# Patient Record
Sex: FEMALE | Race: ASIAN | Employment: FULL TIME | URBAN - METROPOLITAN AREA
[De-identification: names, ages, dates, MRNs, and addresses within clinical notes are randomized per-mention and may not be internally consistent; named-entity substitution may affect disease eponyms.]

---

## 2017-11-06 ENCOUNTER — MED REC SCAN ONLY (OUTPATIENT)
Dept: FAMILY MEDICINE CLINIC | Facility: CLINIC | Age: 47
End: 2017-11-06

## 2018-04-23 ENCOUNTER — HOSPITAL ENCOUNTER (OUTPATIENT)
Age: 48
Discharge: HOME | End: 2018-04-23
Attending: FAMILY MEDICINE
Payer: COMMERCIAL

## 2018-04-23 VITALS
BODY MASS INDEX: 23 KG/M2 | SYSTOLIC BLOOD PRESSURE: 149 MMHG | RESPIRATION RATE: 16 BRPM | OXYGEN SATURATION: 99 % | DIASTOLIC BLOOD PRESSURE: 95 MMHG | HEART RATE: 61 BPM | WEIGHT: 125 LBS | HEIGHT: 62 IN | TEMPERATURE: 99 F

## 2018-04-23 DIAGNOSIS — J02.0 STREPTOCOCCAL SORE THROAT: Primary | ICD-10-CM

## 2018-04-23 PROCEDURE — 99213 OFFICE O/P EST LOW 20 MIN: CPT

## 2018-04-23 PROCEDURE — 99204 OFFICE O/P NEW MOD 45 MIN: CPT

## 2018-04-23 PROCEDURE — 87430 STREP A AG IA: CPT | Performed by: FAMILY MEDICINE

## 2018-04-23 RX ORDER — ENALAPRIL MALEATE 5 MG/1
10 TABLET ORAL DAILY
COMMUNITY
End: 2020-02-02 | Stop reason: ALTCHOICE

## 2018-04-23 RX ORDER — CEPHALEXIN 500 MG/1
500 CAPSULE ORAL 3 TIMES DAILY
Qty: 30 CAPSULE | Refills: 0 | Status: SHIPPED | OUTPATIENT
Start: 2018-04-23 | End: 2018-05-03

## 2018-04-24 NOTE — ED PROVIDER NOTES
Patient Seen in: [facility]    History   Patient presents with:  Sore Throat    Stated Complaint: SORE THROAT X 2-3 DAYS    HPI  50-year-old female presents to the immediate care today with chief complaints of a sore age and cooperative  Head: Normocephalic, without obvious abnormality, atraumatic  Eyes: conjunctivae/corneas clear. PERRL, EOM's intact. Fundi benign. Ears: normal TM's and external ear canals both ears  Nose: Nares normal. Septum midline.  Mucosa normal.

## 2018-05-07 ENCOUNTER — TELEPHONE (OUTPATIENT)
Dept: INTERNAL MEDICINE CLINIC | Facility: CLINIC | Age: 48
End: 2018-05-07

## 2018-05-08 NOTE — TELEPHONE ENCOUNTER
Removed you off care team, this should drop pt off your list.  Please let me know if this doesn't after 24 hrs.

## 2018-12-05 ENCOUNTER — MED REC SCAN ONLY (OUTPATIENT)
Dept: FAMILY MEDICINE CLINIC | Facility: CLINIC | Age: 48
End: 2018-12-05

## 2019-05-14 ENCOUNTER — HOSPITAL ENCOUNTER (OUTPATIENT)
Age: 49
Discharge: HOME | End: 2019-05-14
Attending: FAMILY MEDICINE
Payer: COMMERCIAL

## 2019-05-14 VITALS
WEIGHT: 125 LBS | BODY MASS INDEX: 23 KG/M2 | OXYGEN SATURATION: 100 % | HEART RATE: 68 BPM | TEMPERATURE: 98 F | SYSTOLIC BLOOD PRESSURE: 143 MMHG | RESPIRATION RATE: 16 BRPM | DIASTOLIC BLOOD PRESSURE: 85 MMHG

## 2019-05-14 DIAGNOSIS — J02.9 ACUTE VIRAL PHARYNGITIS: Primary | ICD-10-CM

## 2019-05-14 PROCEDURE — 99214 OFFICE O/P EST MOD 30 MIN: CPT

## 2019-05-14 PROCEDURE — 87081 CULTURE SCREEN ONLY: CPT | Performed by: FAMILY MEDICINE

## 2019-05-14 PROCEDURE — 87430 STREP A AG IA: CPT | Performed by: FAMILY MEDICINE

## 2019-05-14 RX ORDER — PREDNISONE 20 MG/1
40 TABLET ORAL DAILY
Qty: 10 TABLET | Refills: 0 | Status: SHIPPED | OUTPATIENT
Start: 2019-05-14 | End: 2019-05-19

## 2019-05-14 NOTE — ED PROVIDER NOTES
Patient Seen in: [facility]    History   Patient presents with:  Sore Throat    Stated Complaint: Sore throat x 2 days     HPI    67-year-old female history of ADHD, allergic rhinitis, and osteoporosis presents to the IC se Mouth: MMM. Posterior pharynx is clear. No erythema. No exudate. Uvula is midline. Neck: Supple, NT, FROM. No meningeal signs. LAD: No lymphadenopathy. Heart: S1,S2 RRR, No murmur  Lungs: CTA bilateral. No wheezes rales or rhonchi.   Skin: Warm and dry

## 2020-02-02 ENCOUNTER — HOSPITAL ENCOUNTER (OUTPATIENT)
Age: 50
Discharge: HOME | End: 2020-02-02
Attending: EMERGENCY MEDICINE
Payer: COMMERCIAL

## 2020-02-02 VITALS
OXYGEN SATURATION: 99 % | DIASTOLIC BLOOD PRESSURE: 89 MMHG | HEART RATE: 69 BPM | TEMPERATURE: 98 F | SYSTOLIC BLOOD PRESSURE: 146 MMHG | RESPIRATION RATE: 18 BRPM

## 2020-02-02 DIAGNOSIS — T78.40XA ALLERGIC REACTION, INITIAL ENCOUNTER: Primary | ICD-10-CM

## 2020-02-02 PROCEDURE — 99214 OFFICE O/P EST MOD 30 MIN: CPT

## 2020-02-02 PROCEDURE — 96374 THER/PROPH/DIAG INJ IV PUSH: CPT

## 2020-02-02 RX ORDER — PREDNISONE 20 MG/1
20 TABLET ORAL DAILY
Qty: 4 TABLET | Refills: 0 | Status: SHIPPED | OUTPATIENT
Start: 2020-02-03 | End: 2020-02-07

## 2020-02-02 RX ORDER — PREDNISONE 20 MG/1
40 TABLET ORAL ONCE
Status: COMPLETED | OUTPATIENT
Start: 2020-02-02 | End: 2020-02-02

## 2020-02-02 RX ORDER — DIPHENHYDRAMINE HYDROCHLORIDE 50 MG/ML
25 INJECTION INTRAMUSCULAR; INTRAVENOUS ONCE
Status: COMPLETED | OUTPATIENT
Start: 2020-02-02 | End: 2020-02-02

## 2020-02-02 RX ORDER — EPINEPHRINE 0.3 MG/.3ML
0.3 INJECTION SUBCUTANEOUS
Qty: 1 EACH | Refills: 0 | Status: SHIPPED | OUTPATIENT
Start: 2020-02-02 | End: 2020-03-03

## 2020-02-02 NOTE — ED NOTES
Pt also states that the sweating she was feeling when she first came in is now getting better. Pt states that her throat feels the same. Pt's  is at the bedside at this time.

## 2020-02-02 NOTE — ED PROVIDER NOTES
Patient Seen in: [facility]      History   Patient presents with:   Allergic Rxn Allergies    Stated Complaint: allergic reaction, swelling left eye and sore throat today    HPI    Patient states that she took ibuprofen for O2 Device None (Room air)       Current:/89   Pulse 69   Temp 98.3 °F (36.8 °C) (Temporal)   Resp 18   SpO2 99%         Physical Exam  Vitals signs and nursing note reviewed. Constitutional:       General: She is not in acute distress.      Appearan Medications Prescribed:  Current Discharge Medication List    START taking these medications    EPINEPHrine 0.3 MG/0.3ML Injection Solution Auto-injector  Inject 0.3 mL (1 each total) into the muscle daily as needed.   Qty: 1 each Refills: 0    predniSONE 2

## 2020-02-02 NOTE — ED INITIAL ASSESSMENT (HPI)
Pt states that she took Ibuprofen for a headache today at 1230pm and is now having swelling to her left eye and throat swelling/discomfort.  Pt states that she started feeling some weird symptoms around 2pm. Pt just had a reaction from Aleve last week and s

## 2020-11-02 ENCOUNTER — OFFICE VISIT (OUTPATIENT)
Dept: ORTHOPEDICS CLINIC | Facility: CLINIC | Age: 50
End: 2020-11-02
Payer: COMMERCIAL

## 2020-11-02 DIAGNOSIS — M25.511 RIGHT SHOULDER PAIN, UNSPECIFIED CHRONICITY: Primary | ICD-10-CM

## 2020-11-02 DIAGNOSIS — M75.01 ADHESIVE CAPSULITIS OF RIGHT SHOULDER: ICD-10-CM

## 2020-11-02 PROCEDURE — 99213 OFFICE O/P EST LOW 20 MIN: CPT | Performed by: ORTHOPAEDIC SURGERY

## 2020-11-02 PROCEDURE — 99072 ADDL SUPL MATRL&STAF TM PHE: CPT | Performed by: ORTHOPAEDIC SURGERY

## 2020-11-02 RX ORDER — CETIRIZINE HYDROCHLORIDE 10 MG/1
10 TABLET ORAL NIGHTLY
COMMUNITY
End: 2021-03-17 | Stop reason: ALTCHOICE

## 2020-11-02 RX ORDER — TRAZODONE HYDROCHLORIDE 50 MG/1
TABLET ORAL
COMMUNITY
Start: 2019-12-06 | End: 2021-03-17 | Stop reason: ALTCHOICE

## 2020-11-02 RX ORDER — ENALAPRIL MALEATE 10 MG/1
10 TABLET ORAL DAILY
COMMUNITY

## 2020-11-02 NOTE — PROGRESS NOTES
[facility] Follow-up Progress Note      Chief Complaint: Right shoulder pain and stiffness      History: The patient is a 41-year-old [facility] female who returns due to similar but less severe symptoms about her right shoulder.   She was diagnosed needed. Austin Hilario MD  [address] [place] [address] Surgery    The dictation was partially prepared using [employer] voice recognition software.   Although every attempt is made to correct errors where identified, discrepancies may still exist.

## 2021-01-11 ENCOUNTER — OFFICE VISIT (OUTPATIENT)
Dept: ORTHOPEDICS CLINIC | Facility: CLINIC | Age: 51
End: 2021-01-11
Payer: COMMERCIAL

## 2021-01-11 DIAGNOSIS — M75.01 ADHESIVE CAPSULITIS OF RIGHT SHOULDER: Primary | ICD-10-CM

## 2021-01-11 PROCEDURE — 99213 OFFICE O/P EST LOW 20 MIN: CPT | Performed by: ORTHOPAEDIC SURGERY

## 2021-01-11 PROCEDURE — 20610 DRAIN/INJ JOINT/BURSA W/O US: CPT | Performed by: ORTHOPAEDIC SURGERY

## 2021-01-11 RX ORDER — TRIAMCINOLONE ACETONIDE 40 MG/ML
40 INJECTION, SUSPENSION INTRA-ARTICULAR; INTRAMUSCULAR ONCE
Status: COMPLETED | OUTPATIENT
Start: 2021-01-11 | End: 2021-01-11

## 2021-01-11 RX ADMIN — TRIAMCINOLONE ACETONIDE 40 MG: 40 INJECTION, SUSPENSION INTRA-ARTICULAR; INTRAMUSCULAR at 16:29:00

## 2021-01-11 NOTE — PROGRESS NOTES
[facility] Follow-up Progress Note      Chief Complaint: Right shoulder pain and stiffness      History: Patient is a 21-year-old female who presents for reassessment of her right shoulder.   Although she has been working on mobilization and str Although every attempt is made to correct errors where identified, discrepancies may still exist.

## 2021-08-02 ENCOUNTER — OFFICE VISIT (OUTPATIENT)
Dept: ORTHOPEDICS CLINIC | Facility: CLINIC | Age: 51
End: 2021-08-02
Payer: COMMERCIAL

## 2021-08-02 VITALS — WEIGHT: 130 LBS | BODY MASS INDEX: 23.92 KG/M2 | HEIGHT: 62 IN

## 2021-08-02 DIAGNOSIS — M75.01 ADHESIVE CAPSULITIS OF RIGHT SHOULDER: Primary | ICD-10-CM

## 2021-08-02 PROCEDURE — 99213 OFFICE O/P EST LOW 20 MIN: CPT | Performed by: ORTHOPAEDIC SURGERY

## 2021-08-02 PROCEDURE — 3008F BODY MASS INDEX DOCD: CPT | Performed by: ORTHOPAEDIC SURGERY

## 2021-08-02 NOTE — PROGRESS NOTES
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   She has been seen and evalu continues to struggle over time. All questions were answered and she verbalized understanding and appreciation. Cherie Pepper MD  [address] [place] [address] Surgery    The dictation was partially prepared using 4500 [employer] voice recognition software.   Alth

## 2021-11-01 ENCOUNTER — MED REC SCAN ONLY (OUTPATIENT)
Dept: ORTHOPEDICS CLINIC | Facility: CLINIC | Age: 51
End: 2021-11-01

## 2022-02-09 PROBLEM — I10 PRIMARY HYPERTENSION: Status: ACTIVE | Noted: 2022-02-09

## 2022-02-09 PROBLEM — G47.11 IDIOPATHIC HYPERSOMNIA: Status: ACTIVE | Noted: 2022-02-09

## 2022-03-01 ENCOUNTER — MED REC SCAN ONLY (OUTPATIENT)
Dept: ORTHOPEDICS CLINIC | Facility: CLINIC | Age: 52
End: 2022-03-01

## 2022-05-16 ENCOUNTER — TELEPHONE (OUTPATIENT)
Dept: ORTHOPEDICS CLINIC | Facility: CLINIC | Age: 52
End: 2022-05-16

## 2022-05-16 NOTE — TELEPHONE ENCOUNTER
Patient is scheduled with Dr. Odalys Bernardo for right elbow pain. Please advise if imaging is needed.

## 2022-06-01 ENCOUNTER — MED REC SCAN ONLY (OUTPATIENT)
Dept: ORTHOPEDICS CLINIC | Facility: CLINIC | Age: 52
End: 2022-06-01

## 2022-06-21 ENCOUNTER — HOSPITAL ENCOUNTER (OUTPATIENT)
Dept: GENERAL RADIOLOGY | Age: 52
Discharge: HOME | End: 2022-06-21
Attending: ORTHOPAEDIC SURGERY
Payer: COMMERCIAL

## 2022-06-21 ENCOUNTER — OFFICE VISIT (OUTPATIENT)
Dept: ORTHOPEDICS CLINIC | Facility: CLINIC | Age: 52
End: 2022-06-21
Payer: COMMERCIAL

## 2022-06-21 VITALS — BODY MASS INDEX: 23.92 KG/M2 | HEIGHT: 62 IN | WEIGHT: 130 LBS

## 2022-06-21 DIAGNOSIS — M25.521 RIGHT ELBOW PAIN: ICD-10-CM

## 2022-06-21 DIAGNOSIS — M25.521 RIGHT ELBOW PAIN: Primary | ICD-10-CM

## 2022-06-21 PROCEDURE — 3008F BODY MASS INDEX DOCD: CPT | Performed by: ORTHOPAEDIC SURGERY

## 2022-06-21 PROCEDURE — 99213 OFFICE O/P EST LOW 20 MIN: CPT | Performed by: ORTHOPAEDIC SURGERY

## 2022-06-21 PROCEDURE — 73080 X-RAY EXAM OF ELBOW: CPT | Performed by: ORTHOPAEDIC SURGERY

## 2022-10-14 ENCOUNTER — MED REC SCAN ONLY (OUTPATIENT)
Dept: ORTHOPEDICS CLINIC | Facility: CLINIC | Age: 52
End: 2022-10-14

## 2024-08-27 ENCOUNTER — HOSPITAL ENCOUNTER (INPATIENT)
Facility: HOSPITAL | Age: 54
LOS: 1 days | Discharge: HOME | End: 2024-08-28
Attending: EMERGENCY MEDICINE | Admitting: HOSPITALIST

## 2024-08-27 ENCOUNTER — APPOINTMENT (OUTPATIENT)
Dept: MRI IMAGING | Facility: HOSPITAL | Age: 54
End: 2024-08-27
Attending: EMERGENCY MEDICINE

## 2024-08-27 ENCOUNTER — APPOINTMENT (OUTPATIENT)
Dept: CT IMAGING | Facility: HOSPITAL | Age: 54
End: 2024-08-27
Attending: EMERGENCY MEDICINE

## 2024-08-27 NOTE — ED INITIAL ASSESSMENT (HPI)
Pt arrives to ED to have \"speech impairment\" evaluated, which started three days ago. Pt states she has been titrating the Xywav down for the past 2-3 weeks and is now at 3.5. Pt states the higher dose Xywav did make her feeling numbness and tingling, but is not currently experiencing that. Pt just stated she stopped the Xywav medication two nights ago. Pt denies HA, visual changes, numbness, tingling. Pt takes Ketamine once a month, last dose three weeks ago.

## 2024-08-28 ENCOUNTER — TELEPHONE (OUTPATIENT)
Dept: SURGERY | Facility: CLINIC | Age: 54
End: 2024-08-28

## 2024-08-28 ENCOUNTER — APPOINTMENT (OUTPATIENT)
Dept: CT IMAGING | Facility: HOSPITAL | Age: 54
End: 2024-08-28
Attending: INTERNAL MEDICINE

## 2024-08-28 ENCOUNTER — APPOINTMENT (OUTPATIENT)
Dept: CV DIAGNOSTICS | Facility: HOSPITAL | Age: 54
End: 2024-08-28
Attending: HOSPITALIST

## 2024-08-28 DIAGNOSIS — I67.5 MOYAMOYA: Primary | ICD-10-CM

## 2024-08-28 PROBLEM — R47.9 SPEECH DISTURBANCE, UNSPECIFIED TYPE: Status: ACTIVE | Noted: 2024-08-28

## 2024-08-28 PROBLEM — I63.9 ACUTE CVA (CEREBROVASCULAR ACCIDENT) (HCC): Status: ACTIVE | Noted: 2024-08-28

## 2024-08-28 PROBLEM — R73.9 HYPERGLYCEMIA: Status: ACTIVE | Noted: 2024-08-28

## 2024-08-28 NOTE — DISCHARGE PLANNING
Patient follow-up appointment information:     Visit Type: Ischemic  Date of TIA/Stroke: 8/27  Type of Stroke: Ischemic  Patient to follow-up: 4 weeks  OP Neurologist: N/A  New patient to neurology service: yes  Anticipated discharge (if known): today  Discharge disposition (if known): home      RN Stroke Navigator team  [phone]

## 2024-08-28 NOTE — SLP NOTE
ADULT SLP EVALUATION    ASSESSMENT    ASSESSMENT/OVERALL IMPRESSION:  Patient is a 53 y/o female admitted with dysarthria and PMHx significant for hypersomnia. SLP order received to evaluate and treat per stroke protocol. Patient received alert and oriented in bed. She denied history of dysphagia symptoms and reported consuming a regular diet and thin liquids at baseline. Patient reported onset of dysarthria ~1 week ago with consistent symptoms since that time. She denied any numbness or tingling to the face, lips, or tongue. No significant facial droop noted at rest, but reduced/delayed right labial retraction and minimal right lingual deviation noted on oral-mechanism exam.    Patient presented with an intact oropharyngeal swallow. Bolus acceptance was adequate without evidence of anterior bolus loss. Mastication and AP bolus transit were thorough and efficient without evidence of oral residue. Pharyngeal swallow initiation appeared timely and hyolaryngeal excursion was adequate per palpation.  No overt s/s of aspiration observed and patient denied odynophagia and globus sensation across consistencies. Recommend patient initiate a regular diet and thin liquids.    Patient also presented with largely intact expressive and receptive language. However, mild-moderate dysarthria observed. Naming and repetition were intact. Patient able to follow multi-step commands and answer yes/no questions accurately. Patient independently compensating for motor speech deficits by reducing rate of speech and exaggerating articulatory contacts which significant improved intelligibility. SLP will continue to follow for motor speech therapy during hospitalization. She would likely benefit from outpatient SLP services at time of discharge. Education provided re: results and recommendations.          RECOMMENDATIONS   Diet Recommendations - Solids: Regular  Diet Recommendations - Liquids: Thin Liquids                               Medication Administration Recommendations: No restrictions  Treatment Plan/Recommendations: Dysarthria therapy    HISTORY   MEDICAL HISTORY  Reason for Referral: Stroke protocol    Problem List  Principal Problem:    Speech disturbance, unspecified type  Active Problems:    Primary hypertension    Hyperglycemia    Acute CVA (cerebrovascular accident) (HCC)      Past Medical History  Past Medical History:    ADHD (attention deficit hyperactivity disorder)    Allergic rhinitis    Idiopathic hypersomnia    Osteoporosis    Seasonal affective disorder (HCC)    Vitamin D deficiency          Diet Prior to Admission: Regular;Thin liquids       Patient/Family Goals: none stated    SWALLOWING HISTORY  Current Diet Consistency: NPO  Dysphagia History: as above  Imaging Results:   MRI Brain 8/27/24    CONCLUSION:  Abnormal brain MRI.  There is a 15 mm focus of restricted diffusion within the posterior left frontal lobe.  There are smaller scattered punctate foci of restriction abnormality within the left central semiovale.  These imaging findings are   suspicious for a 15 mm acute infarct within the posterior left frontal lobe with scattered punctate subacute infarcts within the parasagittal left frontal lobe.  These findings are within the watershed territory of the left middle cerebral artery.  A CTA    of the head and neck is recommended for further assessment.  A demyelinating process cannot be entirely excluded and continued clinical and imaging follow-up is recommended for further assessment.         Critical results were discussed with Dr. Rosas at midnight on 8/28/2024. Critical results were read back.         LOCATION:  [facility]            Dictated by (CST): Stromberg, LeRoy, MD on 8/27/2024 at 11:55 PM       Finalized by (CST): Stromberg, LeRoy, MD on 8/28/2024 at 0:06 AM     SUBJECTIVE       OBJECTIVE   ORAL MOTOR EXAMINATION  Dentition: Functional  Symmetry: Within Functional Limits  Strength: Reduced right  facial;Reduced right lingual     Range of Motion: Reduced right facial       Voice Quality: Clear  Respiratory Status: Unlabored  Consistencies Trialed: Thin liquids;Puree;Hard solid  Method of Presentation: Self presentation  Patient Positioning: Upright;Midline    Oral Phase of Swallow: Within Functional Limits                      Pharyngeal Phase of Swallow: Within Functional Limits           (Please note: Silent aspiration cannot be evaluated clinically. Videofluoroscopic Swallow Study is required to rule-out silent aspiration.)    Esophageal Phase of Swallow: No complaints consistent with possible esophageal involvement  Comments: d/w RN              GOALS  Goal #1 Patient will implement intelligibility strategies with 90% accuracy when provided minimal cues within 2 visits.  In Progress   Goal #2 The patient will tolerate regular consistency and thin liquids without overt signs or symptoms of aspiration with 90 % accuracy over 1-2 session(s).    In Progress   Goal #3     Goal #4     Goal #5     Goal #6     Goal #7     Goal #8       FOLLOW UP  Treatment Plan/Recommendations: Dysarthria therapy     Follow Up Needed (Documentation Required): Yes  SLP Follow-up Date: 08/29/24    Thank you for your referral.   If you have any questions, please contact MERT Santos

## 2024-08-28 NOTE — PLAN OF CARE
Assumed care at 0730  A/Ox4. KHURRAM ANDUJAR on tele   Neuro checks q4  Denies pain at this time   R AC PIV infusing 0.9 at 75ml/hr  Up ad shanti   SLP cleared for regular diet- thin liquids   Accuchecks QID per stroke protocol  Echo results pending   Plan for CTA brain/carotids today  Safety precautions in place. All needs met at this time

## 2024-08-28 NOTE — PLAN OF CARE
NURSING DISCHARGE NOTE    Discharged Home via Wheelchair.  Accompanied by Family member  Belongings Taken by patient/family.    AVS paperwork reviewed with pt   PIV removed

## 2024-08-28 NOTE — TELEPHONE ENCOUNTER
You are scheduled for Diagnostic Cerebral Angiogram on 9.3.24 with Dr. Guzman    The procedure is performed at the [facility] located at the [facility].    The hospital will call you 1-2 days prior to the scheduled procedure to review your health history and medications, and will tell you what time to arrive.     This procedure is outpatient.  It is done under conscious sedation and use of local anesthetic at the puncture site. You must have someone available to drive you home since you will be receiving sedation for procedure.     Avoid scheduling any appointments on date of procedure, you can expect to be at the hospital about 6-7 hours.     You will need to have labs completed within 2 weeks of the procedure. Please call central scheduling to make appointments to have these done at [phone]      Do not take Vitamin E,C, K, fish oil, krill oil, tumeric or any OTC herbal supplements for 10-14 days prior to procedure.      You should start taking Aspirin, Plavix today and continue taking up to and after the day of procedure, if advised to.     If you have a contrast dye allergy, allergy prep medications will be sent to your pharmacy (benadryl and prednisone)    Do not eat or drink after midnight night before procedure. Ok to take medications with small sips of water.    Our office will get prior authorization from your insurance carrier.    Your follow up appointment is on 9.5.24 at 8:15 am with Dr Guzman    What to expect:    The right groin or radial artery will be used as the puncture site.  You may experience tenderness, slight swelling and bruising at the puncture site    You may have a mild headache after the procedure. Keep well hydrated and rest.     Post angiogram    Avoid strenuous exercise and excessive bending at hip for the first 24 hours. Keep well hydrated. If groin insertion site begins to bleed, apply continous pressure to area for 10 minutes. If it  continues to bleed, call 911 or get to ER immediately.    You may resume normal activities the following day.    You will need to follow up in the office after procedure to discuss the results of the angiogram    Diabetics    Hold Metformin 24 hrs prior to procedure, resume 48 hrs post procedure    Long lasting insulin should be held morning of procedure, if taken at night then 1/2 the normal dose should be taken    Check BS morning of procedure, cover with 1/2 dose of sliding scale instructions    If you have any questions or concerns please contact our office at [phone] #[phone] or via [employer] message.      For Office Use Only:    Medical Clearances Needed: NA  PA: SHELDON KELLY PPO  CPT Codes:  38419, 37462, 62145, 59179, 25930

## 2024-08-28 NOTE — PHYSICAL THERAPY NOTE
PHYSICAL THERAPY EVALUATION - INPATIENT     Room Number: 3620/3620-A  Evaluation Date: 8/28/2024  Type of Evaluation: Initial  Physician Order: PT Eval and Treat    Presenting Problem: Speech disturbance  Co-Morbidities : ADHD  Reason for Therapy: Mobility Dysfunction and Discharge Planning    PHYSICAL THERAPY ASSESSMENT   Patient is a 54 year old female admitted 8/27/2024 for speech disturbance.   Patient is currently functioning at baseline with bed mobility, transfers, gait, stair negotiation, and standing prolonged periods. Prior to admission, patient's baseline is ind in all aspect of mobilities and works as a PT in the school system.     Patient will does not need further skilled PT at this time and d/c from skilled PT intervention.    MRI  Impression  CONCLUSION:  Abnormal brain MRI.  There is a 15 mm focus of restricted diffusion within the posterior left frontal lobe.  There are smaller scattered punctate foci of restriction abnormality within the left central semiovale.  These imaging findings are   suspicious for a 15 mm acute infarct within the posterior left frontal lobe with scattered punctate subacute infarcts within the parasagittal left frontal lobe.  These findings are within the watershed territory of the left middle cerebral artery.  A CTA    of the head and neck is recommended for further assessment.  A demyelinating process cannot be entirely excluded and continued clinical and imaging follow-up is recommended for further assessment.      PLAN  Patient has been evaluated and presents with no skilled Physical Therapy needs at this time.  Patient discharged from Physical Therapy services.  Please re-order if a new functional limitation presents during this admission.    GOALS  Patient was able to achieve the following goals ...    Patient was able to transfer At previous, functional level  Safely and independently   Patient able to ambulate on level surfaces At previous, functional  level  Safely and independently         HOME SITUATION  Type of Home: House   Home Layout: Two level  Stairs to Enter : 1  Railing: Yes  Stairs to Bedroom: 14       Lives With: Family  Drives: Yes  Patient Owned Equipment: None       Prior Level of [place]:  Prior to admission, patient's baseline is ind in all aspect of mobilities and works as a PT in the school system.     SUBJECTIVE  No problem with mobilities except my speech      OBJECTIVE     Fall Risk: Standard fall risk    WEIGHT BEARING RESTRICTION  Weight Bearing Restriction: None                PAIN ASSESSMENT  Ratin          COGNITION  Overall Cognitive Status:  WFL - within functional limits    RANGE OF MOTION AND STRENGTH ASSESSMENT  Upper extremity ROM and strength are within functional limits     Lower extremity ROM is within functional limits     Lower extremity strength is within functional limits       BALANCE  Static Sitting: Good  Dynamic Sitting: Good  Static Standing: Good  Dynamic Standing: Good    ADDITIONAL TESTS   Modified [place]:        ACTIVITY TOLERANCE: Good        NEUROLOGICAL FINDINGS; WNL         AM-PAC '6-Clicks' INPATIENT SHORT FORM - BASIC MOBILITY  How much difficulty does the patient currently have...  Patient Difficulty: Turning over in bed (including adjusting bedclothes, sheets and blankets)?: None   Patient Difficulty: Sitting down on and standing up from a chair with arms (e.g., wheelchair, bedside commode, etc.): None   Patient Difficulty: Moving from lying on back to sitting on the side of the bed?: None   How much help from another person does the patient currently need...   Help from Another: Moving to and from a bed to a chair (including a wheelchair)?: None   Help from Another: Need to walk in hospital room?: None   Help from Another: Climbing 3-5 steps with a railing?: None       AM-PAC Score:  Raw Score: 24   Approx Degree of Impairment: 0%   Standardized Score (AM-PAC Scale): 61.14   CMS Modifier  (G-Code): CH    FUNCTIONAL ABILITY STATUS  Gait Assessment   Functional Mobility/Gait Assessment  Gait Assistance: Independent  Distance (ft): 400  Assistive Device: None    Skilled Therapy Provided     Bed Mobility:  Rolling: ind   Supine to sit: ind   Sit to supine: ind     Transfer Mobility:  Sit to stand: ind   Stand to sit: ind  Gait = is steady s any use of an A.D.    Therapist's comments:  No functional deficits on B LE/UE/face  Noted with + slurring of speech  Instructed to walk several times with staff while in hosp with safety awareness  Left on the recliner and made comfortable  Addressed all issues and concerns. Nursing is aware of this visit.      Exercise/Education Provided:  Bed mobility  Functional activity tolerated  Gait training    Patient End of Session: Up in chair;Needs met;Call light within reach;RN aware of session/findings;All patient questions and concerns addressed    Patient Evaluation Complexity Level:  History Low - no personal factors and/or co-morbidities   Examination of body systems Low - addressing 1-2 elements   Clinical Presentation Low - Stable   Clinical Decision Making Low Complexity       PT Session Time: 20 minutes  Gait Training: 10 minutes  Therapeutic Activity: 5 minutes

## 2024-08-28 NOTE — CM/SW NOTE
08/28/24 1700   CM/SW Referral Data   Referral Source Nurse   Reason for Referral Protocol order set   Specify order set Stroke   Informant EMR     HOME SITUATION  Type of Home: House   Home Layout: Two level  Stairs to Enter : 1  Railing: Yes  Stairs to Bedroom: 14     Lives With: Family  Drives: Yes  Patient Owned Equipment: None     Prior Level of [place] per PT eval:  Prior to admission, patient's baseline is ind in all aspect of mobilities and works as a PT in the school system.     SW received protocol order for  eval. Pt is a 53 y/o female admitted with speech disturbance. Chart reviewed and noted PT does not anticipate skilled therapy needs at discharge.     Anticipated therapy need: Home    No discharge needs identified at this time. SW will continue to remain available for any additional discharge needs.     ALINA Yin  Discharge Planner

## 2024-08-28 NOTE — PROGRESS NOTES
[facility]   part of [facility]     Hospitalist Progress Note     Zeenat Kevin Patient Status:  Inpatient    1970 MRN [id number]   Location [facility] 3NE-A Attending Daniel Brady DO   Hosp Day # 0 PCP REGLA GALLO DO     Chief Complaint: Speech difficulty    Subjective:     Patient reports word finding difficulty. She also has slurred speech when she tries to talk fast. No focal weakness, numbness or tingling. Her symptoms started last Wednesday, improved the next day but worsened again Saturday and never improved.     Objective:    Review of Systems:   A comprehensive review of systems was completed; pertinent positive and negatives stated in subjective.    Vital signs:  Temp:  [97.1 °F (36.2 °C)-98 °F (36.7 °C)] 98 °F (36.7 °C)  Pulse:  [59-78] 73  Resp:  [14-18] 14  BP: (104-147)/(55-86) 104/55  SpO2:  [95 %-100 %] 98 %    Physical Exam:    General: No acute distress, awake and alert  Respiratory: No wheezes, no rhonchi  Cardiovascular: S1, S2, regular rate and rhythm  Abdomen: Soft, Non-tender, non-distended, positive bowel sounds  Neuro: No focal weakness. SILT. Word finding difficulty noted  Extremities: No edema    Diagnostic Data:    Labs:  Recent Labs   Lab 24  1852   WBC 6.9   HGB 12.1   MCV 87.7   .0     Recent Labs   Lab 24  2018   *   BUN 13   CREATSERUM 0.77   CA 10.0   ALB 4.7      K 3.8      CO2 28.0   ALKPHO 107   AST 19   ALT 15   BILT 0.5   TP 7.0     Estimated Creatinine Clearance: 66.8 mL/min (based on SCr of 0.77 mg/dL).    No results for input(s): \"TROP\", \"TROPHS\", \"CK\" in the last 168 hours.    No results for input(s): \"PTP\", \"INR\" in the last 168 hours.     Microbiology  No results found for this visit on 24.    Imaging: Reviewed in Epic.    Medications:    enoxaparin  40 mg Subcutaneous Daily    atorvastatin  40 mg Oral Nightly    clopidogrel  75 mg Oral Daily       Assessment & Plan:      #Acute 15 mm left frontal  lobe CVA with other scattered punctate infarcts   -Neuro consulted  -MRI reviewed  -Check CTA head and neck  -PT/OT/SLP  -Echo with bubble study  -DAPT, statin  -A1c pending, lipid panel reviewed     #Hypertension  -Out of window for permissive HTN  -Resume home BB with holding parameters  -Hold amlodipine-benazepril    #Hypersomnia  #ADHD  -On Xywav outpatient, hold for now  -Hold Adderall     #Depression  -Trintellix continued  -Also on monthly ketamine      Daniel Brady, DO    Supplementary Documentation:     Quality:  DVT Mechanical Prophylaxis:   SCDs,    DVT Pharmacologic Prophylaxis   Medication    enoxaparin (Lovenox) 40 MG/0.4ML SUBQ injection 40 mg   Code Status: Full  Brown: No urinary catheter in place  DEON: 1-2 days    Discharge is dependent on: Clinical state, neuro recs  At this point Ms. Kevin is expected to be discharge to: Home    The 21st Century Cures Act makes medical notes like these available to patients in the interest of transparency. Please be advised this is a medical document. Medical documents are intended to carry relevant information, facts as evident, and the clinical opinion of the practitioner. The medical note is intended as peer to peer communication and may appear blunt or direct. It is written in medical language and may contain abbreviations or verbiage that are unfamiliar.

## 2024-08-28 NOTE — ED QUICK NOTES
Rounding Completed    Plan of Care reviewed. Waiting for labs and imaging.  Elimination needs assessed.  Provided comfort measures.    Bed is locked and in lowest position. Call light within reach.

## 2024-08-28 NOTE — H&P
[facility]     Zeenat Kevin Patient Status:  Emergency    1970 MRN [id number]   Location [facility] EMERGENCY DEPARTMENT Attending David Rosas MD   Hosp Day # 0 PCP REGLA GALLO DO     Chief Complaint: Speech difficulty    Subjective:    History of Present Illness:     Zeenat Kevin is a 54 year old female with history of hypersomnia, ADHD seen a sleep doctor that has had patient on xYWAV( ghb).  Patient reports being on this medication for the past 2 years and was starting to have some numbness and tingling and started tapering off the medication about 3 weeks ago.  Patient had decreased the dose from 4 to 3-1/2 mg and on Saturday started having trouble with her speech.  Precisely she is having trouble with rolling her hours and finds that she has to talk slower in order to enunciate her words.  No visual changes, headache, focalized weakness in the arms or legs.  No fevers, chills, nausea, vomiting, diarrhea or constipation.  No chest pain, shortness of breath, palpitations.  Patient denies drinking any alcohol, using marijuana, or using any other illegal drugs, or tobacco products.     History/Other:    Past Medical History:  Past Medical History:    ADHD (attention deficit hyperactivity disorder)    Allergic rhinitis    Idiopathic hypersomnia    Osteoporosis    Seasonal affective disorder (HCC)    Vitamin D deficiency     Past Surgical History:   Past Surgical History:   Procedure Laterality Date    Ablation      uterus      Family History:   Family History   Problem Relation Age of Onset    Hypertension Mother     Hypertension Brother     Other (stomach cancer) Paternal Aunt      Social History:    reports that she has never smoked. She has never used smokeless tobacco. She reports that she does not currently use alcohol after a past usage of about 0.8 standard drinks of alcohol per week. She reports that she does not use drugs.     Allergies:   Allergies    Allergen Reactions    Ciprofloxacin-Hydrocortisone ITCHING and HIVES     Tingling of hands and feet    Ibuprofen SWELLING    Birch Tar Oil [Betula Alba Oil]      [place]    Modafinil OTHER (SEE COMMENTS)     Pt felt like throat was closing       Medications:    No current facility-administered medications on file prior to encounter.     Current Outpatient Medications on File Prior to Encounter   Medication Sig Dispense Refill    amLODIPine Besy-Benazepril HCl 5-20 MG Oral Cap Take 1 capsule by mouth daily.      metoprolol succinate ER 25 MG Oral Tablet 24 Hr Take 1 tablet (25 mg total) by mouth daily.      enalapril 10 MG Oral Tab Take 1 tablet (10 mg total) by mouth daily. (Patient not taking: Reported on 8/27/2024) 90 tablet 1    amphetamine-dextroamphetamine 10 MG Oral Tab Take 10 mg by mouth 2 (two) times daily.         Review of Systems:   A comprehensive review of systems was completed.    Pertinent positives and negatives noted in the HPI.    Objective:   Physical Exam:    /71   Pulse 64   Temp 97.1 °F (36.2 °C)   Resp 18   Ht 5' 2\" (1.575 m)   Wt 132 lb (59.9 kg)   SpO2 99%   BMI 24.14 kg/m²   General: No acute distress, Alert  Respiratory: No rhonchi, no wheezes  Cardiovascular: S1, S2. Regular rate and rhythm  Abdomen: Soft, Non-tender, non-distended, positive bowel sounds  Neuro: No new focal deficits  Extremities: No edema      Results:    Labs:      Labs Last 24 Hours:    Recent Labs   Lab 08/27/24  1852   RBC 3.90   HGB 12.1   HCT 34.2*   MCV 87.7   MCH 31.0   MCHC 35.4   RDW 12.0   NEPRELIM 3.67   WBC 6.9   .0       Recent Labs   Lab 08/27/24  2018   *   BUN 13   CREATSERUM 0.77   EGFRCR 92   CA 10.0   ALB 4.7      K 3.8      CO2 28.0   ALKPHO 107   AST 19   ALT 15   BILT 0.5   TP 7.0       No results found for: \"PT\", \"INR\"    No results for input(s): \"TROP\", \"TROPHS\", \"CK\" in the last 168 hours.    No results for input(s): \"TROP\", \"PBNP\" in the last 168  hours.    No results for input(s): \"PCT\" in the last 168 hours.    Imaging: Imaging data reviewed in Epic.    Assessment & Plan:      # Acute CVA  - Started  4 days ago so not a TNK candidate  - MRI completes  - Will continue stroke protocol with plavix, statin.  - Neurology on consult.  - Speech eval    # Hypertension  - will hold meds to allow permissive hypertension.        Plan of care discussed with patient at bedside.    Ho Salmeron, DO    Supplementary Documentation:     The 21st Century Cures Act makes medical notes like these available to patients in the interest of transparency. Please be advised this is a medical document. Medical documents are intended to carry relevant information, facts as evident, and the clinical opinion of the practitioner. The medical note is intended as peer to peer communication and may appear blunt or direct. It is written in medical language and may contain abbreviations or verbiage that are unfamiliar.

## 2024-08-28 NOTE — ED PROVIDER NOTES
Patient Seen in: [facility] Emergency Department      History     Chief Complaint   Patient presents with    Stroke     Stated Complaint: speach issues starting last wednesday    Subjective:   HPI    Patient 54-year-old female who with history of hypersomnia.  Patient sees a sleep doctor and has been on Xywav for the past 2 years.  Patient states she was having numbness tingling symptoms and started taper off the medication about 3 weeks ago.  Patient went from 4-3.5.  Patient states on Saturday, 3 days ago she started having trouble with her speech.  Patient states she is having trouble rolling her Rs and has to talk slower in order to enunciate properly.  Patient states it has persisted since Saturday.  Patient denies headache, no blurry vision double vision, no weakness in arms arms or legs.  No chest pain, shortness of breath.  No drugs or alcohol.  Patient denies any other new medications or changes in medications.  Remainder of review of systems negative.    Objective:   Past Medical History:    ADHD (attention deficit hyperactivity disorder)    Allergic rhinitis    Idiopathic hypersomnia    Osteoporosis    Seasonal affective disorder (HCC)    Vitamin D deficiency              Past Surgical History:   Procedure Laterality Date    Ablation  2011    uterus                Social History     Socioeconomic History    Marital status:    Tobacco Use    Smoking status: Never    Smokeless tobacco: Never   Vaping Use    Vaping status: Never Used   Substance and Sexual Activity    Alcohol use: Not Currently     Alcohol/week: 0.8 standard drinks of alcohol     Types: 1 drink(s) per week    Drug use: No    Sexual activity: Yes     Partners: Male     Birth control/protection: Surgical     Comment: ablation    Other Topics Concern    Caffeine Concern No    Stress Concern No    Weight Concern No    Special Diet No    Exercise No    Seat Belt Yes              Review of Systems    Positive for stated Chief  Complaint: Stroke    Other systems are as noted in HPI.  Constitutional and vital signs reviewed.      All other systems reviewed and negative except as noted above.    Physical Exam     ED Triage Vitals   BP 08/27/24 1840 138/77   Pulse 08/27/24 1840 78   Resp 08/27/24 1840 16   Temp 08/27/24 1840 97.1 °F (36.2 °C)   Temp src --    SpO2 08/27/24 1840 96 %   O2 Device 08/27/24 2040 None (Room air)       Current Vitals:   Vital Signs  BP: 123/74  Pulse: 61  Resp: 16  Temp: 97.1 °F (36.2 °C)  MAP (mmHg): 89    Oxygen Therapy  SpO2: 98 %  O2 Device: None (Room air)            Physical Exam  GENERAL: Patient resting on the cart in no acute distress.  HEENT: Extraocular muscles intact, pupils equal round reactive to light and accommodation.  Mouth normal, neck supple, no meningismus.  LUNGS: Lungs clear to auscultation bilaterally.  CARDIOVASCULAR: + S1-S2, regular rate and rhythm, no murmurs.  BACK: No CVA tenderness, no midline bony tenderness.  ABDOMEN: + Bowel sounds, soft, nontender, nondistended.  No rebound, no guarding, no hepatosplenomegaly.  EXTREMITIES: Full range of motion, no tenderness, good capillary refill.  SKIN: No rash, good turgor.  NEURO: Patient answers questions appropriately.  No facial droop.  Sensation intact bilateral face.  Tongue midline.  Hearing intact.  Shoulder shrug normal.  Finger-to-nose intact bilaterally.  No pronator drift.  5 out of 5 strength upper and lower extremities.  Sensation tact light touch.  Patient speaks slowly but able to enunciate properly.       ED Course     Labs Reviewed   COMP METABOLIC PANEL (14) - Abnormal; Notable for the following components:       Result Value    Glucose 118 (*)     All other components within normal limits   CBC WITH DIFFERENTIAL WITH PLATELET - Abnormal; Notable for the following components:    HCT 34.2 (*)     All other components within normal limits   URINALYSIS, ROUTINE - Abnormal; Notable for the following components:    Blood Urine 2+  (*)     RBC Urine 3-5 (*)     Bacteria Urine Rare (*)     Squamous Epi. Cells Few (*)     All other components within normal limits   DRUG SCREEN 8 W/OUT CONFIRMATION, URINE - Abnormal; Notable for the following components:    Amphetamine Urine Presumed Positive (*)     All other components within normal limits    Narrative:     Results of the Urine Drug Screen should be used only for medical purposes.   ETHYL ALCOHOL - Normal   RAINBOW DRAW LAVENDER   RAINBOW DRAW LIGHT GREEN     EKG    Rate, intervals and axes as noted on EKG Report.  Rate: 70  Rhythm: Sinus Rhythm  Reading: Normal sinus rhythm, no acute changes           TNK/ NI Documentation:    Date/Time last known well:   N/A    NIHSS on presentation: N/A     Chief Complaint   Patient presents with    Stroke     IV Tenecteplase (TNK) administered: No; Patient is not a Candidate for IV TNK due to: Out of time window period or time of onset unknown    Candidate for Endovascular thrombectomy (EVT): No; Patient is not a candidate for Endovascular Thrombectomy due to: Symptom onset > 24 hours      Disposition: There is no disposition on file for this visit.         CT brain1. There are multiple foci of decreased attenuation within the left frontal lobe.  Differential considerations include areas of ischemia/infarction, inflammation, neoplastic processes and infection.  MRI without and with gadolinium recommended for   further evaluation.  Critical test results were called to Dr. Rosas at 2156 hours on 8/27/2024 with appropriate read back.   2. No acute intracranial hemorrhage.   Independent reviewed by myself, no hemorrhage noted        MRI brain did speak with radiology who felt likely acute stroke.         MDM      Patient was not a candidate for TNK or neurointervention given onset of symptoms 3 days ago.  I did speak with neurology who did recommend plain MRI brain.  If this is negative patient can follow-up as an outpatient.  Patient is comfortable with this  plan.  Patient does have ibuprofen allergy causes swelling and severe allergic reaction.  Patient states she cannot take aspirin.  Patient was given Plavix.  I did speak with the [facility].  Patient admitted for further evaluation.  I did consider mass, CVA, medication side effect.  Admission disposition: 8/28/2024 12:02 AM                                        Medical Decision Making      Disposition and Plan     Clinical Impression:  1. Speech disturbance, unspecified type    2. Acute CVA (cerebrovascular accident) ([facility])         Disposition:  Admit  8/28/2024 12:02 am    Follow-up:  No follow-up provider specified.        Medications Prescribed:  Current Discharge Medication List                            Hospital Problems       Present on Admission  Date Reviewed: 6/21/2022            ICD-10-CM Noted POA    * (Principal) Speech disturbance, unspecified type R47.9 8/28/2024 Unknown    Hyperglycemia R73.9 8/28/2024 Yes

## 2024-08-28 NOTE — PLAN OF CARE
Pt a/ox4  RA, VSS  Sinus on tele  Afebrile  Denies pain  Ambulatory  For neuro bedside consult in the morning  For echo with bubbles  Speech to eval, NPO  Bed at lowest position, bed alarm on  Safety and fall prec  maintained  POC on-going      Problem: SAFETY ADULT - FALL  Goal: Free from fall injury  Description: INTERVENTIONS:  - Assess pt frequently for physical needs  - Identify cognitive and physical deficits and behaviors that affect risk of falls.  - [place] fall precautions as indicated by assessment.  - Educate pt/family on patient safety including physical limitations  - Instruct pt to call for assistance with activity based on assessment  - Modify environment to reduce risk of injury  - Provide assistive devices as appropriate  - Consider OT/PT consult to assist with strengthening/mobility  - Encourage toileting schedule  Outcome: Progressing     Problem: NEUROLOGICAL - ADULT  Goal: Achieves stable or improved neurological status  Description: INTERVENTIONS  - Assess for and report changes in neurological status  - Initiate measures to prevent increased intracranial pressure  - Maintain blood pressure and fluid volume within ordered parameters to optimize cerebral perfusion and minimize risk of hemorrhage  - Monitor temperature, glucose, and sodium. Initiate appropriate interventions as ordered  Outcome: Progressing  Goal: Absence of seizures  Description: INTERVENTIONS  - Monitor for seizure activity  - Administer anti-seizure medications as ordered  - Monitor neurological status  Outcome: Progressing  Goal: Remains free of injury related to seizure activity  Description: INTERVENTIONS:  - Maintain airway, patient safety  and administer oxygen as ordered  - Monitor patient for seizure activity, document and report duration and description of seizure to MD/LIP  - If seizure occurs, turn patient to side and suction secretions as needed  - Reorient patient post seizure  - Seizure pads on all 4 side  rails  - Instruct patient/family to notify RN of any seizure activity  - Instruct patient/family to call for assistance with activity based on assessment  Outcome: Progressing  Goal: Achieves maximal functionality and self care  Description: INTERVENTIONS  - Monitor swallowing and airway patency with patient fatigue and changes in neurological status  - Encourage and assist patient to increase activity and self care with guidance from PT/OT  - Encourage visually impaired, hearing impaired and aphasic patients to use assistive/communication devices  Outcome: Progressing     Problem: CARDIOVASCULAR - ADULT  Goal: Maintains optimal cardiac output and hemodynamic stability  Description: INTERVENTIONS:  - Monitor vital signs, rhythm, and trends  - Monitor for bleeding, hypotension and signs of decreased cardiac output  - Evaluate effectiveness of vasoactive medications to optimize hemodynamic stability  - Monitor arterial and/or venous puncture sites for bleeding and/or hematoma  - Assess quality of pulses, skin color and temperature  - Assess for signs of decreased coronary artery perfusion - ex. Angina  - Evaluate fluid balance, assess for edema, trend weights  Outcome: Progressing  Goal: Absence of cardiac arrhythmias or at baseline  Description: INTERVENTIONS:  - Continuous cardiac monitoring, monitor vital signs, obtain 12 lead EKG if indicated  - Evaluate effectiveness of antiarrhythmic and heart rate control medications as ordered  - Initiate emergency measures for life threatening arrhythmias  - Monitor electrolytes and administer replacement therapy as ordered  Outcome: Progressing

## 2024-08-28 NOTE — CONSULTS
[facility]  Neurological Surgery Consultation Note    Zeenat Kevin Patient Status:  Inpatient    1970 MRN [id number]   Location [facility] 3NE-A Attending Daniel Brady DO   Hosp Day # 0 PCP REGLA GALLO DO     REASON FOR CONSULTATION:  Symptomatic left-sided moyamoya disease    HISTORY OF PRESENT ILLNESS:  Zeenat Kevin is a 54 year old female who presented yesterday with acute slurred speech and language difficulty.  She denied to me any right sided numbness or weakness.  MRI brain demonstrated a left hemisphere focus of infarct.  CTA head and neck demonstrated left ICA terminus steno-occlusive disease consistent with moyamoya.  She has been started on aspirin and Plavix by neurology.  She has a history of labile blood pressures for which she is on medication.    PAST MEDICAL HISTORY:  Past Medical History:    ADHD (attention deficit hyperactivity disorder)    Allergic rhinitis    Idiopathic hypersomnia    Osteoporosis    Seasonal affective disorder (HCC)    Vitamin D deficiency       PAST SURGICAL HISTORY:  Past Surgical History:   Procedure Laterality Date    Ablation      uterus       FAMILY HISTORY:  family history includes Hypertension in her brother and mother; stomach cancer in her paternal aunt.    SOCIAL HISTORY:   reports that she has never smoked. She has never used smokeless tobacco. She reports that she does not currently use alcohol after a past usage of about 0.8 standard drinks of alcohol per week. She reports that she does not use drugs.    ALLERGIES:  Allergies   Allergen Reactions    Ciprofloxacin-Hydrocortisone ITCHING and HIVES     Tingling of hands and feet    Ibuprofen SWELLING    Birch Tar Oil [Betula Alba Oil]      [place]    Modafinil OTHER (SEE COMMENTS)     Pt felt like throat was closing       MEDICATIONS:  Medications Prior to Admission   Medication Sig Dispense Refill Last Dose    progesterone 100 MG Oral Cap  Take 1 capsule (100 mg total) by mouth nightly.   Past Week    vortioxetine 10 MG Oral Tab Take 1 tablet (10 mg total) by mouth daily.   Past Week    amLODIPine Besy-Benazepril HCl 5-20 MG Oral Cap Take 1 capsule by mouth daily.   Past Week    metoprolol succinate ER 25 MG Oral Tablet 24 Hr Take 1 tablet (25 mg total) by mouth daily.   Past Week    amphetamine-dextroamphetamine 10 MG Oral Tab Take 1 tablet (10 mg total) by mouth 3 (three) times daily as needed.   Past Week    enalapril 10 MG Oral Tab Take 1 tablet (10 mg total) by mouth daily. (Patient not taking: Reported on 8/27/2024) 90 tablet 1 Not Taking     Current Facility-Administered Medications   Medication Dose Route Frequency    sodium chloride 0.9% infusion   Intravenous Continuous    labetalol (Trandate) 5 mg/mL injection 10 mg  10 mg Intravenous Q10 Min PRN    hydrALAzine (Apresoline) 20 mg/mL injection 10 mg  10 mg Intravenous Q2H PRN    prochlorperazine (Compazine) 10 MG/2ML injection 5 mg  5 mg Intravenous Q8H PRN    enoxaparin (Lovenox) 40 MG/0.4ML SUBQ injection 40 mg  40 mg Subcutaneous Daily    clopidogrel (Plavix) tab 75 mg  75 mg Oral Daily    aspirin chewable tab 81 mg  81 mg Oral Daily    melatonin tab 3 mg  3 mg Oral Nightly PRN    vortioxetine (Trintellix) tab 10 mg  10 mg Oral Daily    metoprolol succinate ER (Toprol XL) 24 hr tab 25 mg  25 mg Oral Daily    sodium chloride 0.9% infusion   Intravenous Continuous    acetaminophen (Tylenol) tab 650 mg  650 mg Oral Q4H PRN    Or    acetaminophen (Tylenol) rectal suppository 650 mg  650 mg Rectal Q4H PRN    ondansetron (Zofran) 4 MG/2ML injection 4 mg  4 mg Intravenous Q6H PRN    atorvastatin (Lipitor) tab 40 mg  40 mg Oral Nightly       REVIEW OF SYSTEMS:  A 10-point system was reviewed.  Pertinent positives and negatives are noted in HPI.      PHYSICAL EXAMINATION:  VITAL SIGNS: /82 (BP Location: Left arm)   Pulse 66   Temp 98 °F (36.7 °C) (Oral)   Resp 16   Ht 62\"   Wt 133 lb  9.6 oz (60.6 kg)   SpO2 96%   BMI 24.44 kg/m²   GENERAL:  Patient is a 54 year old female in no acute distress.  HEENT:  Normocephalic, atraumatic  LUNGS: Nonlabored breathing  HEART:  Well perfused  NEUROLOGICAL:    A&Ox 3, Speech fluent. Comprehension intact  PERRL, EOMI  Trace right nasolabial fold flattening  Full strength x 4, no drift  Sensation intact    DIAGNOSTIC DATA:   Lab Results   Component Value Date    WBC 6.9 08/27/2024    HGB 12.1 08/27/2024    HCT 34.2 08/27/2024    .0 08/27/2024    CREATSERUM 0.77 08/27/2024    BUN 13 08/27/2024     08/27/2024    K 3.8 08/27/2024     08/27/2024    CO2 28.0 08/27/2024     08/27/2024    CA 10.0 08/27/2024    ALB 4.7 08/27/2024    ALKPHO 107 08/27/2024    BILT 0.5 08/27/2024    TP 7.0 08/27/2024    AST 19 08/27/2024    ALT 15 08/27/2024    ETOH <3 08/27/2024    PGLU 159 08/28/2024       ASSESSMENT:  54-year-old female with symptomatic left moyamoya disease    I had an extensive discussion with the patient and her  at the current clinical situation and plan of care.  We reviewed the imaging together.  We discussed the natural history of moyamoya disease and the necessary workup and treatment.  This included discussion of the risks, benefits, alternatives, goals, and expectations of a diagnostic cerebral angiogram for further cerebrovascular characterization and treatment planning.  We discussed the importance of continuing the medical management as prescribed by neurology and we discussed the importance of hydration.  We discussed the signs and symptoms for which to seek emergent medical attention.  After this discussion and answering their questions they expressed understanding and agreement with the plan and proceeding with a diagnostic cerebral angiogram as an outpatient.    Plan:  - Medical management per neurology  - Will plan for diagnostic cerebral angiogram this coming Tuesday with subsequent outpatient follow-up  - Okay for  discharge from neurosurgical perspective    Cornelio Guzman MD  Neurological Surgery  [facility]

## 2024-08-28 NOTE — PROGRESS NOTES
NURSING ADMISSION NOTE      Patient admitted via Cart  Oriented to room.  Safety precautions initiated.  Bed in low position.  Call light in reach.    Admission navigator completed by this RN. Neuro assessments Q2 initiated at 0200, to be Q4 after 1000.

## 2024-08-28 NOTE — ED QUICK NOTES
Orders for admission, patient is aware of plan and ready to go upstairs. Any questions, please call ED RN [phone] at extension [email or web address].     Patient Covid vaccination status: Fully vaccinated     COVID Test Ordered in ED: None    COVID Suspicion at Admission: N/A    Running Infusions:    sodium chloride 125 mL/hr (08/27/24 2013)        Mental Status/LOC at time of transport: ALERT X 4    Other pertinent information: SPEECH ABNORMALITY ( HARD TIME ENUNCIATING WORDS) STARTED SATURDAY. NO SLURRING OF SPEECH. ALL CONSULTS AWARE  CIWA score: N/A   NIH score:  1

## 2024-08-28 NOTE — CONSULTS
[facility]   NEUROLOGY   CONSULT NOTE    Admission date: 8/27/2024  Reason for Consult: Acute stroke.  Chief Complaint:   Chief Complaint   Patient presents with    Stroke   ________________________________________________________________    History     History of Presenting Illness  54 year old female with hypersomnia, ADHD, on xYWAB.  Patient was noting to have some numbness and tingling sensation and was being weaned off from her medication.  She started having expressive speech difficulties Sunday she also felt that she was having slight weakness on the right side.  There was no headache, nausea, vomiting, photophobia, phonophobia, seizure-like activity.  Patient has no prior history of strokes, thinks that she had 1 miscarriage early on, no history of smoking and no history of oral contraceptive use.  She does take antihypertensive regularly.    History obtained from patient and chart review.    Past Medical History:    ADHD (attention deficit hyperactivity disorder)    Allergic rhinitis    Idiopathic hypersomnia    Osteoporosis    Seasonal affective disorder (HCC)    Vitamin D deficiency     Past Surgical History:   Procedure Laterality Date    Ablation  2011    uterus     Social History     Socioeconomic History    Marital status:    Tobacco Use    Smoking status: Never    Smokeless tobacco: Never   Vaping Use    Vaping status: Never Used   Substance and Sexual Activity    Alcohol use: Not Currently     Alcohol/week: 0.8 standard drinks of alcohol     Types: 1 drink(s) per week    Drug use: No    Sexual activity: Yes     Partners: Male     Birth control/protection: Surgical     Comment: ablation    Other Topics Concern    Caffeine Concern No    Stress Concern No    Weight Concern No    Special Diet No    Exercise No    Seat Belt Yes     Social Determinants of Health     Food Insecurity: No Food Insecurity (8/28/2024)    Food Insecurity     Food Insecurity: Never true    Transportation Needs: No Transportation Needs (8/28/2024)    Transportation Needs     Lack of Transportation: No   Housing Stability: Low Risk  (8/28/2024)    Housing Stability     Housing Instability: No     Family History   Problem Relation Age of Onset    Hypertension Mother     Hypertension Brother     Other (stomach cancer) Paternal Aunt      Allergies   Allergies   Allergen Reactions    Ciprofloxacin-Hydrocortisone ITCHING and HIVES     Tingling of hands and feet    Ibuprofen SWELLING    Birch Tar Oil [Bet[place] Oil]      [place]    Modafinil OTHER (SEE COMMENTS)     Pt felt like throat was closing       Home Meds  Current Outpatient Medications   Medication Instructions    amLODIPine Besy-Benazepril HCl 5-20 MG Oral Cap 1 capsule, Oral, Daily    amphetamine-dextroamphetamine 10 MG Oral Tab 10 mg, Oral, 3 times daily PRN    enalapril (VASOTEC) 10 mg, Oral, Daily    metoprolol succinate ER (TOPROL XL) 25 mg, Oral, Daily    progesterone (PROMETRIUM) 100 mg, Oral, Nightly    vortioxetine (TRINTELLIX) 10 mg, Oral, Daily     Scheduled Meds:   enoxaparin  40 mg Subcutaneous Daily    clopidogrel  75 mg Oral Daily    aspirin  81 mg Oral Daily    vortioxetine  10 mg Oral Daily    metoprolol succinate ER  25 mg Oral Daily    atorvastatin  40 mg Oral Nightly     Continuous Infusions:   sodium chloride 75 mL/hr at 08/28/24 0227    sodium chloride 75 mL/hr at 08/28/24 1030     PRN Meds:  labetalol    hydrALAzine    prochlorperazine    melatonin    acetaminophen **OR** acetaminophen    ondansetron    OBJECTIVE   VITAL SIGNS:   Temp:  [97.1 °F (36.2 °C)-98 °F (36.7 °C)] 97.8 °F (36.6 °C)  Pulse:  [58-78] 58  Resp:  [14-18] 18  BP: (104-147)/(55-86) 111/67  SpO2:  [95 %-100 %] 96 %    PHYSICAL EXAM:    NEUROLOGIC:    Mental Status:  A&O x 4, Follows simple commands, mild dysarthria as well as expressive aphasia noted.  Attention, calculation, short-term memory normal.  Cranial nerves: PERRL.  Visual fields full.  EOMI.  mild right facial weakness noted.  Hearing grossly intact. Tongue midline with normal movements.   Motor: Right plantar drift noted.  Motor exam is 5 out of 5 in all extremities bilaterally  Sensation: Intact to light touch bilaterally  Cerebellar: Normal Finger-To-Nose test      LABORATORY DATA:  Last 24 hour labs were reviewed in detail.  Recent Labs   Lab 08/27/24 2018      K 3.8      CO2 28.0   *   BUN 13   CREATSERUM 0.77     Recent Labs   Lab 08/27/24  1852   WBC 6.9   HGB 12.1   .0     Recent Labs   Lab 08/27/24 2018   ALT 15   AST 19     No results for input(s): \"MG\", \"PHOS\" in the last 168 hours.  Last A1c value was  % done  .     Lab Results   Component Value Date     08/28/2024    HDL 47 08/28/2024    TRIG 104 08/28/2024    VLDL 19 08/28/2024        Radiology:    CTA BRAIN + CTA CAROTIDS (CPT=70496/87457)    Result Date: 8/28/2024  CONCLUSION:   1. There are stable scattered areas of hypoattenuation in the left frontal lobe there are better characterized on the recent MRI of the brain and suspicious for acute to subacute infarcts.  The largest of these measures up to 16 mm.  Clinical correlation  and continued follow-up is suggested.  No evidence of hemorrhagic transformation.  2. 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  There is asymmetric diminutive caliber of the left internal carotid artery branches.  The left middle cerebral artery branches are asymmetrically diminutive when compared to the  right which is also likely on developmental basis.  There is marked hyperplasia of bilateral anterior cerebral artery A1 segments with markedly diminutive and dysplastic anterior cerebral artery branches the remainder of the territory period there are wished of small vessels along the bilateral carotid termini.  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  The overall findings likely fall within the spectrum of moyamoya disease, with other  etiologies not entirely excluded.  Clinical correlation recommended.  3. No evidence of occlusion, dissection, or flow-limiting stenosis in the cervical vertebral or carotid arteries. No evidence of hemodynamically significant carotid stenosis by NASCET criteria.  Please see above for further details.  LOCATION:  [id number]   Dictated by (CST): Dickson Collier MD on 8/28/2024 at 12:34 PM     Finalized by (CST): Dickson Collier MD on 8/28/2024 at 12:43 PM       MRI BRAIN (W+WO) (CPT=70553)    Result Date: 8/28/2024  CONCLUSION:  Abnormal brain MRI.  There is a 15 mm focus of restricted diffusion within the posterior left frontal lobe.  There are smaller scattered punctate foci of restriction abnormality within the left central semiovale.  These imaging findings are suspicious for a 15 mm acute infarct within the posterior left frontal lobe with scattered punctate subacute infarcts within the parasagittal left frontal lobe.  These findings are within the watershed territory of the left middle cerebral artery.  A CTA  of the head and neck is recommended for further assessment.  A demyelinating process cannot be entirely excluded and continued clinical and imaging follow-up is recommended for further assessment.   Critical results were discussed with Dr. Rosas at midnight on 8/28/2024. Critical results were read back.   LOCATION:  [facility]    Dictated by ([facility]): Stromberg, LeRoy, MD on 8/27/2024 at 11:55 PM     Finalized by (CST): Stromberg, LeRoy, MD on 8/28/2024 at 0:06 AM       CT BRAIN OR HEAD (CPT=70450)    Result Date: 8/27/2024  CONCLUSION:  1. There are multiple foci of decreased attenuation within the left frontal lobe.  Differential considerations include areas of ischemia/infarction, inflammation, neoplastic processes and infection.  MRI without and with gadolinium recommended for further evaluation.  Critical test results were called to Dr. Rosas at 2156 hours on 8/27/2024 with appropriate read back. 2. No acute  intracranial hemorrhage.    LOCATION:  [employer]   Dictated by (CST): Silvio Larsen MD on 8/27/2024 at 9:49 PM     Finalized by (CST): Silvio Larsen MD on 8/27/2024 at 9:57 PM      ASSESSMENT/PLAN   54 year old female with:    Acute left posterior frontal infarct, as noted on MRI.  Patient still has expressive speech difficulty.  CTA of the head and neck did not show acute LVO/critical stenosis.  Echocardiogram did not report any thrombi or shunting.  , A1c pending.  Patient currently started on dual antiplatelet as well as atorvastatin.  Advised OT/PT/speech/rehab eval.  Will also request hypercoagulable workup and 30-day cardiac event monitor.  Abnormal CTA of the head and neck as reported above, multiple areas of high-grade stenosis and dysplasia reported including left carotid terminus.  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  Findings may be related to spectrum of moyamoya disease.  Will consult neurointervention team for further evaluation/recommendations.    Principal Problem:    Speech disturbance, unspecified type  Active Problems:    Primary hypertension    Hyperglycemia    Acute CVA (cerebrovascular accident) (HCC)       Jarvis Jones MD  Neurohospitalist  [facility]    Disclaimer: This record was dictated using Dragon software. There may be errors due to voice recognition problems that were not realized and corrected during the completion of the note.

## 2024-08-28 NOTE — PAYOR COMM NOTE
--------------  ADMISSION REVIEW     Payor: [facility]  Subscriber #:  [id number]  Authorization Number: [id number]    Admit date: 8/28/24  Admit time:  1:35 AM       REVIEW DOCUMENTATION:     ED Provider Notes        ED Provider Notes signed by David Rosas MD at 8/28/2024 12:08 AM       Author: David Rosas MD Service: -- Author Type: Physician    Filed: 8/28/2024 12:08 AM Date of Service: 8/27/2024  7:33 PM Status: Addendum    : David Rosas MD (Physician)    Related Notes: Original Note by David Rosas MD (Physician) filed at 8/28/2024 12:06 AM           Patient Seen in: [facility] Emergency Department      History     Chief Complaint   Patient presents with    Stroke     Stated Complaint: speach issues starting last wednesday    Subjective:   HPI    Patient 54-year-old female who with history of hypersomnia.  Patient sees a sleep doctor and has been on Xywav for the past 2 years.  Patient states she was having numbness tingling symptoms and started taper off the medication about 3 weeks ago.  Patient went from 4-3.5.  Patient states on Saturday, 3 days ago she started having trouble with her speech.  Patient states she is having trouble rolling her Rs and has to talk slower in order to enunciate properly.  Patient states it has persisted since Saturday.  Patient denies headache, no blurry vision double vision, no weakness in arms arms or legs.  No chest pain, shortness of breath.  No drugs or alcohol.  Patient denies any other new medications or changes in medications.  Remainder of review of systems negative.    Objective:   Past Medical History:    ADHD (attention deficit hyperactivity disorder)    Allergic rhinitis    Idiopathic hypersomnia    Osteoporosis    Seasonal affective disorder (HCC)    Vitamin D deficiency              Past Surgical History:   Procedure Laterality Date    Ablation  2011    uterus       Review of Systems    Positive for stated Chief Complaint: Stroke    Other systems are  as noted in HPI.  Constitutional and vital signs reviewed.      All other systems reviewed and negative except as noted above.    Physical Exam     ED Triage Vitals   BP 08/27/24 1840 138/77   Pulse 08/27/24 1840 78   Resp 08/27/24 1840 16   Temp 08/27/24 1840 97.1 °F (36.2 °C)   Temp src --    SpO2 08/27/24 1840 96 %   O2 Device 08/27/24 2040 None (Room air)       Current Vitals:   Vital Signs  BP: 123/74  Pulse: 61  Resp: 16  Temp: 97.1 °F (36.2 °C)  MAP (mmHg): 89    Oxygen Therapy  SpO2: 98 %  O2 Device: None (Room air)        Physical Exam  GENERAL: Patient resting on the cart in no acute distress.  HEENT: Extraocular muscles intact, pupils equal round reactive to light and accommodation.  Mouth normal, neck supple, no meningismus.  LUNGS: Lungs clear to auscultation bilaterally.  CARDIOVASCULAR: + S1-S2, regular rate and rhythm, no murmurs.  BACK: No CVA tenderness, no midline bony tenderness.  ABDOMEN: + Bowel sounds, soft, nontender, nondistended.  No rebound, no guarding, no hepatosplenomegaly.  EXTREMITIES: Full range of motion, no tenderness, good capillary refill.  SKIN: No rash, good turgor.  NEURO: Patient answers questions appropriately.  No facial droop.  Sensation intact bilateral face.  Tongue midline.  Hearing intact.  Shoulder shrug normal.  Finger-to-nose intact bilaterally.  No pronator drift.  5 out of 5 strength upper and lower extremities.  Sensation tact light touch.  Patient speaks slowly but able to enunciate properly.       ED Course     Labs Reviewed   COMP METABOLIC PANEL (14) - Abnormal; Notable for the following components:       Result Value    Glucose 118 (*)     All other components within normal limits   CBC WITH DIFFERENTIAL WITH PLATELET - Abnormal; Notable for the following components:    HCT 34.2 (*)     All other components within normal limits   URINALYSIS, ROUTINE - Abnormal; Notable for the following components:    Blood Urine 2+ (*)     RBC Urine 3-5 (*)     Bacteria  Urine Rare (*)     Squamous Epi. Cells Few (*)     All other components within normal limits   DRUG SCREEN 8 W/OUT CONFIRMATION, URINE - Abnormal; Notable for the following components:    Amphetamine Urine Presumed Positive (*)     All other components within normal limits    Narrative:     Results of the Urine Drug Screen should be used only for medical purposes.   ETHYL ALCOHOL - Normal   RAINBOW DRAW LAVENDER   RAINBOW DRAW LIGHT GREEN     EKG    Rate, intervals and axes as noted on EKG Report.  Rate: 70  Rhythm: Sinus Rhythm  Reading: Normal sinus rhythm, no acute changes           Chief Complaint   Patient presents with    Stroke     IV Tenecteplase (TNK) administered: No; Patient is not a Candidate for IV TNK due to: Out of time window period or time of onset unknown    Candidate for Endovascular thrombectomy (EVT): No; Patient is not a candidate for Endovascular Thrombectomy due to: Symptom onset > 24 hours      Disposition: There is no disposition on file for this visit.   CT brain1. There are multiple foci of decreased attenuation within the left frontal lobe.  Differential considerations include areas of ischemia/infarction, inflammation, neoplastic processes and infection.  MRI without and with gadolinium recommended for   further evaluation.  Critical test results were called to Dr. Rosas at 2156 hours on 8/27/2024 with appropriate read back.   2. No acute intracranial hemorrhage.   Independent reviewed by myself, no hemorrhage noted        MRI brain did speak with radiology who felt likely acute stroke.        MDM      Patient was not a candidate for TNK or neurointervention given onset of symptoms 3 days ago.  I did speak with neurology who did recommend plain MRI brain.  If this is negative patient can follow-up as an outpatient.  Patient is comfortable with this plan.  Patient does have ibuprofen allergy causes swelling and severe allergic reaction.  Patient states she cannot take aspirin.  Patient  was given Plavix.  [facility]  Patient admitted for further evaluation.  I did consider mass, CVA, medication side effect.  Admission disposition: 8/28/2024 12:02 AM          Medical Decision Making      Disposition and Plan     Clinical Impression:  1. Speech disturbance, unspecified type    2. Acute CVA (cerebrovascular accident) (HCC)         Disposition:  Admit  8/28/2024 12:02 am       Hospital Problems       Present on Admission  Date Reviewed: 6/21/2022            ICD-10-CM Noted POA    * (Principal) Speech disturbance, unspecified type R47.9 8/28/2024 Unknown    Hyperglycemia R73.9 8/28/2024 Yes           8/27 H&P     Chief Complaint: Speech difficulty        Subjective:  History of Present Illness:      Zeenat Kevin is a 54 year old female with history of hypersomnia, ADHD seen a sleep doctor that has had patient on xYWAV( ghb).  Patient reports being on this medication for the past 2 years and was starting to have some numbness and tingling and started tapering off the medication about 3 weeks ago.  Patient had decreased the dose from 4 to 3-1/2 mg and on Saturday started having trouble with her speech.  Precisely she is having trouble with rolling her hours and finds that she has to talk slower in order to enunciate her words.  No visual changes, headache, focalized weakness in the arms or legs.  No fevers, chills, nausea, vomiting, diarrhea or constipation.  No chest pain, shortness of breath, palpitations.  Patient denies drinking any alcohol, using marijuana, or using any other illegal drugs, or tobacco products.            History/Other:  Past Medical History:  Past Medical History       Past Medical History:    ADHD (attention deficit hyperactivity disorder)    Allergic rhinitis    Idiopathic hypersomnia    Osteoporosis    Seasonal affective disorder (HCC)    Vitamin D deficiency        Past Surgical History:   Past Surgical History         Past Surgical History:    Procedure Laterality Date    Ablation   2011     uterus         Family History:   Family History         Family History   Problem Relation Age of Onset    Hypertension Mother      Hypertension Brother      Other (stomach cancer) Paternal Aunt          Social History:    reports that she has never smoked. She has never used smokeless tobacco. She reports that she does not currently use alcohol after a past usage of about 0.8 standard drinks of alcohol per week. She reports that she does not use drugs.      Allergies:   Allergies         Allergies   Allergen Reactions    Ciprofloxacin-Hydrocortisone ITCHING and HIVES       Tingling of hands and feet    Ibuprofen SWELLING    Birch Tar Oil [Betula Alba Oil]         Palmer    Modafinil OTHER (SEE COMMENTS)       Pt felt like throat was closing            Medications:    Medications Ordered Prior to Encounter   No current facility-administered medications on file prior to encounter.             Current Outpatient Medications on File Prior to Encounter   Medication Sig Dispense Refill    amLODIPine Besy-Benazepril HCl 5-20 MG Oral Cap Take 1 capsule by mouth daily.        metoprolol succinate ER 25 MG Oral Tablet 24 Hr Take 1 tablet (25 mg total) by mouth daily.        enalapril 10 MG Oral Tab Take 1 tablet (10 mg total) by mouth daily. (Patient not taking: Reported on 8/27/2024) 90 tablet 1    amphetamine-dextroamphetamine 10 MG Oral Tab Take 10 mg by mouth 2 (two) times daily.                Review of Systems:   A comprehensive review of systems was completed.    Pertinent positives and negatives noted in the HPI.           Objective:  Physical Exam:    /71   Pulse 64   Temp 97.1 °F (36.2 °C)   Resp 18   Ht 5' 2\" (1.575 m)   Wt 132 lb (59.9 kg)   SpO2 99%   BMI 24.14 kg/m²   General: No acute distress, Alert  Respiratory: No rhonchi, no wheezes  Cardiovascular: S1, S2. Regular rate and rhythm  Abdomen: Soft, Non-tender, non-distended, positive bowel  sounds  Neuro: No new focal deficits  Extremities: No edema              Results:  Labs:        Labs Last 24 Hours:         Recent Labs   Lab 08/27/24  1852   RBC 3.90   HGB 12.1   HCT 34.2*   MCV 87.7   MCH 31.0   MCHC 35.4   RDW 12.0   NEPRELIM 3.67   WBC 6.9   .0             Recent Labs   Lab 08/27/24  2018   *   BUN 13   CREATSERUM 0.77   EGFRCR 92   CA 10.0   ALB 4.7      K 3.8      CO2 28.0   ALKPHO 107   AST 19   ALT 15   BILT 0.5   TP 7.0         No results found for: \"PT\", \"INR\"     No results for input(s): \"TROP\", \"TROPHS\", \"CK\" in the last 168 hours.     No results for input(s): \"TROP\", \"PBNP\" in the last 168 hours.     No results for input(s): \"PCT\" in the last 168 hours.     Imaging: Imaging data reviewed in Epic.           Assessment & Plan:  # Acute CVA  - Started  4 days ago so not a TNK candidate  - MRI completes  - Will continue stroke protocol with plavix, statin.  - Neurology on consult.  - Speech eval     # Hypertension  - will hold meds to allow permissive hypertension.           8/28 IM      Chief Complaint: Speech difficulty        Subjective:  Patient reports word finding difficulty. She also has slurred speech when she tries to talk fast. No focal weakness, numbness or tingling. Her symptoms started last Wednesday, improved the next day but worsened again Saturday and never improved.            Objective:  Review of Systems:   A comprehensive review of systems was completed; pertinent positive and negatives stated in subjective.     Vital signs:  Temp:  [97.1 °F (36.2 °C)-98 °F (36.7 °C)] 98 °F (36.7 °C)  Pulse:  [59-78] 73  Resp:  [14-18] 14  BP: (104-147)/(55-86) 104/55  SpO2:  [95 %-100 %] 98 %     Physical Exam:    General: No acute distress, awake and alert  Respiratory: No wheezes, no rhonchi  Cardiovascular: S1, S2, regular rate and rhythm  Abdomen: Soft, Non-tender, non-distended, positive bowel sounds  Neuro: No focal weakness. SILT. Word finding  difficulty noted  Extremities: No edema     Diagnostic Data:    Labs:      Recent Labs   Lab 08/27/24  1852   WBC 6.9   HGB 12.1   MCV 87.7   .0          Recent Labs   Lab 08/27/24  2018   *   BUN 13   CREATSERUM 0.77   CA 10.0   ALB 4.7      K 3.8      CO2 28.0   ALKPHO 107   AST 19   ALT 15   BILT 0.5   TP 7.0      Estimated Creatinine Clearance: 66.8 mL/min (based on SCr of 0.77 mg/dL).     No results for input(s): \"TROP\", \"TROPHS\", \"CK\" in the last 168 hours.     No results for input(s): \"PTP\", \"INR\" in the last 168 hours.      Microbiology  No results found for this visit on 08/27/24.     Imaging: Reviewed in Epic.     Medications:   Scheduled Medications    enoxaparin  40 mg Subcutaneous Daily    atorvastatin  40 mg Oral Nightly    clopidogrel  75 mg Oral Daily                  Assessment & Plan:  #Acute 15 mm left frontal lobe CVA with other scattered punctate infarcts   -Neuro consulted  -MRI reviewed  -Check CTA head and neck  -PT/OT/SLP  -Echo with bubble study  -DAPT, statin  -A1c pending, lipid panel reviewed     #Hypertension  -Out of window for permissive HTN  -Resume home BB with holding parameters  -Hold amlodipine-benazepril     #Hypersomnia  #ADHD  -On Xywa outpatient, hold for now  -Hold Adderall      #Depression  -Trintellix continued  -Also on monthly ketamine            MEDICATIONS ADMINISTERED IN LAST 1 DAY:  aspirin chewable tab 81 mg       Date Action Dose Route User    8/28/2024 0930 Given 81 mg Oral Jada Yeh, MARIANN          clopidogrel (Plavix) tab 75 mg       Date Action Dose Route User    8/28/2024 0003 Given 75 mg Oral Mariah Ware RN          clopidogrel (Plavix) tab 75 mg       Date Action Dose Route User    8/28/2024 0800 Given 75 mg Oral Jada Yeh, MARIANN          gadoterate meglumine (Dotarem) 7.5 MMOL/15ML injection 15 mL       Date Action Dose Route User    8/27/2024 2313 Given 12 mL Intravenous [employer]          metoprolol succinate ER  (Toprol XL) 24 hr tab 25 mg       Date Action Dose Route User    8/28/2024 0949 Given 25 mg Oral Jada Yeh RN          sodium chloride 0.9% infusion       Date Action Dose Route User    8/27/2024 2013 New Bag 125 mL/hr Intravenous Isra Ying RN          sodium chloride 0.9% infusion       Date Action Dose Route User    8/28/2024 0227 New Bag (none) Intravenous Neli Swanson RN          sodium chloride 0.9% infusion       Date Action Dose Route User    8/28/2024 1030 Restarted (none) Intravenous Jada Yeh RN            Vitals (last day)       Date/Time Temp Pulse Resp BP SpO2 Weight O2 Device O2 Flow Rate (L/min) [facility]    08/28/24 1130 97.8 °F (36.6 °C) -- -- -- -- -- -- -- OR    08/28/24 1000 -- 72 -- 137/74 98 % -- -- -- OR    08/28/24 1000 -- -- 18 -- -- -- None (Room air) -- KA    08/28/24 0800 98 °F (36.7 °C) 66 16 115/71 96 % -- None (Room air) -- OR    08/28/24 0737 98 °F (36.7 °C) 73 -- -- 98 % -- -- -- OR    08/28/24 0600 -- 64 -- 104/55 95 % -- -- -- EN    08/28/24 0400 -- 62 -- 116/73 97 % -- -- -- EN    08/28/24 0400 97.5 °F (36.4 °C) -- 14 -- -- -- -- -- EM    08/28/24 0213 -- -- -- -- -- 133 lb 9.6 oz (60.6 kg) -- -- EN    08/28/24 0200 97.5 °F (36.4 °C) 60 16 137/76 97 % -- -- -- EM    08/28/24 0150 -- -- -- -- -- 133 lb 8 oz (60.6 kg) -- -- JH    08/28/24 0115 -- 61 14 -- 100 % -- None (Room air) -- JF    08/28/24 0045 -- 59 18 134/86 100 % -- None (Room air) --     08/28/24 0015 -- 64 16 147/73 98 % -- None (Room air) --     08/27/24 2330 -- 61 16 123/74 98 % -- None (Room air) --     08/27/24 2200 -- 64 -- 142/71 99 % -- -- --     08/27/24 2040 -- 70 18 130/70 98 % -- None (Room air) --     08/27/24 1840 97.1 °F (36.2 °C) 78 16 138/77 96 % 132 lb (59.9 kg) -- -- KM

## 2024-08-28 NOTE — PLAN OF CARE
Stroke booklet given to patient; the following education was provided:     What is stroke  BEFAST - Stroke warning sings and symptoms  How to initiate EMS  Secondary stroke prevention and personalized risk factors  Lifestyle modifications (nutrition and exercise)  Post-stroke recovery and follow-up  Community resources (stroke support group and non-emergent stroke line)    Patient and spouse present during education, patient and spouse receptive to teachings. All pertinent questions and concerns were addressed.      RN Stroke Navigator team  [phone]

## 2024-08-28 NOTE — OCCUPATIONAL THERAPY NOTE
OT orders received and pt chart reviewed. Discussed concerns with pt this PM. Pt reports no acute OT related concerns and reports she is most interested in working with SLP services on acute changes with verbalizing words. Pt presents with no skilled IP OT needs at this time. OT will sign off.

## 2024-08-29 NOTE — PAYOR COMM NOTE
--------------  DISCHARGE REVIEW    Payor: [facility]  Subscriber #:  [id number]  Authorization Number: [id number]    Admit date: 24  Admit time:   1:35 AM  Discharge Date: 2024  5:39 PM     Admitting Physician: Ho Salmeron DO  Attending Physician:  Benito Ramirez DO  Primary Care Physician: Regla Myrick          Discharge Summary Notes        Discharge Summary signed by Daniel Brady DO at 2024  8:34 PM       Author: Daniel Brady DO Specialty: HOSPITALIST, Internal Medicine Author Type: Physician    Filed: 2024  8:34 PM Date of Service: 2024  8:31 PM Status: Signed    : Daniel Brady DO (Physician)           [facility]     Zeenat Kevin Patient Status:  Inpatient    1970 MRN [id number]   [facility] 3NE-A Attending Benito Ramirez DO   Hosp Day # 1 PCP REGLA GALLO DO     Date of Admission: 2024  Date of Discharge: 2024  Discharge Disposition: Home or Self Care    Discharge Diagnosis:   Acute left frontal lobe CVA  Left moyamoya disease  Hypertension  Dyslipidemia  Hypersomnia  ADHD  Depression    History of Present Illness: Zeenat Kevin is a 54 year old female with history of hypersomnia, ADHD seen a sleep doctor that has had patient on xYWAV( ghb).  Patient reports being on this medication for the past 2 years and was starting to have some numbness and tingling and started tapering off the medication about 3 weeks ago.  Patient had decreased the dose from 4 to 3-1/2 mg and on Saturday started having trouble with her speech.  Precisely she is having trouble with rolling her hours and finds that she has to talk slower in order to enunciate her words.  No visual changes, headache, focalized weakness in the arms or legs.  No fevers, chills, nausea, vomiting, diarrhea or constipation.  No chest pain, shortness of breath, palpitations.  Patient denies drinking any alcohol, using marijuana, or  using any other illegal drugs, or tobacco products.     Brief Synopsis: Patient found to have acute 50 mm left frontal lobe CVA with other scattered punctate infarcts in the MCA region.  CTA head and neck was concerning for moyamoya disease.  Neurointerventional consulted and they recommended outpatient cerebral angiogram.  Neurology consulted and patient was started on DAPT and high intensity statin.  Echo with bubble study negative for shunt.  She was discharged home in stable condition    Lace+ Score: 25  59-90 High Risk  29-58 Medium Risk  0-28   Low Risk       TCM Follow-Up Recommendation:  LACE < 29: Low Risk of readmission after discharge from the hospital. No TCM follow-up needed.    Procedures during hospitalization:   None    Incidental or significant findings and recommendations (brief descriptions):  Found to have moyamoya disease, plan for outpatient cerebral angiogram    Lab/Test results pending at Discharge:   None    Consultants:  Neurology  Neurointerventional service    Discharge Medication List:     Discharge Medications        START taking these medications        Instructions Prescription details   aspirin 81 MG Chew  Start taking on: August 29, 2024      Chew 1 tablet (81 mg total) by mouth daily.   Quantity: 30 tablet  Refills: 2     atorvastatin 40 MG Tabs  Commonly known as: Lipitor      Take 1 tablet (40 mg total) by mouth nightly.   Quantity: 30 tablet  Refills: 2     clopidogrel 75 MG Tabs  Commonly known as: Plavix  Start taking on: August 29, 2024      Take 1 tablet (75 mg total) by mouth daily.   Quantity: 30 tablet  Refills: 2            CONTINUE taking these medications        Instructions Prescription details   amLODIPine Besy-Benazepril HCl 5-20 MG Caps  Commonly known as: LOTREL      Take 1 capsule by mouth daily.   Refills: 0     amphetamine-dextroamphetamine 10 MG Tabs  Commonly known as: Adderall      Take 1 tablet (10 mg total) by mouth 3 (three) times daily as needed.    Refills: 0     metoprolol succinate ER 25 MG Tb24  Commonly known as: Toprol XL      Take 1 tablet (25 mg total) by mouth daily.   Refills: 0     progesterone 100 MG Caps  Commonly known as: Prometrium      Take 1 capsule (100 mg total) by mouth nightly.   Refills: 0     vortioxetine 10 MG Tabs  Commonly known as: Trintellix      Take 1 tablet (10 mg total) by mouth daily.   Refills: 0            STOP taking these medications      enalapril 10 MG Tabs  Commonly known as: Vasotec                  Where to Get Your Medications        These medications were sent to [id number] - [facility] [address] AT [facility] [address], [phone], [phone] [address], [facility] [address]      Phone: [phone]   aspirin 81 MG Chew  atorvastatin 40 MG Tabs  clopidogrel 75 MG Tabs         [place] No [place] substances prescribed at discharge.    Follow-up appointment:   Davis Issa MD  [address]  [facility] [address]  [place] [address] [phone]    Schedule an appointment as soon as possible for a visit in 3 week(s)  3-4 weeks follow up    Jess Myrick [facility] [address] [phone]    Schedule an appointment as soon as possible for a visit in 1 week(s)      Cornelio Guzman MD  [address], [address] [address] [phone]    Follow up  office will f/u with you    Appointments for Next 30 Days 8/28/2024 - 9/27/2024      None            Vital signs:  Temp:  [97.5 °F (36.4 °C)-98 °F (36.7 °C)] 98 °F (36.7 °C)  Pulse:  [58-73] 66  Resp:  [14-18] 16  BP: (104-147)/(55-86) 140/82  SpO2:  [95 %-100 %] 96 %    Physical Exam:    See progress note dated same day.  -----------------------------------------------------------------------------------------------  PATIENT DISCHARGE INSTRUCTIONS: See electronic chart    Daniel Brady DO    Time spent:  32 minutes    Electronically signed by Daniel Brady DO on 8/28/2024  8:34  PM         REVIEWER COMMENTS

## 2024-08-29 NOTE — DISCHARGE SUMMARY
[place] [facility]OSPITAL[facility]  DISCHARGE SUMMARY     Zeenat Kevin Patient Status:  Inpatient    1970 MRN [id number]   Location [facility] 3NE-A Attending Benito Ramirez DO   Hosp Day # 1 PCP SABINO PERALES REGLA     Date of Admission: 2024  Date of Discharge: 2024  Discharge Disposition: Home or Self Care    Discharge Diagnosis:   Acute left frontal lobe CVA  Left moyamoya disease  Hypertension  Dyslipidemia  Hypersomnia  ADHD  Depression    History of Present Illness: Zeenat Kevin is a 54 year old female with history of hypersomnia, ADHD seen a sleep doctor that has had patient on xYWAV( ghb).  Patient reports being on this medication for the past 2 years and was starting to have some numbness and tingling and started tapering off the medication about 3 weeks ago.  Patient had decreased the dose from 4 to 3-1/2 mg and on Saturday started having trouble with her speech.  Precisely she is having trouble with rolling her hours and finds that she has to talk slower in order to enunciate her words.  No visual changes, headache, focalized weakness in the arms or legs.  No fevers, chills, nausea, vomiting, diarrhea or constipation.  No chest pain, shortness of breath, palpitations.  Patient denies drinking any alcohol, using marijuana, or using any other illegal drugs, or tobacco products.     Brief Synopsis: Patient found to have acute 50 mm left frontal lobe CVA with other scattered punctate infarcts in the MCA region.  CTA head and neck was concerning for moyamoya disease.  Neurointerventional consulted and they recommended outpatient cerebral angiogram.  Neurology consulted and patient was started on DAPT and high intensity statin.  Echo with bubble study negative for shunt.  She was discharged home in stable condition    Lace+ Score: 25  59-90 High Risk  29-58 Medium Risk  0-28   Low Risk       TCM Follow-Up Recommendation:  LACE < 29: Low Risk of readmission after discharge from the hospital. No TCM  follow-up needed.    Procedures during hospitalization:   None    Incidental or significant findings and recommendations (brief descriptions):  Found to have moyamoya disease, plan for outpatient cerebral angiogram    Lab/Test results pending at Discharge:   None    Consultants:  Neurology  Neurointerventional service    Discharge Medication List:     Discharge Medications        START taking these medications        Instructions Prescription details   aspirin 81 MG Chew  Start taking on: August 29, 2024      Chew 1 tablet (81 mg total) by mouth daily.   Quantity: 30 tablet  Refills: 2     atorvastatin 40 MG Tabs  Commonly known as: Lipitor      Take 1 tablet (40 mg total) by mouth nightly.   Quantity: 30 tablet  Refills: 2     clopidogrel 75 MG Tabs  Commonly known as: Plavix  Start taking on: August 29, 2024      Take 1 tablet (75 mg total) by mouth daily.   Quantity: 30 tablet  Refills: 2            CONTINUE taking these medications        Instructions Prescription details   amLODIPine Besy-Benazepril HCl 5-20 MG Caps  Commonly known as: LOTREL      Take 1 capsule by mouth daily.   Refills: 0     amphetamine-dextroamphetamine 10 MG Tabs  Commonly known as: Adderall      Take 1 tablet (10 mg total) by mouth 3 (three) times daily as needed.   Refills: 0     metoprolol succinate ER 25 MG Tb24  Commonly known as: Toprol XL      Take 1 tablet (25 mg total) by mouth daily.   Refills: 0     progesterone 100 MG Caps  Commonly known as: Prometrium      Take 1 capsule (100 mg total) by mouth nightly.   Refills: 0     vortioxetine 10 MG Tabs  Commonly known as: Trintellix      Take 1 tablet (10 mg total) by mouth daily.   Refills: 0            STOP taking these medications      enalapril 10 MG Tabs  Commonly known as: Vasotec                  Where to Get Your Medications        These medications were sent to [id number] - [place], [place] - [address] AT [facility] [address], [phone],  [phone]  [address] [facility] [address]      Phone: [phone]   aspirin 81 MG Chew  atorvastatin 40 MG Tabs  clopidogrel 75 MG Tabs         [place] No controlled substances prescribed at discharge.    Follow-up appointment:   Davis Issa MD  [address]  [facility] [address]  [place] [id number] [phone]    Schedule an appointment as soon as possible for a visit in 3 week(s)  3-4 weeks follow up    Jess Myrick [facility] [address] [phone]    Schedule an appointment as soon as possible for a visit in 1 week(s)      Cornelio Guzman MD  [address] [facility] [address]  [place] [address]  [phone]    Follow up  office will f/u with you    Appointments for Next 30 Days 8/28/2024 - 9/27/2024      None            Vital signs:  Temp:  [97.5 °F (36.4 °C)-98 °F (36.7 °C)] 98 °F (36.7 °C)  Pulse:  [58-73] 66  Resp:  [14-18] 16  BP: (104-147)/(55-86) 140/82  SpO2:  [95 %-100 %] 96 %    Physical Exam:    See progress note dated same day.  -----------------------------------------------------------------------------------------------  PATIENT DISCHARGE INSTRUCTIONS: See electronic chart    Daniel Brady DO    Time spent:  32 minutes

## 2024-08-29 NOTE — TELEPHONE ENCOUNTER
Pt is scheduled for a Diagnostic Cerebral Angiogram on 9.3.24 with Dr. Guzman.     Y Neuro IR (IVS) order placed  Y Pre-op lab order placed  Y Emailed LILIA group in outlook with procedure information including name/, MD, date, time, dx, sx, and location.    Y PA needed routed to PA team for initiation.   Y Entire checklist completed      Dx: Afsaneh Shelby  CPT Codes: 36950, 65294, 84707, 42358, 00449.  Clearances requested: PCP  Prior Authorization: SHELDON GIANG

## 2024-08-30 NOTE — PAT NURSING NOTE
PAT call with patient. The following instructions were given and sent through her My Chart. Questions were answered and she verbalized understanding.    PreOp Instructions    You are scheduled for: a Cardiac Procedure    Date of Procedure: 09/03/24    Diet Instructions: Do not eat or drink anything after midnight including gum, mints, candy, etc.    Medications to Stop: Hold herbal supplements and vitamins    Skin Prep: Shower with antibacterial soap using a clean washcloth, prior to procedure. Once dried off, no lotions/powders/creams/ointments, etc.    Arrival Time: The day prior to your procedure you will receive a phone call before 6:00 pm with your arrival time. If you haven't received a phone call, please check your voicemail messages., If you did not receive a voice mail and it is after 6:00 pm, please call the nursing supervisor at [phone].    Driving After Procedure: If sedation is given, you WILL NOT be able to drive home. You will need a responsible adult  to drive you home.    Discharge Teaching: Your nurse will give you specific instructions before discharge, Any questions, please call the physician's office       parking is available starting at 6 am or park in the [place] parking garage at [facility]. Check in at the [facility] reception desk. Our  will be there to check you in for your procedure. Please bring your insurance cards and ID with you.                                                                                                                                      Please DO NOT respond to this message, the inbasket is not monitored for messages. For any questions, please call the physician's office.

## 2024-08-30 NOTE — TELEPHONE ENCOUNTER
Prior authorization request completed for: Diagnostic Cerebral Angiogram    Authorization #NO PRIOR AUTHORIZATION REQUIRED   Authorization dates: 09/03/24  CPT codes approved: 64746, 83498, 19335, 44890, 81046  Number of visits/dates of service approved: Outpatient  Physician: Thomas   Location: [facility]    Call Ref#: [id number]  Representative Name: Elena MENCHACA   Insurance Carrier: BC

## 2024-09-03 ENCOUNTER — ANESTHESIA (OUTPATIENT)
Dept: INTERVENTIONAL RADIOLOGY/VASCULAR | Facility: HOSPITAL | Age: 54
End: 2024-09-03
Payer: COMMERCIAL

## 2024-09-03 ENCOUNTER — APPOINTMENT (OUTPATIENT)
Dept: CT IMAGING | Facility: HOSPITAL | Age: 54
End: 2024-09-03
Attending: NURSE PRACTITIONER

## 2024-09-03 ENCOUNTER — HOSPITAL ENCOUNTER (INPATIENT)
Dept: INTERVENTIONAL RADIOLOGY/VASCULAR | Facility: HOSPITAL | Age: 54
LOS: 2 days | Discharge: HOME | End: 2024-09-05
Attending: NEUROLOGICAL SURGERY | Admitting: NEUROLOGICAL SURGERY

## 2024-09-03 PROBLEM — I67.5 MOYAMOYA: Status: ACTIVE | Noted: 2024-09-03

## 2024-09-03 RX ORDER — MIDAZOLAM HYDROCHLORIDE 1 MG/ML
INJECTION INTRAMUSCULAR; INTRAVENOUS AS NEEDED
Status: DISCONTINUED | OUTPATIENT
Start: 2024-09-03 | End: 2024-09-03 | Stop reason: SURG

## 2024-09-03 RX ADMIN — SODIUM CHLORIDE: 9 INJECTION, SOLUTION INTRAVENOUS at 09:38:00

## 2024-09-03 RX ADMIN — ATORVASTATIN CALCIUM 40 MG: 40 TABLET, FILM COATED ORAL at 20:19:00

## 2024-09-03 RX ADMIN — SODIUM CHLORIDE: 9 INJECTION, SOLUTION INTRAVENOUS at 08:35:00

## 2024-09-03 RX ADMIN — IODIXANOL 100 ML: 320 INJECTION, SOLUTION INTRAVASCULAR at 11:20:00

## 2024-09-03 RX ADMIN — MIDAZOLAM HYDROCHLORIDE 2 MG: 1 INJECTION INTRAMUSCULAR; INTRAVENOUS at 09:42:00

## 2024-09-03 RX ADMIN — SODIUM CHLORIDE: 9 INJECTION, SOLUTION INTRAVENOUS at 12:05:00

## 2024-09-03 NOTE — CONSULTS
[facility]  Neurocritical Care Consult Note    Zeenat Kevin Patient Status:  Outpatient    1970 MRN [id number]   Location [facility] INTERVENTIONAL SUITES Attending Cornelio Guzman MD   Hosp Day # 0 PCP REGLA GALLO DO       Reason for Consultation:   [place]cl[place] mcclain    HPI:   Patient is a 54 year old female with a h/o adhd and recent L hemispheric ischemic stroke due to moyamoya disease p/w AMS, R facial droop and speech difficulties 9/3.   Pt c/o speech difficulties  and w/u revealed mri brain with L frontal AIS and cta with L ica terminus steno-occlusive disease consistent with moyamoya thus started on dapt and decision made to proceed with cerebral angio for further eval.   Prior to angio on 9/3, pt noted to have AMS, R facial droop and slurred speech with SBP 60s and HR 30s thus given IVF and vasopressors with improvement in symptoms and underwent DCA, and then pt was transferred to [facility] for further monitoring.     Past Medical History:    ADHD (attention deficit hyperactivity disorder)    Allergic rhinitis    Idiopathic hypersomnia    Osteoporosis    Seasonal affective disorder (HCC)    Vitamin D deficiency       Past Surgical History:   Procedure Laterality Date    Ablation      uterus       Medications Prior to Admission   Medication Sig Dispense Refill Last Dose    cetirizine 10 MG Oral Tab Take 1 tablet (10 mg total) by mouth daily.   2024    estradiol 0.025 MG/24HR Transdermal Patch Biweekly Place 1 patch onto the skin twice a week.       progesterone 100 MG Oral Cap Take 1 capsule (100 mg total) by mouth nightly.   2024    vortioxetine 10 MG Oral Tab Take 1 tablet (10 mg total) by mouth daily.   2024    aspirin 81 MG Oral Chew Tab Chew 1 tablet (81 mg total) by mouth daily. 30 tablet 2 9/3/2024 at 0630    atorvastatin 40 MG Oral Tab Take 1 tablet (40 mg total) by mouth nightly. 30 tablet 2 2024    clopidogrel 75 MG Oral Tab Take 1 tablet (75 mg  total) by mouth daily. 30 tablet 2 9/3/2024 at 0630    amLODIPine Besy-Benazepril HCl 5-20 MG Oral Cap Take 1 capsule by mouth daily.   9/2/2024    metoprolol succinate ER 25 MG Oral Tablet 24 Hr Take 1 tablet (25 mg total) by mouth daily.   9/2/2024    amphetamine-dextroamphetamine 10 MG Oral Tab Take 1 tablet (10 mg total) by mouth 3 (three) times daily as needed.   Past Week     Allergies   Allergen Reactions    Ciprofloxacin-Hydrocortisone ITCHING and HIVES     Tingling of hands and feet    Ibuprofen SWELLING    Birch Tar Oil [Betula Alba Oil]      [place]    Modafinil OTHER (SEE COMMENTS)     Pt felt like throat was closing     Social History     Socioeconomic History    Marital status:    Tobacco Use    Smoking status: Never    Smokeless tobacco: Never   Vaping Use    Vaping status: Never Used   Substance and Sexual Activity    Alcohol use: Not Currently     Alcohol/week: 0.8 standard drinks of alcohol     Types: 1 drink(s) per week    Drug use: No    Sexual activity: Yes     Partners: Male     Birth control/protection: Surgical     Comment: ablation    Other Topics Concern    Caffeine Concern No    Stress Concern No    Weight Concern No    Special Diet No    Exercise No    Seat Belt Yes     Family History   Problem Relation Age of Onset    Hypertension Mother     Hypertension Brother     Other (stomach cancer) Paternal Aunt        Current Meds:  Current Facility-Administered Medications   Medication Dose Route Frequency    norepinephrine (Levophed) 4 mg/250mL infusion premix  0.5-30 mcg/min Intravenous Continuous    sodium chloride 0.9% infusion   Intravenous Continuous     Facility-Administered Medications Ordered in Other Encounters   Medication Dose Route Frequency    midazolam (Versed) 2 MG/2ML injection   Intravenous PRN    fentaNYL (Sublimaze) 50 mcg/mL injection   Intravenous PRN       Review of Systems:     Constitutional:    Denies unusual weight loss or weight gain, fever/chills or night  sweats.  HEENT:                Denies changes in vision or difficulty swallowing.  Pulm:                    Denies dyspnea, cough, or sputum production  Cardiac:               Denies chest pain, palpitations or lower extremity edema.  GI:                         Denies constipation, heartburn or melena.  :                       Denies dysuria or hematuria.  Skin:                     Denies rashes or open areas.  Neuro:                  As per HPI    All other systems were reviewed and are negative.    Vitals:   Temp:  [98 °F (36.7 °C)] 98 °F (36.7 °C)  Pulse:  [42-82] 71  Resp:  [12-26] 14  BP: ()/(46-95) 116/74  SpO2:  [97 %-100 %] 100 %  Body mass index is 24.14 kg/m².    Intake/Output:  No intake or output data in the 24 hours ending 09/03/24 1026    Physical Examination:   General- No acute distress  CV- RRR  Resp- CTA Bilat  Neuro-  Mental status- awake and alert, regards and follows commands, oriented x3, speech slow and hesitant  Cranial nerves- pupils equally round and reactive to light, extraocular muscles intact, mild R facial droop, visual fields full  Motor- 5/5 throughout  Sens-  Intact to light touch    Diagnostics:   CT STROKE BRAIN (NO IV)(CPT=70450)    Result Date: 9/3/2024  PROCEDURE:  CT STROKE BRAIN (NO IV)(CPT=70450)  COMPARISON:  [facility] , MR, MRI BRAIN (W+WO) (CPT=70553), 8/27/2024, 10:43 PM.  [facility] , CT, CTA BRAIN + CTA CAROTIDS (CPT=70496/50811), 8/28/2024, 11:50 AM.  INDICATIONS:  Lightheaded, prior slurred speech  TECHNIQUE:  Noncontrast CT scanning is performed through the brain.  Dose reduction techniques were used. Dose information is transmitted to the ACR (American College of Radiology) NRDR (National Radiology Data Registry) which includes the Dose Index Registry.  PATIENT STATED HISTORY: (As transcribed by Technologist)  Lightheaded.    FINDINGS: No evidence of hemorrhage or extra-axial fluid collection.  Expected evolution of acute infarct in left frontal lobe is noted.   Previously noted low-density has partially resolved.  Additional low-density regions are stable.  Ventricles and sulci are appropriate for the patient's age.  No mass effect.  Visualized portions of paranasal sinuses are unremarkable.  Visualized portions of mastoid air cells are unremarkable.  Visualized portions of orbits are unremarkable.  IMPRESSION: Expected evolution of acute infarct in the left frontal lobe.  No hemorrhage or extra-axial fluid collection.  Critical results were called to Nurse Patricia at 900 hours on 9/3/2024.  There was appropriate read back.    LOCATION:  [facility]   Dictated by (CST): Jerod Jean MD on 9/03/2024 at 8:53 AM     Finalized by (CST): Jerod Jean MD on 9/03/2024 at 9:01 AM        Lab Review     Recent Labs   Lab 08/27/24 2018 09/03/24  0751    141   K 3.8 3.7    110   CO2 28.0 26.0   * 135*   BUN 13 11   CREATSERUM 0.77 0.83     Recent Labs   Lab 08/27/24  1852 09/03/24  0751   WBC 6.9 5.4   HGB 12.1 11.3*   .0 237.0       Assesment/Plan:     Neuro:  Moyamoya disease- c/b L hemispheric ischemia.   Now s/p DCA, plan for possible surgical revascularization per Neurosurg.   Cont neurochecks/pt/ot/st.  Cardiac:  sbp goal 120-160, wean vasopressors as betzaida.  Pulmonary:  Stable on RA  Renal:  IVFs, monitor BUN/Cr  GI:  PO as tolerated  Heme/ID:  Afebrile, no leukocytosis  Endocrine/Rheum:  Monitor glucose, sliding scale insulin prn  DVT Prophylaxis:  SCD’s    Goals of the Day: neurochecks, sbp goals   A total of 45 minutes of critical care time (exclusive of billable procedures) was administered to manage and/or prevent neurologic instability. This involved direct patient intervention, complex decision making, and/or extensive discussions with the patient, family, and clinical staff.    Thank you for allowing me to participate in the care of this patient.     Jackson Stinson MD, [facility]  Medical Director of System Neurosciences  Chief, Section of  Neurocritical Care  [facility]

## 2024-09-03 NOTE — ANESTHESIA POSTPROCEDURE EVALUATION
[facility]    Zeenat Kevin Patient Status:  Inpatient   Age/Gender 54 year old female MRN [id number]   Location [facility] 6NE-A Attending Cornelio Guzman MD   Hosp Day # 0 PCP REGLA GALLO DO       Anesthesia Post-op Note        Procedure Summary       Date: 09/03/24 Room / Location: [facility] Interventional Suites    Anesthesia Start: 0938 Anesthesia Stop: 1140    Procedure: IVS NEURO Diagnosis:       Moyamoya      Moyamoya    Scheduled Providers:  Anesthesiologist:     Anesthesia Type: MAC ASA Status: Not recorded            Anesthesia Type: MAC    Vitals Value Taken Time   /70 09/03/24 1140   Temp 97.5 °F (36.4 °C) 09/03/24 1140   Pulse 69 09/03/24 1143   Resp 14 09/03/24 1143   SpO2 100 % 09/03/24 1143   Vitals shown include unfiled device data.    Patient Location: ICU    Anesthesia Type: MAC    Airway Patency: patent    Postop Pain Control: adequate    Mental Status: preanesthetic baseline    Nausea/Vomiting: none    Cardiopulmonary/Hydration status: stable euvolemic    Complications: no apparent anesthesia related complications    Postop vital signs: stable    Dental Exam: Unchanged from Preop    Patient to be discharged from PACU when criteria met.

## 2024-09-03 NOTE — SLP NOTE
ADULT SWALLOWING EVALUATION    ASSESSMENT    ASSESSMENT/OVERALL IMPRESSION:  Patient seen for swallowing evaluation per protocol as she exhibited slurred speech/facial droop. Patient presented to ED 8/27/24 due to slurred speech and language difficulty. She returns today for diagnostic cerebral angiogram due to concern for moyamoya on imaging. Patient is alert and up in bed, able to sit up after procedure. She notes slurred speech was improving prior to returning to hospital today but she reported difficulty with /r/ and /s/ sounds. She denied any difficulty with verbal expression or receptive language. This morning, before her procedure she was noted to develop right facial droop, mild dysarthria and mild expressive aphasia. Her  is present and supportive throughout.    Oral mechanism exam remarkable for subtle reduction in right facial tone, strength and ROM. Patient  reported no definite change from her baseline. Patient with intact oral retrieval and containment with thin liquids by straw and solid trials. Oral prep and transit appeared functional. There was no residue visualized. Mastication of solids WFL. Laryngeal excursion judged to be of adequate strength and timeliness to palpation. There were no overt signs of aspiration noted.    Recommend initiate regular consistency solids and thin liquids observing aspiration precautions. Plan to continue to follow for completion of communication assessment pending outcome of workup and clinical status.     Little Assessment of Swallow Function Score: No abnormality detected (197)    RECOMMENDATIONS   Diet Recommendations - Solids: Regular  Diet Recommendations - Liquids: Thin Liquids                           Aspiration Precautions: Upright position;Slow rate;Small bites and sips  Medication Administration Recommendations: No restrictions  Treatment Plan/Recommendations:  (comm eval pending clinical status and workup outcome)    HISTORY   MEDICAL  HISTORY  Reason for Referral: RN dysphagia screen    Problem List  Active Problems:    * No active hospital problems. *      Past Medical History  Past Medical History:    ADHD (attention deficit hyperactivity disorder)    Allergic rhinitis    Idiopathic hypersomnia    Osteoporosis    Seasonal affective disorder (HCC)    Vitamin D deficiency       Prior Living Situation: Home with spouse  Diet Prior to Admission: Regular;Thin liquids  Precautions: Aspiration    Patient/Family Goals: to get better    SWALLOWING HISTORY  Current Diet Consistency: NPO  Dysphagia History: denied  Imaging Results:   CT stroke brain from 9/3/24 revealed:  IMPRESSION:   Expected evolution of acute infarct in the left frontal lobe.  No hemorrhage or extra-axial fluid collection.      Critical results were called to Nurse Patricia at 900 hours on 9/3/2024.  There was appropriate read back.          LOCATION:  [facility]         Dictated by (CST): Jerod Jean MD on 9/03/2024 at 8:53 AM       Finalized by (CST): Jerod Jean MD on 9/03/2024 at 9:01 AM       SUBJECTIVE       OBJECTIVE   ORAL MOTOR EXAMINATION  Dentition: Natural;Functional  Symmetry: Reduced right facial  Strength: Reduced right facial  Tone: Reduced right facial  Range of Motion: Reduced right facial  Rate of Motion: Reduced    Voice Quality: Clear  Respiratory Status: Unlabored  Consistencies Trialed: Thin liquids;Hard solid  Method of Presentation: Staff/Clinician assistance;Self presentation;Straw;Single sips;Consecutive swallows  Patient Positioning: Midline;Reclined (reverse trendelenberg)    Oral Phase of Swallow: Within Functional Limits                      Pharyngeal Phase of Swallow: Within Functional Limits           (Please note: Silent aspiration cannot be evaluated clinically. Videofluoroscopic Swallow Study is required to rule-out silent aspiration.)    Esophageal Phase of Swallow: No complaints consistent with possible esophageal involvement               GOALS  Goal #1 Patient will participate in communication assessment  In Progress     FOLLOW UP  Treatment Plan/Recommendations:  (comm eval pending clinical status and workup outcome)  Number of Visits to Meet Established Goals: 1  Follow Up Needed (Documentation Required): Yes  SLP Follow-up Date: 09/04/24    Thank you for your referral.   If you have any questions, please contact Antonio Miles MS CCC-SLP  Pager [phone]

## 2024-09-03 NOTE — H&P
History & Physical Examination    Patient Name: Zeenat Kevin  MRN: [id number]  [facility]: [id number]  YOB: 1970    Diagnosis: Symptomatic left sided Moyamoya disease    Present Illness: Zeenat Kevin is a 54 year old female who presented 8/27/24 with acute slurred speech and language difficulty.  She denied to me any right sided numbness or weakness.  MRI brain demonstrated a left hemisphere focus of infarct.  CTA head and neck demonstrated left ICA terminus steno-occlusive disease consistent with moyamoya.  She has been started on aspirin and Plavix by neurology.  She has a history of labile blood pressures for which she is on medication.     Has been NPO MN  Does  not take other anticoagulants.  Denies recent illness such as fevers or GI symptoms.  Labs from 8/27/24 reviewed: BMP, CBC, PT/INR all WNL.    In the lab holding area. Patient became diaphoretic with low HR in the 30s and SBP in the 60s. 0.5mg Atropine given with a fluid bolus. HOB was placed flat. SBP increased to 105. Levophed gtt started. Initially, she was not speaking or following commands.    BP and HR improved. Current NIHSS 3 (Right NLF, slurred speech and mild aphasia)  Stroke alert called. LKW 0730.          No current facility-administered medications for this encounter.       Allergies:   Allergies   Allergen Reactions    Ciprofloxacin-Hydrocortisone ITCHING and HIVES     Tingling of hands and feet    Ibuprofen SWELLING    Birch Tar Oil [Betula Alba Oil]      [place]    Modafinil OTHER (SEE COMMENTS)     Pt felt like throat was closing       Past Medical History:    ADHD (attention deficit hyperactivity disorder)    Allergic rhinitis    Idiopathic hypersomnia    Osteoporosis    Seasonal affective disorder (HCC)    Vitamin D deficiency     Past Surgical History:   Procedure Laterality Date    Ablation  2011    uterus     Family History   Problem Relation Age of Onset    Hypertension Mother     Hypertension Brother     Other  (stomach cancer) Paternal Aunt      Social History     Tobacco Use    Smoking status: Never    Smokeless tobacco: Never   Substance Use Topics    Alcohol use: Not Currently     Alcohol/week: 0.8 standard drinks of alcohol     Types: 1 drink(s) per week       SYSTEM Check if Review is Normal Check if Physical Exam is Normal If not normal, please explain:   HEENT [X] [X]    NECK & BACK [X] [X]    HEART [X] [X]    LUNGS [X] [X]    ABDOMEN [X] [X]    UROGENITAL [ ] [ ] deferred   EXTREMITIES [X] [X]    Pedal Pulses X X      Neurological Exam:  Mental status: Oriented to person, place, and time   Speech: Fluent, + dysarthria, mixes up left and right, naming intact.  CN: II-XII grossly intact  Memory and comprehension: Intact   Motor: No drift, no focal arm or leg weakness.   Sensory: Intact to light touch    [ x ] I have discussed the risks and benefits and alternatives with the patient/family.  They understand and agree to proceed with plan of care.  [ x ] I have reviewed the History and Physical done within the last 30 days.  Any changes noted above.    Patient to CT before angiogram.  Requested anesthesia to help with support with hemodynamics.    Sheridan Anand, APRN  9/3/2024, 7:42 AM  Spectre [id number]

## 2024-09-03 NOTE — ANESTHESIA PREPROCEDURE EVALUATION
PRE-OP EVALUATION    Patient Name: Zeenat Kevin    Admit Diagnosis: Moyamoya [I67.5]    Pre-op Diagnosis: * No pre-op diagnosis entered *        Anesthesia Procedure: IVS NEURO    * No surgeons found in log *    Pre-op vitals reviewed.  Temp: 97.5 °F (36.4 °C)  Pulse: 71  Resp: 17  BP: 128/70  AO: 159/69  SpO2: 100 %  Body mass index is 24.14 kg/m².    Current medications reviewed.  Hospital Medications:   [COMPLETED] heparin (Porcine) 5000 UNIT/ML injection        [COMPLETED] norepinephrine (Levophed) 4 mg/250mL infusion premix        [COMPLETED] lidocaine (Xylocaine) 1 % injection        [COMPLETED] atropine 0.1 MG/ML injection 0.5 mg  0.5 mg Intravenous Once    norepinephrine (Levophed) 4 mg/250mL infusion premix  0.5-30 mcg/min Intravenous Continuous    [COMPLETED] sodium chloride 0.9 % IV bolus 1,000 mL  1,000 mL Intravenous Once    Followed by    sodium chloride 0.9% infusion   Intravenous Continuous    [COMPLETED] lidocaine (Xylocaine) 1 % injection        lactated ringers infusion   Intravenous Continuous    lactated ringers IV bolus 500 mL  500 mL Intravenous Once PRN    acetaminophen (Tylenol Extra Strength) tab 1,000 mg  1,000 mg Oral Once PRN    Or    HYDROcodone-acetaminophen (Norco) 5-325 MG per tab 1 tablet  1 tablet Oral Once PRN    Or    HYDROcodone-acetaminophen (Norco) 5-325 MG per tab 2 tablet  2 tablet Oral Once PRN    atropine 0.1 MG/ML injection 0.5 mg  0.5 mg Intravenous PRN    labetalol (Trandate) 5 mg/mL injection 5 mg  5 mg Intravenous Q5 Min PRN    ondansetron (Zofran) 4 MG/2ML injection 4 mg  4 mg Intravenous Q6H PRN    prochlorperazine (Compazine) 10 MG/2ML injection 5 mg  5 mg Intravenous Q8H PRN    naloxone (Narcan) 0.4 MG/ML injection 0.08 mg  0.08 mg Intravenous PRN    fentaNYL (Sublimaze) 50 mcg/mL injection 25 mcg  25 mcg Intravenous Q5 Min PRN    Or    fentaNYL (Sublimaze) 50 mcg/mL injection 50 mcg  50 mcg Intravenous Q5 Min PRN    HYDROmorphone (Dilaudid) 1 MG/ML injection  0.2 mg  0.2 mg Intravenous Q5 Min PRN    Or    HYDROmorphone (Dilaudid) 1 MG/ML injection 0.4 mg  0.4 mg Intravenous Q5 Min PRN    Or    HYDROmorphone (Dilaudid) 1 MG/ML injection 0.6 mg  0.6 mg Intravenous Q5 Min PRN    [COMPLETED] iodixanol (VISIPAQUE) 320 MG/ML injection 200 mL  200 mL Intravenous ONCE PRN       Outpatient Medications:     Medications Prior to Admission   Medication Sig Dispense Refill Last Dose    cetirizine 10 MG Oral Tab Take 1 tablet (10 mg total) by mouth daily.   9/2/2024    estradiol 0.025 MG/24HR Transdermal Patch Biweekly Place 1 patch onto the skin twice a week.       progesterone 100 MG Oral Cap Take 1 capsule (100 mg total) by mouth nightly.   9/2/2024    vortioxetine 10 MG Oral Tab Take 1 tablet (10 mg total) by mouth daily.   9/2/2024    aspirin 81 MG Oral Chew Tab Chew 1 tablet (81 mg total) by mouth daily. 30 tablet 2 9/3/2024 at 0630    atorvastatin 40 MG Oral Tab Take 1 tablet (40 mg total) by mouth nightly. 30 tablet 2 9/2/2024    clopidogrel 75 MG Oral Tab Take 1 tablet (75 mg total) by mouth daily. 30 tablet 2 9/3/2024 at 0630    amLODIPine Besy-Benazepril HCl 5-20 MG Oral Cap Take 1 capsule by mouth daily.   9/2/2024    metoprolol succinate ER 25 MG Oral Tablet 24 Hr Take 1 tablet (25 mg total) by mouth daily.   9/2/2024    amphetamine-dextroamphetamine 10 MG Oral Tab Take 1 tablet (10 mg total) by mouth 3 (three) times daily as needed.   Past Week       Allergies: Ciprofloxacin-hydrocortisone, Ibuprofen, Birch tar oil [betula alba oil], and Modafinil      Anesthesia Evaluation        Anesthetic Complications           GI/Hepatic/Renal                                 Cardiovascular            MET: >4      (+) hypertension                                     Endo/Other      (-) diabetes                            Pulmonary      (-) asthma         (-) shortness of breath            Neuro/Psych          (+) CVA                            Past Surgical History:   Procedure  Laterality Date    Ablation  2011    uterus     Social History     Socioeconomic History    Marital status:    Tobacco Use    Smoking status: Never    Smokeless tobacco: Never   Vaping Use    Vaping status: Never Used   Substance and Sexual Activity    Alcohol use: Not Currently     Alcohol/week: 0.8 standard drinks of alcohol     Types: 1 drink(s) per week    Drug use: No    Sexual activity: Yes     Partners: Male     Birth control/protection: Surgical     Comment: ablation    Other Topics Concern    Caffeine Concern No    Stress Concern No    Weight Concern No    Special Diet No    Exercise No    Seat Belt Yes     History   Drug Use No     Available pre-op labs reviewed.  Lab Results   Component Value Date    WBC 5.4 09/03/2024    RBC 3.71 (L) 09/03/2024    HGB 11.3 (L) 09/03/2024    HCT 32.8 (L) 09/03/2024    MCV 88.4 09/03/2024    MCH 30.5 09/03/2024    MCHC 34.5 09/03/2024    RDW 11.9 09/03/2024    .0 09/03/2024     Lab Results   Component Value Date     09/03/2024    K 3.7 09/03/2024     09/03/2024    CO2 26.0 09/03/2024    BUN 11 09/03/2024    CREATSERUM 0.83 09/03/2024     (H) 09/03/2024    CA 8.7 09/03/2024     Lab Results   Component Value Date    INR 1.00 09/03/2024         Airway      Mallampati: II  Mouth opening: 3 FB  TM distance: 4 - 6 cm  Neck ROM: full Cardiovascular    Cardiovascular exam normal.         Dental    Dentition appears grossly intact         Pulmonary    Pulmonary exam normal.                 Other findings              ASA: 2   Plan: MAC  NPO status verified and     Post-procedure pain management plan discussed with surgeon and patient.      Plan/risks discussed with: patient                Present on Admission:  **None**

## 2024-09-03 NOTE — PLAN OF CARE
Received patient post angiogram.   Alert, awake. Minimal right facial droop. Slurring words. Moving all extremities without problems. Denies pain. Tele SB with rates in mid 50s. Around 30 point discrepency between arterial line and b/p cuff reading, clarified with Sheridan, use arterial line as long as good waveform (art line has good waveform). ST to see as failed bedside swallow eval due to droop/slurring. Spouse at bedside.

## 2024-09-03 NOTE — CONSULTS
[facility]OSPITAL[facility]  CONSULT     Zeenat Kevin Patient Status:  Inpatient    1970 MRN [id number]   Location [facility] 6NE-A Attending Cornelio Guzman MD   Hosp Day # 0 PCP REGLA GALLO DO     Reason for consult: Medical management    Requested by:     Subjective:   History of Present Illness:     Zeenat Kevin is a 54 year old female with history of ADHD was initially seen for slurred speech and dysarthria on .  MRI brain showed left hemispheric infarct and CTA head and neck left internal carotid artery occlusive disease consistent with moyamoya.  Patient was started on aspirin and Plavix.    History/Other:    Past Medical History:  Past Medical History:    ADHD (attention deficit hyperactivity disorder)    Allergic rhinitis    Idiopathic hypersomnia    Osteoporosis    Seasonal affective disorder (HCC)    Vitamin D deficiency     Past Surgical History:   Past Surgical History:   Procedure Laterality Date    Ablation      uterus      Family History:   Family History   Problem Relation Age of Onset    Hypertension Mother     Hypertension Brother     Other (stomach cancer) Paternal Aunt      Social History:    reports that she has never smoked. She has never used smokeless tobacco. She reports that she does not currently use alcohol after a past usage of about 0.8 standard drinks of alcohol per week. She reports that she does not use drugs.     Allergies:   Allergies   Allergen Reactions    Ciprofloxacin-Hydrocortisone ITCHING and HIVES     Tingling of hands and feet    Ibuprofen SWELLING    Birch Tar Oil [Betula Alba Oil]      [place]    Modafinil OTHER (SEE COMMENTS)     Pt felt like throat was closing       Medications:    Current Facility-Administered Medications on File Prior to Encounter   Medication Dose Route Frequency Provider Last Rate Last Admin    [] sodium chloride 0.9% infusion   Intravenous Continuous David Rosas MD        [] ondansetron  (Zofran) 4 MG/2ML injection 4 mg  4 mg Intravenous Q4H PRN David Rosas MD        [COMPLETED] iopamidol 76% (ISOVUE-370) injection for power injector  75 mL Intravenous ONCE PRN Daniel Brady, DO   75 mL at 08/28/24 1321    [COMPLETED] clopidogrel (Plavix) tab 75 mg  75 mg Oral Once David Rosas MD   75 mg at 08/28/24 0003    [COMPLETED] gadoterate meglumine (Dotarem) 7.5 MMOL/15ML injection 15 mL  15 mL Intravenous ONCE PRN David Rosas MD   12 mL at 08/27/24 2313     Current Outpatient Medications on File Prior to Encounter   Medication Sig Dispense Refill    cetirizine 10 MG Oral Tab Take 1 tablet (10 mg total) by mouth daily.      estradiol 0.025 MG/24HR Transdermal Patch Biweekly Place 1 patch onto the skin twice a week.      progesterone 100 MG Oral Cap Take 1 capsule (100 mg total) by mouth nightly.      vortioxetine 10 MG Oral Tab Take 1 tablet (10 mg total) by mouth daily.      aspirin 81 MG Oral Chew Tab Chew 1 tablet (81 mg total) by mouth daily. 30 tablet 2    atorvastatin 40 MG Oral Tab Take 1 tablet (40 mg total) by mouth nightly. 30 tablet 2    clopidogrel 75 MG Oral Tab Take 1 tablet (75 mg total) by mouth daily. 30 tablet 2    amLODIPine Besy-Benazepril HCl 5-20 MG Oral Cap Take 1 capsule by mouth daily.      metoprolol succinate ER 25 MG Oral Tablet 24 Hr Take 1 tablet (25 mg total) by mouth daily.      amphetamine-dextroamphetamine 10 MG Oral Tab Take 1 tablet (10 mg total) by mouth 3 (three) times daily as needed.         Review of Systems:   A comprehensive review of systems was completed.    Pertinent positives and negatives noted in the HPI.    Objective:   Physical Exam:    /60 (BP Location: Left arm)   Pulse 58   Temp 97.5 °F (36.4 °C) (Temporal)   Resp 20   Ht 5' 2\" (1.575 m)   Wt 132 lb (59.9 kg)   SpO2 99%   BMI 24.14 kg/m²   General: No acute distress, Alert  Respiratory: No rhonchi, no wheezes  Cardiovascular: S1, S2. Regular rate and rhythm  Abdomen: Soft, NT/ND,  +BS  Neuro: No new focal deficits  Extremities: No edema      Results:    Labs:      Labs Last 24 Hours:  Recent Labs   Lab 08/27/24  1852 09/03/24  0751   WBC 6.9 5.4   HGB 12.1 11.3*   MCV 87.7 88.4   .0 237.0   INR  --  1.00       Recent Labs   Lab 08/27/24  2018 09/03/24  0751   * 135*   BUN 13 11   CREATSERUM 0.77 0.83   CA 10.0 8.7   ALB 4.7  --     141   K 3.8 3.7    110   CO2 28.0 26.0   ALKPHO 107  --    AST 19  --    ALT 15  --    BILT 0.5  --    TP 7.0  --        Recent Labs   Lab 09/03/24  0751   PTP 13.2   INR 1.00       No results for input(s): \"TROP\", \"CK\" in the last 168 hours.      Imaging: Imaging data reviewed in Epic.    Assessment & Plan:      #Acute left frontal lobe CVA  -On aspirin 81 mg daily as well as Lipitor 40 mg at night  -Admitted for cerebral angiogram  Left moyamoya disease  Hypertension  Dyslipidemia  Hypersomnia          Plan of care discussed with patient and RN    Becky Brice MD  9/3/2024    The 21st Century Cures Act makes medical notes like these available to patients in the interest of transparency. Please be advised this is a medical document. Medical documents are intended to carry relevant information, facts as evident, and the clinical opinion of the practitioner. The medical note is intended as peer to peer communication and may appear blunt or direct. It is written in medical language and may contain abbreviations or verbiage that are unfamiliar.

## 2024-09-03 NOTE — PLAN OF CARE
Called to room 0800 for stroke evaluation   Last Known Normal at 0730  Pre-morbid mRS 0    Initial NIHSS 3 including Rt facial droop, mild aphasia, mild dysarthria.  Patient accompanied to CT dept  Repeat NIHSS obtained after imaging completion     NIH Stroke Scale  1a.  Level of consciousness: 0   1b. LOC questions:  0   1c. LOC commands: 0   2.  Best Gaze: 0   3. Visual: 0   4. Facial Palsy: 1   5a. Motor left arm: 0   5b.  Motor right arm: 0   6a. Motor left le   6b.  Motor right le   7. Limb Ataxia: 0   8.  Sensory: 0   9. Best Language:  1   10. Dysarthria: 1   11. Extinction and Inattention: 0     Total:   3          Per Dr Guzman, patient is not a candidate for TNK, Dr. Stinson notified and aware.   Case discussed with Dr Guzman, Sheridan JACOBS, and Dr. Stinson.       Dysphagia screening still needed post dx cerebral angio.     Of note: Per Cath lab nurse, lkw 0730. Per ARLENE Swartz noted pt symptomatic, with right facial droop and mild expressive aphasia, mild dysarthria. SBP was noted to be in the 60's, with pt having a HR in the 30's, Pt was given Atropine and started on Levo at 2, along with a liter bolus.      Stroke Alert timing affected by: Arterial-line insertion    Please refer to the Stroke Data Flowsheets for additional information and Stroke Alert response times.

## 2024-09-04 RX ADMIN — SODIUM CHLORIDE: 9 INJECTION, SOLUTION INTRAVENOUS at 06:41:00

## 2024-09-04 RX ADMIN — ENOXAPARIN SODIUM 40 MG: 100 INJECTION SUBCUTANEOUS at 11:22:00

## 2024-09-04 RX ADMIN — ATORVASTATIN CALCIUM 40 MG: 40 TABLET, FILM COATED ORAL at 20:59:00

## 2024-09-04 RX ADMIN — ASPIRIN 81 MG: 81 TABLET, CHEWABLE ORAL at 09:18:00

## 2024-09-04 RX ADMIN — SODIUM CHLORIDE: 9 INJECTION, SOLUTION INTRAVENOUS at 18:33:00

## 2024-09-04 NOTE — PROGRESS NOTES
[facility]  Neurocritical Care Progress Note     Zeenat Kevin Patient Status:  Inpatient    1970 MRN [id number]   Location [facility] 6NE-A Attending Cornelio Guzman MD   Hosp Day # 1 PCP REGLA GALLO DO     Subjective:   Patient is a 54 year old female h/o adhd and recent L hemispheric ischemic stroke due to moyamoya disease p/w AMS, R facial droop and speech difficulties 9/3.   Hospital course significant for c/o speech difficulties  and w/u revealed mri brain with L frontal AIS and cta with L ica terminus steno-occlusive disease consistent with moyamoya thus started on dapt and decision made to proceed with cerebral angio for further eval.   Prior to angio on 9/3, pt noted to have AMS, R facial droop and slurred speech with SBP 60s and HR 30s thus given IVF and vasopressors with improvement in symptoms and underwent DCA     No acute events overnight.    Vitals:   Temp:  [97 °F (36.1 °C)-97.9 °F (36.6 °C)] 97.4 °F (36.3 °C)  Pulse:  [52-74] 61  Resp:  [11-26] 12  BP: (106-136)/(59-77) 116/77  SpO2:  [95 %-100 %] 96 %  AO: (116-164)/(55-74) 136/63  Body mass index is 24.55 kg/m².    Intake/Output:    Intake/Output Summary (Last 24 hours) at 2024 0834  Last data filed at 2024 0641  Gross per 24 hour   Intake 3029 ml   Output 3750 ml   Net -721 ml     Current Meds:  Current Facility-Administered Medications   Medication Dose Route Frequency    norepinephrine (Levophed) 4 mg/250mL infusion premix  0.5-30 mcg/min Intravenous Continuous    sodium chloride 0.9% infusion   Intravenous Continuous    lactated ringers infusion   Intravenous Continuous    ondansetron (Zofran) 4 MG/2ML injection 4 mg  4 mg Intravenous Q6H PRN    acetaminophen (Tylenol) tab 650 mg  650 mg Oral Q4H PRN    Or    acetaminophen (Tylenol) rectal suppository 650 mg  650 mg Rectal Q4H PRN    morphINE PF 2 MG/ML injection 1 mg  1 mg Intravenous Q2H PRN    Or    morphINE PF 2 MG/ML injection 2 mg  2  mg Intravenous Q2H PRN    ondansetron (Zofran) 4 MG/2ML injection 4 mg  4 mg Intravenous Q6H PRN    prochlorperazine (Compazine) 10 MG/2ML injection 5 mg  5 mg Intravenous Q8H PRN    labetalol (Trandate) 5 mg/mL injection 10 mg  10 mg Intravenous Q10 Min PRN    niCARdipine in sodium chloride 0.86% (carDENE) 20 mg/200mL infusion premix  5-15 mg/hr Intravenous Continuous PRN    aspirin chewable tab 81 mg  81 mg Oral Daily    atorvastatin (Lipitor) tab 40 mg  40 mg Oral Nightly     Physical Examination:   General- No acute distress  CV- RRR  Resp- CTA Bilat  Neuro-  Mental status- awake and alert, regards and follows commands, oriented x3, speech slow but improved compared to yest  Cranial nerves- pupils equally round and reactive to light, extraocular muscles intact, mild R facial droop, visual fields full  Motor- 5/5 throughout  Sens-  Intact to light touch    Diagnostics:   CT STROKE BRAIN (NO IV)(CPT=70450)    Result Date: 9/3/2024  PROCEDURE:  CT STROKE BRAIN (NO IV)(CPT=70450)  COMPARISON:  [facility] , MR, MRI BRAIN (W+WO) (CPT=70553), 8/27/2024, 10:43 PM.  [facility] , CT, CTA BRAIN + CTA CAROTIDS (CPT=70496/77160), 8/28/2024, 11:50 AM.  INDICATIONS:  Lightheaded, prior slurred speech  TECHNIQUE:  Noncontrast CT scanning is performed through the brain.  Dose reduction techniques were used. Dose information is transmitted to the ACR (American College of Radiology) NRDR (National Radiology Data Registry) which includes the Dose Index Registry.  PATIENT STATED HISTORY: (As transcribed by Technologist)  Lightheaded.    FINDINGS: No evidence of hemorrhage or extra-axial fluid collection.  Expected evolution of acute infarct in left frontal lobe is noted.  Previously noted low-density has partially resolved.  Additional low-density regions are stable.  Ventricles and sulci are appropriate for the patient's age.  No mass effect.  Visualized portions of paranasal sinuses are unremarkable.  Visualized portions of mastoid air  cells are unremarkable.  Visualized portions of orbits are unremarkable.  IMPRESSION: Expected evolution of acute infarct in the left frontal lobe.  No hemorrhage or extra-axial fluid collection.  Critical results were called to Nurse Patricia at 900 hours on 9/3/2024.  There was appropriate read back.    LOCATION:  [place]   Dictated by (CST): Jerod Jean MD on 9/03/2024 at 8:53 AM     Finalized by (CST): Jerod Jean MD on 9/03/2024 at 9:01 AM      Lab Review     Recent Labs   Lab 09/03/24  0751 09/04/24  0404    141   K 3.7 3.6    110   CO2 26.0 25.0   * 101*   BUN 11 6*   CREATSERUM 0.83 0.61     Recent Labs   Lab 09/03/24  0751 09/04/24  0404   WBC 5.4 5.2   HGB 11.3* 11.4*   .0 207.0     Assesment/Plan:     Neuro:  Moyamoya disease- c/b L hemispheric ischemia.   Now s/p DCA, plan for possible surgical revascularization per Neurosurg.   Cont neurochecks/pt/ot/st.  Cardiac:  sbp goal 120-160, wean off vasopressors as betzaida.  Pulmonary:  Stable on RA  Renal:  IVFs  GI:  PO as tolerated  Heme/ID:  Afebrile, no leukocytosis  Endocrine/Rheum:  Monitor glucose, sliding scale insulin prn  DVT Prophylaxis:  SCD’s    Goals of the Day: neurochecks   A total of 40 minutes of critical care time (exclusive of billable procedures) was administered to manage and/or prevent neurologic instability. This involved direct patient intervention, complex decision making, and/or extensive discussions with the patient, family, and clinical staff.    Thank you for allowing me to participate in the care of this patient.     Jackson Stinson MD, [facility]  Medical Director of System Neurosciences  Chief, Section of Neurocritical Care  [facility]

## 2024-09-04 NOTE — PROGRESS NOTES
[facility]   part of [facility]     Hospitalist Progress Note     Zeenat Kevin Patient Status:  Inpatient    1970 MRN [id number]   Location [facility] 6NE-A Attending Cornelio Guzman MD   Hosp Day # 1 PCP REGLA GALLO DO     Chief Complaint: elective dx angiogram    Subjective:     Patient with slurred speech    Objective:    Review of Systems:   A comprehensive review of systems was completed; pertinent positive and negatives stated in subjective.    Vital signs:  Temp:  [97 °F (36.1 °C)-97.9 °F (36.6 °C)] 97.4 °F (36.3 °C)  Pulse:  [52-74] 63  Resp:  [11-23] 13  BP: (106-136)/(59-77) 117/72  SpO2:  [95 %-100 %] 97 %  AO: (116-164)/(55-74) 131/59    Physical Exam:    General: No acute distress  Respiratory: No wheezes, no rhonchi  Cardiovascular: S1, S2, regular rate and rhythm  Abdomen: Soft, Non-tender, non-distended, positive bowel sounds  Neuro: No new focal deficits.   Extremities: No edema      Diagnostic Data:    Labs:  Recent Labs   Lab 24  0751 24  0404   WBC 5.4 5.2   HGB 11.3* 11.4*   MCV 88.4 88.9   .0 207.0   INR 1.00  --        Recent Labs   Lab 24  0751 24  0404   * 101*   BUN 11 6*   CREATSERUM 0.83 0.61   CA 8.7 9.1    141   K 3.7 3.6    110   CO2 26.0 25.0       Estimated Creatinine Clearance: 84.4 mL/min (based on SCr of 0.61 mg/dL).    No results for input(s): \"TROP\", \"TROPHS\", \"CK\" in the last 168 hours.    Recent Labs   Lab 24  0751   PTP 13.2   INR 1.00                  Microbiology    No results found for this visit on 24.      Imaging: Reviewed in Epic.    Medications:    enoxaparin  40 mg Subcutaneous Daily    aspirin  81 mg Oral Daily    atorvastatin  40 mg Oral Nightly       Assessment & Plan:      #hx of Acute left frontal lobe CVA  -On aspirin 81 mg daily as well as Lipitor 40 mg at night  -Admitted for  dx cerebral angiogram  Left moyamoya disease  Hypertension  Dyslipidemia  Hypersomnia          Becky Brice MD    Supplementary Documentation:     Quality:  DVT Mechanical Prophylaxis:        DVT Pharmacologic Prophylaxis   Medication    enoxaparin (Lovenox) 40 MG/0.4ML SUBQ injection 40 mg      DVT Pharmacologic prophylaxis: Aspirin 162 mg         Code Status: Not on file  Brown: No urinary catheter in place  Brown Duration (in days):   Central line:    DEON:     Discharge is dependent on: progress  At this point Ms. Kevin is expected to be discharge to: home    The 21st Century Cures Act makes medical notes like these available to patients in the interest of transparency. Please be advised this is a medical document. Medical documents are intended to carry relevant information, facts as evident, and the clinical opinion of the practitioner. The medical note is intended as peer to peer communication and may appear blunt or direct. It is written in medical language and may contain abbreviations or verbiage that are unfamiliar.

## 2024-09-04 NOTE — PLAN OF CARE
Assumed care at 1930.  AOx4. Neuro check Q1.   Right groin site C/D/I.  Keep -160.   Up to the bathroom with minimal assistance.  Plan of care discussed with pt.  Call light within reach.

## 2024-09-04 NOTE — PROGRESS NOTES
[facility]  Neurosurgery Progress Note    Zeenat Kevin Patient Status:  Inpatient    1970 MRN [id number]   Location [facility] 6NE-A Attending Cornelio Guzman MD   Hosp Day # 1 PCP REGLA GALLO DO     Chief Complaint:  Moyamoya    Subjective:  No events overnight. Levo stopped at MN. SBP stable.     Objective/Physical Exam:    Vital Signs:  Blood pressure 117/64, pulse 67, temperature 96.8 °F (36 °C), temperature source Temporal, resp. rate 15, height 62\", weight 134 lb 3.2 oz (60.9 kg), SpO2 98%, not currently breastfeeding.  Respiratory:  nonlabored  CV:  well perfused  General: NAD, Speech Fluent, Mood Appropriate  Neurologic:   A&Ox3  PERRL, EOMi, right facial droop  Full strength x 4, no drift  Skin: No hematoma    Labs:  Recent Labs   Lab 24  0751 24  0404   RBC 3.71* 3.70*   HGB 11.3* 11.4*   HCT 32.8* 32.9*   MCV 88.4 88.9   MCH 30.5 30.8   MCHC 34.5 34.7   RDW 11.9 12.2   NEPRELIM 3.00 3.23   WBC 5.4 5.2   .0 207.0       Recent Labs   Lab 24  0751 24  0404   * 101*   BUN 11 6*   CREATSERUM 0.83 0.61   EGFRCR 84 107   CA 8.7 9.1    141   K 3.7 3.6    110   CO2 26.0 25.0       Imaging:  CT STROKE BRAIN (NO IV)(CPT=70450)    Result Date: 9/3/2024  PROCEDURE:  CT STROKE BRAIN (NO IV)(CPT=70450)  COMPARISON:  [facility] , MR, MRI BRAIN (W+WO) (CPT=70553), 2024, 10:43 PM.  [facility] , CT, CTA BRAIN + CTA CAROTIDS (CPT=70496/12587), 2024, 11:50 AM.  INDICATIONS:  Lightheaded, prior slurred speech  TECHNIQUE:  Noncontrast CT scanning is performed through the brain.  Dose reduction techniques were used. Dose information is transmitted to the ACR (American College of Radiology) NRDR (National Radiology Data Registry) which includes the Dose Index Registry.  PATIENT STATED HISTORY: (As transcribed by Technologist)  Lightheaded.    FINDINGS: No evidence of hemorrhage or extra-axial fluid collection.  Expected evolution of acute  infarct in left frontal lobe is noted.  Previously noted low-density has partially resolved.  Additional low-density regions are stable.  Ventricles and sulci are appropriate for the patient's age.  No mass effect.  Visualized portions of paranasal sinuses are unremarkable.  Visualized portions of mastoid air cells are unremarkable.  Visualized portions of orbits are unremarkable.  IMPRESSION: Expected evolution of acute infarct in the left frontal lobe.  No hemorrhage or extra-axial fluid collection.  Critical results were called to Nurse Patricia at 900 hours on 9/3/2024.  There was appropriate read back.    LOCATION:  [facility]   Dictated by (CST): Jerod Jean MD on 9/03/2024 at 8:53 AM     Finalized by (CST): Jerod Jean MD on 9/03/2024 at 9:01 AM       CTA BRAIN + CTA CAROTIDS (CPT=70496/69681)    Result Date: 8/28/2024  CONCLUSION:   1. There are stable scattered areas of hypoattenuation in the left frontal lobe there are better characterized on the recent MRI of the brain and suspicious for acute to subacute infarcts.  The largest of these measures up to 16 mm.  Clinical correlation  and continued follow-up is suggested.  No evidence of hemorrhagic transformation.  2. 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  There is asymmetric diminutive caliber of the left internal carotid artery branches.  The left middle cerebral artery branches are asymmetrically diminutive when compared to the  right which is also likely on developmental basis.  There is marked hyperplasia of bilateral anterior cerebral artery A1 segments with markedly diminutive and dysplastic anterior cerebral artery branches the remainder of the territory period there are wished of small vessels along the bilateral carotid termini.  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  The overall findings likely fall within the spectrum of moyamoya disease, with other etiologies not entirely excluded.  Clinical  correlation recommended.  3. No evidence of occlusion, dissection, or flow-limiting stenosis in the cervical vertebral or carotid arteries. No evidence of hemodynamically significant carotid stenosis by NASCET criteria.  Please see above for further details.  LOCATION:  [id number]   Dictated by ([facility]): Dickson Collier MD on 8/28/2024 at 12:34 PM     Finalized by (CST): Dickson Collier MD on 8/28/2024 at 12:43 PM       MRI BRAIN (W+WO) (CPT=70553)    Result Date: 8/28/2024  CONCLUSION:  Abnormal brain MRI.  There is a 15 mm focus of restricted diffusion within the posterior left frontal lobe.  There are smaller scattered punctate foci of restriction abnormality within the left central semiovale.  These imaging findings are suspicious for a 15 mm acute infarct within the posterior left frontal lobe with scattered punctate subacute infarcts within the parasagittal left frontal lobe.  These findings are within the watershed territory of the left middle cerebral artery.  A CTA  of the head and neck is recommended for further assessment.  A demyelinating process cannot be entirely excluded and continued clinical and imaging follow-up is recommended for further assessment.   Critical results were discussed with Dr. Rosas at midnight on 8/28/2024. Critical results were read back.   LOCATION:  [facility]    Dictated by ([facility]): Stromberg, LeRoy, MD on 8/27/2024 at 11:55 PM     Finalized by ([facility]): Stromberg, LeRoy, MD on 8/28/2024 at 0:06 AM       CT BRAIN OR HEAD (CPT=70450)    Result Date: 8/27/2024  CONCLUSION:  1. There are multiple foci of decreased attenuation within the left frontal lobe.  Differential considerations include areas of ischemia/infarction, inflammation, neoplastic processes and infection.  MRI without and with gadolinium recommended for further evaluation.  Critical test results were called to Dr. Rosas at 2156 hours on 8/27/2024 with appropriate read back. 2. No acute intracranial hemorrhage.    LOCATION:  [facility]    Dictated by (CST): Silvio Larsen MD on 8/27/2024 at 9:49 PM     Finalized by (CST): Silvio Larsen MD on 8/27/2024 at 9:57 PM           Assessment:  53 yo female s/p cerebral angiogram with moyamoya disease  Episode of AMS    Plan:  -160  Continue aspirin, Plavix on hold  Neuro checks q2  DVT prophylaxis SCDs    Dr. Guzman to follow to discuss plan for revascularization surgery.    Is this a shared or split note between Advanced Practice Provider and Physician? Yes       ARLENE Matias  [facility] [place]  9/4/2024, 12:30 PM  [facility] [id number]

## 2024-09-04 NOTE — PLAN OF CARE
Received from night nurse. Pt neuro checks as ordered. SBP goals 120-160, Pt denies pain throughout the day. +Bowel movement. RT groin, CDI, soft no hematoma,  at bedside and discussed POC.

## 2024-09-04 NOTE — PAYOR COMM NOTE
--------------  ADMISSION REVIEW     Payor: SHELDON GIANG  Subscriber #:  [id number]  Authorization Number: [id number]    Admit date: 9/3/24  Admit time: 11:37 AM       REVIEW DOCUMENTATION:  ED Provider Notes    No notes of this type exist for this encounter.           9/3 H&P      Expand All Collapse All    History & Physical Examination     Patient Name: Zeenat Kevin  MRN: [id number]  [facility]: [id number]  YOB: 1970     Diagnosis: Symptomatic left sided Moyamoya disease     Present Illness: Zeenat Kevin is a 54 year old female who presented 8/27/24 with acute slurred speech and language difficulty.  She denied to me any right sided numbness or weakness.  MRI brain demonstrated a left hemisphere focus of infarct.  CTA head and neck demonstrated left ICA terminus steno-occlusive disease consistent with moyamoya.  She has been started on aspirin and Plavix by neurology.  She has a history of labile blood pressures for which she is on medication.      Has been NPO MN  Does  not take other anticoagulants.  Denies recent illness such as fevers or GI symptoms.  Labs from 8/27/24 reviewed: BMP, CBC, PT/INR all WNL.     In the lab holding area. Patient became diaphoretic with low HR in the 30s and SBP in the 60s. 0.5mg Atropine given with a fluid bolus. HOB was placed flat. SBP increased to 105. Levophed gtt started. Initially, she was not speaking or following commands.     BP and HR improved. Current NIHSS 3 (Right NLF, slurred speech and mild aphasia)  Stroke alert called. LKW 0730.                  9/3 Neurology consult           Reason for Consultation:   Mcclain mcclain     HPI:   Patient is a 54 year old female with a h/o adhd and recent L hemispheric ischemic stroke due to moyamoya disease p/w AMS, R facial droop and speech difficulties 9/3.   Pt c/o speech difficulties 8/27 and w/u revealed mri brain with L frontal AIS and cta with L ica terminus steno-occlusive disease consistent with moyamoya thus started on  dapt and decision made to proceed with cerebral angio for further eval.   Prior to angio on 9/3, pt noted to have AMS, R facial droop and slurred speech with SBP 60s and HR 30s thus given IVF and vasopressors with improvement in symptoms and underwent DCA, and then pt was transferred to [facility] for further monitoring.      Past Medical History       Past Medical History:    ADHD (attention deficit hyperactivity disorder)    Allergic rhinitis    Idiopathic hypersomnia    Osteoporosis    Seasonal affective disorder (HCC)    Vitamin D deficiency                        Current Facility-Administered Medications   Medication Dose Route Frequency    norepinephrine (Levophed) 4 mg/250mL infusion premix  0.5-30 mcg/min Intravenous Continuous    sodium chloride 0.9% infusion   Intravenous Continuous             Facility-Administered Medications Ordered in Other Encounters   Medication Dose Route Frequency    midazolam (Versed) 2 MG/2ML injection   Intravenous PRN    fentaNYL (Sublimaze) 50 mcg/mL injection   Intravenous PRN            Review of Systems:      Constitutional:    Denies unusual weight loss or weight gain, fever/chills or night sweats.  HEENT:                Denies changes in vision or difficulty swallowing.  Pulm:                    Denies dyspnea, cough, or sputum production  Cardiac:               Denies chest pain, palpitations or lower extremity edema.  GI:                         Denies constipation, heartburn or melena.  :                       Denies dysuria or hematuria.  Skin:                     Denies rashes or open areas.  Neuro:                  As per HPI     All other systems were reviewed and are negative.     Vitals:   Temp:  [98 °F (36.7 °C)] 98 °F (36.7 °C)  Pulse:  [42-82] 71  Resp:  [12-26] 14  BP: ()/(46-95) 116/74  SpO2:  [97 %-100 %] 100 %  Body mass index is 24.14 kg/m².     Intake/Output:  No intake or output data in the 24 hours ending 09/03/24 1026     Physical Examination:    General- No acute distress  CV- RRR  Resp- CTA Bilat  Neuro-  Mental status- awake and alert, regards and follows commands, oriented x3, speech slow and hesitant  Cranial nerves- pupils equally round and reactive to light, extraocular muscles intact, mild R facial droop, visual fields full  Motor- 5/5 throughout  Sens-  Intact to light touch     Diagnostics:   CT STROKE BRAIN (NO IV)(CPT=70450)     Result Date: 9/3/2024  PROCEDURE:  CT STROKE BRAIN (NO IV)(CPT=70450)  COMPARISON:  [facility] , MR, MRI BRAIN (W+WO) (CPT=70553), 8/27/2024, 10:43 PM.  [facility] , CT, CTA BRAIN + CTA CAROTIDS (CPT=70496/18044), 8/28/2024, 11:50 AM.  INDICATIONS:  Lightheaded, prior slurred speech  TECHNIQUE:  Noncontrast CT scanning is performed through the brain.  Dose reduction techniques were used. Dose information is transmitted to the ACR (American College of Radiology) NRDR (National Radiology Data Registry) which includes the Dose Index Registry.  PATIENT STATED HISTORY: (As transcribed by Technologist)  Lightheaded.    FINDINGS: No evidence of hemorrhage or extra-axial fluid collection.  Expected evolution of acute infarct in left frontal lobe is noted.  Previously noted low-density has partially resolved.  Additional low-density regions are stable.  Ventricles and sulci are appropriate for the patient's age.  No mass effect.  Visualized portions of paranasal sinuses are unremarkable.  Visualized portions of mastoid air cells are unremarkable.  Visualized portions of orbits are unremarkable.  IMPRESSION: Expected evolution of acute infarct in the left frontal lobe.  No hemorrhage or extra-axial fluid collection.  Critical results were called to Nurse Gomez at 900 hours on 9/3/2024.  There was appropriate read back.    LOCATION:  [facility]   Dictated by (CST): Jerod Jean MD on 9/03/2024 at 8:53 AM     Finalized by (CST): Jerod Jean MD on 9/03/2024 at 9:01 AM         Lab Review           Recent Labs   Lab 08/27/24 2018  24  0751    141   K 3.8 3.7    110   CO2 28.0 26.0   * 135*   BUN 13 11   CREATSERUM 0.77 0.83           Recent Labs   Lab 24  1852 24  0751   WBC 6.9 5.4   HGB 12.1 11.3*   .0 237.0         Assesment/Plan:      Neuro:  Moyamoya disease- c/b L hemispheric ischemia.   Now s/p DCA, plan for possible surgical revascularization per Neurosurg.   Cont neurochecks/pt/ot/st.  Cardiac:  sbp goal 120-160, wean vasopressors as betzaida.  Pulmonary:  Stable on RA  Renal:  IVFs, monitor BUN/Cr  GI:  PO as tolerated  Heme/ID:  Afebrile, no leukocytosis  Endocrine/Rheum:  Monitor glucose, sliding scale insulin prn  DVT Prophylaxis:  SCD’s    Goals of the Day: neurochecks, sbp goals         9/3 IM consult      Reason for consult: Medical management      Subjective:  History of Present Illness:      Zeenat Kevin is a 54 year old female with history of ADHD was initially seen for slurred speech and dysarthria on .  MRI brain showed left hemispheric infarct and CTA head and neck left internal carotid artery occlusive disease consistent with moyamoya.  Patient was started on aspirin and Plavix.         Medications:    Medications Ordered Prior to Encounter             Current Facility-Administered Medications on File Prior to Encounter   Medication Dose Route Frequency Provider Last Rate Last Admin    [] sodium chloride 0.9% infusion   Intravenous Continuous David Rosas MD        [] ondansetron (Zofran) 4 MG/2ML injection 4 mg  4 mg Intravenous Q4H PRN David Rosas MD        [COMPLETED] iopamidol 76% (ISOVUE-370) injection for power injector  75 mL Intravenous ONCE PRN Daniel Brady DO   75 mL at 24 1321    [COMPLETED] clopidogrel (Plavix) tab 75 mg  75 mg Oral Once David Rosas MD   75 mg at 24 0003    [COMPLETED] gadoterate meglumine (Dotarem) 7.5 MMOL/15ML injection 15 mL  15 mL Intravenous ONCE PRN David Rosas MD   12 mL at 24 1859              Current Outpatient Medications on File Prior to Encounter   Medication Sig Dispense Refill    cetirizine 10 MG Oral Tab Take 1 tablet (10 mg total) by mouth daily.        estradiol 0.025 MG/24HR Transdermal Patch Biweekly Place 1 patch onto the skin twice a week.        progesterone 100 MG Oral Cap Take 1 capsule (100 mg total) by mouth nightly.        vortioxetine 10 MG Oral Tab Take 1 tablet (10 mg total) by mouth daily.        aspirin 81 MG Oral Chew Tab Chew 1 tablet (81 mg total) by mouth daily. 30 tablet 2    atorvastatin 40 MG Oral Tab Take 1 tablet (40 mg total) by mouth nightly. 30 tablet 2    clopidogrel 75 MG Oral Tab Take 1 tablet (75 mg total) by mouth daily. 30 tablet 2    amLODIPine Besy-Benazepril HCl 5-20 MG Oral Cap Take 1 capsule by mouth daily.        metoprolol succinate ER 25 MG Oral Tablet 24 Hr Take 1 tablet (25 mg total) by mouth daily.        amphetamine-dextroamphetamine 10 MG Oral Tab Take 1 tablet (10 mg total) by mouth 3 (three) times daily as needed.              Objective:  Physical Exam:    /60 (BP Location: Left arm)   Pulse 58   Temp 97.5 °F (36.4 °C) (Temporal)   Resp 20   Ht 5' 2\" (1.575 m)   Wt 132 lb (59.9 kg)   SpO2 99%   BMI 24.14 kg/m²   General: No acute distress, Alert  Respiratory: No rhonchi, no wheezes  Cardiovascular: S1, S2. Regular rate and rhythm  Abdomen: Soft, NT/ND, +BS  Neuro: No new focal deficits  Extremities: No edema           Results:  Labs:        Labs Last 24 Hours:       Recent Labs   Lab 08/27/24  1852 09/03/24  0751   WBC 6.9 5.4   HGB 12.1 11.3*   MCV 87.7 88.4   .0 237.0   INR  --  1.00              Recent Labs   Lab 08/27/24  2018 09/03/24  0751   * 135*   BUN 13 11   CREATSERUM 0.77 0.83   CA 10.0 8.7   ALB 4.7  --     141   K 3.8 3.7    110   CO2 28.0 26.0   ALKPHO 107  --    AST 19  --    ALT 15  --    BILT 0.5  --    TP 7.0  --              Recent Labs   Lab 09/03/24  0751   PTP 13.2   INR 1.00           Assessment & Plan:  #Acute left frontal lobe CVA  -On aspirin 81 mg daily as well as Lipitor 40 mg at night  -Admitted for cerebral angiogram  Left moyamoya disease  Hypertension  Dyslipidemia  Hypersomnia        9/4 IM    Chief Complaint: elective dx angiogram        Subjective:  Patient with slurred speech           Objective:  Review of Systems:   A comprehensive review of systems was completed; pertinent positive and negatives stated in subjective.     Vital signs:  Temp:  [97 °F (36.1 °C)-97.9 °F (36.6 °C)] 97.4 °F (36.3 °C)  Pulse:  [52-74] 63  Resp:  [11-23] 13  BP: (106-136)/(59-77) 117/72  SpO2:  [95 %-100 %] 97 %  AO: (116-164)/(55-74) 131/59     Physical Exam:    General: No acute distress  Respiratory: No wheezes, no rhonchi  Cardiovascular: S1, S2, regular rate and rhythm  Abdomen: Soft, Non-tender, non-distended, positive bowel sounds  Neuro: No new focal deficits.   Extremities: No edema        Diagnostic Data:    Labs:       Recent Labs   Lab 09/03/24  0751 09/04/24  0404   WBC 5.4 5.2   HGB 11.3* 11.4*   MCV 88.4 88.9   .0 207.0   INR 1.00  --               Recent Labs   Lab 09/03/24  0751 09/04/24  0404   * 101*   BUN 11 6*   CREATSERUM 0.83 0.61   CA 8.7 9.1    141   K 3.7 3.6    110   CO2 26.0 25.0         Estimated Creatinine Clearance: 84.4 mL/min (based on SCr of 0.61 mg/dL).     No results for input(s): \"TROP\", \"TROPHS\", \"CK\" in the last 168 hours.         Recent Labs   Lab 09/03/24  0751   PTP 13.2   INR 1.00                     Microbiology     No results found for this visit on 09/03/24.        Imaging: Reviewed in Epic.     Medications:   Scheduled Medications    enoxaparin  40 mg Subcutaneous Daily    aspirin  81 mg Oral Daily    atorvastatin  40 mg Oral Nightly                  Assessment & Plan:  #hx of Acute left frontal lobe CVA  -On aspirin 81 mg daily as well as Lipitor 40 mg at night  -Admitted for  dx cerebral angiogram  Left moyamoya  disease  Hypertension  Dyslipidemia  Hypersomnia      9/4 Neurology    Subjective:   Patient is a 54 year old female h/o adhd and recent L hemispheric ischemic stroke due to moyamoya disease p/w AMS, R facial droop and speech difficulties 9/3.   Hospital course significant for c/o speech difficulties 8/27 and w/u revealed mri brain with L frontal AIS and cta with L ica terminus steno-occlusive disease consistent with moyamoya thus started on dapt and decision made to proceed with cerebral angio for further eval.   Prior to angio on 9/3, pt noted to have AMS, R facial droop and slurred speech with SBP 60s and HR 30s thus given IVF and vasopressors with improvement in symptoms and underwent DCA      No acute events overnight.     Vitals:   Temp:  [97 °F (36.1 °C)-97.9 °F (36.6 °C)] 97.4 °F (36.3 °C)  Pulse:  [52-74] 61  Resp:  [11-26] 12  BP: (106-136)/(59-77) 116/77  SpO2:  [95 %-100 %] 96 %  AO: (116-164)/(55-74) 136/63  Body mass index is 24.55 kg/m².     Intake/Output:     Intake/Output Summary (Last 24 hours) at 9/4/2024 0834  Last data filed at 9/4/2024 0641      Gross per 24 hour   Intake 3029 ml   Output 3750 ml   Net -721 ml      Current Meds:  Current Hospital Medications          Current Facility-Administered Medications   Medication Dose Route Frequency    norepinephrine (Levophed) 4 mg/250mL infusion premix  0.5-30 mcg/min Intravenous Continuous    sodium chloride 0.9% infusion   Intravenous Continuous    lactated ringers infusion   Intravenous Continuous    ondansetron (Zofran) 4 MG/2ML injection 4 mg  4 mg Intravenous Q6H PRN    acetaminophen (Tylenol) tab 650 mg  650 mg Oral Q4H PRN     Or    acetaminophen (Tylenol) rectal suppository 650 mg  650 mg Rectal Q4H PRN    morphINE PF 2 MG/ML injection 1 mg  1 mg Intravenous Q2H PRN     Or    morphINE PF 2 MG/ML injection 2 mg  2 mg Intravenous Q2H PRN    ondansetron (Zofran) 4 MG/2ML injection 4 mg  4 mg Intravenous Q6H PRN    prochlorperazine (Compazine)  10 MG/2ML injection 5 mg  5 mg Intravenous Q8H PRN    labetalol (Trandate) 5 mg/mL injection 10 mg  10 mg Intravenous Q10 Min PRN    niCARdipine in sodium chloride 0.86% (carDENE) 20 mg/200mL infusion premix  5-15 mg/hr Intravenous Continuous PRN    aspirin chewable tab 81 mg  81 mg Oral Daily    atorvastatin (Lipitor) tab 40 mg  40 mg Oral Nightly         Physical Examination:   General- No acute distress  CV- RRR  Resp- CTA Bilat  Neuro-  Mental status- awake and alert, regards and follows commands, oriented x3, speech slow but improved compared to yest  Cranial nerves- pupils equally round and reactive to light, extraocular muscles intact, mild R facial droop, visual fields full  Motor- 5/5 throughout  Sens-  Intact to light touch             Recent Labs   Lab 09/03/24  0751 09/04/24  0404    141   K 3.7 3.6    110   CO2 26.0 25.0   * 101*   BUN 11 6*   CREATSERUM 0.83 0.61           Recent Labs   Lab 09/03/24  0751 09/04/24  0404   WBC 5.4 5.2   HGB 11.3* 11.4*   .0 207.0      Assesment/Plan:      Neuro:  Moyamoya disease- c/b L hemispheric ischemia.   Now s/p DCA, plan for possible surgical revascularization per Neurosurg.   Cont neurochecks/pt/ot/st.  Cardiac:  sbp goal 120-160, wean off vasopressors as betzaida.  Pulmonary:  Stable on RA  Renal:  IVFs  GI:  PO as tolerated  Heme/ID:  Afebrile, no leukocytosis  Endocrine/Rheum:  Monitor glucose, sliding scale insulin prn  DVT Prophylaxis:  SCD’s    Goals of the Day: neurochecks   MEDICATIONS ADMINISTERED IN LAST 1 DAY:  aspirin chewable tab 81 mg       Date Action Dose Route User    9/4/2024 0918 Given 81 mg Oral Erin He RN          atorvastatin (Lipitor) tab 40 mg       Date Action Dose Route User    9/3/2024 2019 Given 40 mg Oral Trevor Ascencio RN          enoxaparin (Lovenox) 40 MG/0.4ML SUBQ injection 40 mg       Date Action Dose Route User    9/4/2024 1122 Given 40 mg Subcutaneous (Left Lower Abdomen) Berna Wong RN           norepinephrine (Levophed) 4 mg/250mL infusion premix       Date Action Dose Route User    9/3/2024 2200 Rate/Dose Change 1 mcg/min Intravenous Trevor Ascencio, MARIANN          sodium chloride 0.9% infusion       Date Action Dose Route User    9/4/2024 1600 Rate/Dose Verify (none) Intravenous Tindal, Berna L, RN    9/4/2024 1500 Rate/Dose Verify (none) Intravenous Tindal, Berna L, RN    9/4/2024 1400 Rate/Dose Verify (none) Intravenous Tindal, Berna L, RN    9/4/2024 1300 Rate/Dose Verify (none) Intravenous Tindal, Berna L, RN    9/4/2024 1200 Rate/Dose Verify (none) Intravenous Tindal, Berna L, RN    9/4/2024 1157 Rate/Dose Verify (none) Intravenous Tindal, Berna L, RN    9/4/2024 1100 Rate/Dose Verify (none) Intravenous Tindal, Berna L, RN    9/4/2024 1000 Rate/Dose Verify (none) Intravenous Tindal, Berna L, RN    9/4/2024 0900 Rate/Dose Verify (none) Intravenous Tindal, Berna L, RN    9/4/2024 0800 Rate/Dose Verify (none) Intravenous Tindal, Berna L, RN    9/4/2024 0700 Rate/Dose Verify (none) Intravenous Tindal, Berna L, RN    9/4/2024 0641 New Bag (none) Intravenous Trevor Ascencio, MARIANN              norepinephrine (Levophed) 4 mg/250mL infusion premix  Rate: 1.9-112.5 mL/hr Dose: 0.5-30 mcg/min  Freq: Continuous Route: IV  Last Dose: Stopped (09/04/24 0000)  Start: 09/03/24 0815   Admin Instructions:   Target systolic BP greater than or equal to 120; or MAP greater than or equal to 60.  Notify prescriber if maximum dose rate is reached and patient is still not at goal.   Order specific questions:          sodium chloride 0.9 % IV bolus 1,000 mL  Dose: 1,000 mL  Freq: Once Route: IV  Last Dose: Stopped (09/03/24 0835)  Start: 09/03/24 0745 End: 09/03/24 0835 0735 MK-New Bag     0835 MK-Stopped     Followed by    sodium chloride 0.9% infusion  Rate: 83 mL/hr  Freq: Continuous Route: IV  Start: 09/03/24 0922 0835 MK-New Bag     0915 MK-Rate/Dose Verify     0937 WA-Paused [C]     0938 WA-Restarted     0504  WA-Stopped     1205 -Restarted     0641 SC-New Bag     0700 AT-Rate/Dose Verify     0800 AT-Rate/Dose Verify     0900 AT-Rate/Dose Verify     1000 AT-Rate/Dose Verify     1100 AT-Rate/Dose Verify     1157 AT-Rate/Dose Verify     1200 AT-Rate/Dose Verify     1300 AT-Rate/Dose Verify     1400 AT-Rate/Dose Verify     1500 AT-Rate/Dose Verify     1600 AT-Rate/Dose Verify     1700 AT-Rate/Dose Verify     1800 AT-Rate/Dose Verify     1833 AT-New Bag     2000 SH-Rate/Dose Verify     0000 SH-Rate/Dose Verify     0400 SH-Rate/Dose Verify     0600 AT-Rate/Dose Verify     0700 AT-Rate/Dose Verify     Legend:       [id number]/3109/0109/0209/0309/0409/05            0742 MK-New Bag     0915 MK-Rate/Dose Change     0930 MK-Rate/Dose Change     0938 WA-Continued by Anesthesia     1009 WA-Rate/Dose Change     1121 WA-Stopped     1205 JH-Restarted     1511 JH-Rate/Dose Change     2200 SC-Rate/Dose Change      0000 SC-Stopped                      Vitals (last day)       Date/Time Temp Pulse Resp BP SpO2 Weight O2 Device O2 Flow Rate (L/min) [facility]    09/04/24 1600 98.5 °F (36.9 °C) 71 13 123/68 98 % -- None (Room air) -- AT    09/04/24 1500 -- 71 19 127/66 95 % -- None (Room air) -- AT    09/04/24 1400 -- 80 18 124/68 96 % -- -- --     09/04/24 1400 -- -- -- -- -- -- None (Room air) -- AT    09/04/24 1330 -- 60 13 -- 97 % -- -- --     09/04/24 1300 -- 63 14 127/70 99 % -- None (Room air) -- AT    09/04/24 1200 96.8 °F (36 °C) 67 15 117/64 98 % -- None (Room air) -- AT    09/04/24 1100 -- 65 16 120/61 97 % -- None (Room air) -- AT    09/04/24 1000 -- 64 25 116/75 98 % -- None (Room air) -- AT    09/04/24 0900 97.4 °F (36.3 °C) -- -- 117/72 -- -- -- -- SG    09/04/24 0900 -- 63 13 -- 97 % -- None (Room air) -- AT    09/04/24 0800 -- 61 12 116/77 96 % -- None (Room air) -- AT    09/04/24 0700 -- 65 15 121/73 97 % -- None (Room air) -- SC    09/04/24 0600 -- 64 12 107/66 97 % 134 lb 3.2 oz (60.9 kg) None (Room  air) -- SC    09/04/24 0500 -- 64 13 112/67 97 % -- None (Room air) -- SC    09/04/24 0400 97.4 °F (36.3 °C) 62 14 118/67 97 % -- None (Room air) -- SC    09/04/24 0300 -- 62 14 114/70 97 % -- None (Room air) -- SC    09/04/24 0200 -- 63 16 112/69 95 % -- None (Room air) -- SC    09/04/24 0100 -- 63 14 118/69 96 % -- None (Room air) -- SC    09/04/24 0000 97 °F (36.1 °C) 60 23 125/74 96 % -- None (Room air) -- SC    09/03/24 2300 -- 59 14 125/75 97 % -- None (Room air) -- SC    09/03/24 2200 -- 62 15 114/68 97 % -- None (Room air) -- SC    09/03/24 2100 -- 62 13 134/75 98 % -- None (Room air) -- SC    09/03/24 2000 97.6 °F (36.4 °C) 62 17 114/67 96 % -- None (Room air) -- SC    09/03/24 1900 -- 57 15 131/74 98 % -- None (Room air) --     09/03/24 1800 -- 56 15 117/59 98 % -- None (Room air) --     09/03/24 1700 -- 60 16 109/62 98 % -- None (Room air) --     09/03/24 1600 97.9 °F (36.6 °C) 58 14 111/66 97 % -- None (Room air) --     09/03/24 1500 -- 56 13 128/70 99 % -- None (Room air) --     09/03/24 1445 -- 60 11 -- 99 % -- -- --     09/03/24 1415 -- 53 21 -- 99 % -- -- --     09/03/24 1400 -- 57 23 125/61 100 % -- None (Room air) --     09/03/24 1345 -- 66 15 -- 99 % -- -- --     09/03/24 1315 -- 54 14 -- 98 % -- -- --     09/03/24 1300 -- 54 13 122/69 98 % -- None (Room air) --     09/03/24 1245 -- 53 18 -- 99 % -- -- --     09/03/24 1230 -- 53 19 111/68 99 % -- -- --     09/03/24 1215 -- 52 17 -- 100 % -- -- --     09/03/24 1200 97.8 °F (36.6 °C) 58 20 106/60 99 % -- None (Room air) --     09/03/24 1145 -- 74 23 136/76 100 % -- -- --     09/03/24 1140 97.5 °F (36.4 °C) 71 17 128/70 100 % -- None (Room air) --     09/03/24 0930 -- 71 14 116/74 100 % -- -- --     09/03/24 0915 -- 64 17 118/68 99 % -- -- --     09/03/24 0900 -- 63 18 124/74 99 % -- None (Room air) --     09/03/24 0845 -- 70 26 128/74 99 % -- -- --     09/03/24 0830 -- 70 18 123/77 100 % -- -- --      09/03/24 0815 -- 72 17 140/79 100 % -- -- --     09/03/24 0810 -- 71 18 131/75 100 % -- -- --     09/03/24 0805 -- 78 21 129/83 100 % -- -- --     09/03/24 0803 -- 72 23 130/79 99 % -- -- --     09/03/24 0800 -- 72 19 130/78 100 % -- -- --     09/03/24 0757 -- 72 18 132/79 99 % -- -- --     09/03/24 0754 -- 69 18 135/87 99 % -- -- --     09/03/24 0751 -- 72 25 119/95 98 % -- -- --     09/03/24 0748 -- 82 17 109/69 98 % -- -- --     09/03/24 0745 -- 73 22 105/60 99 % -- -- --     09/03/24 0742 -- 68 18 94/60 98 % -- -- --     09/03/24 0740 -- 64 15 91/59 98 % -- -- --     09/03/24 0739 -- 42 14 77/52 97 % -- None (Room air) --     09/03/24 0737 -- 57 12 65/46 -- -- -- --     09/03/24 0721 98 °F (36.7 °C) 68 16 116/70 99 % -- None (Room air) --

## 2024-09-05 ENCOUNTER — TELEPHONE (OUTPATIENT)
Dept: SURGERY | Facility: CLINIC | Age: 54
End: 2024-09-05

## 2024-09-05 DIAGNOSIS — I63.89 CEREBROVASCULAR ACCIDENT (CVA) DUE TO OTHER MECHANISM (HCC): ICD-10-CM

## 2024-09-05 DIAGNOSIS — I67.5 MOYAMOYA DISEASE: Primary | ICD-10-CM

## 2024-09-05 RX ADMIN — ENOXAPARIN SODIUM 40 MG: 100 INJECTION SUBCUTANEOUS at 08:09:00

## 2024-09-05 RX ADMIN — ASPIRIN 81 MG: 81 TABLET, CHEWABLE ORAL at 08:08:00

## 2024-09-05 NOTE — PROGRESS NOTES
Assumed care of patient at 1930. A/Ox4 with neuro checks q2hr. Intact, following commands, equal strength. NSR on tele, room air. See flowsheet for detailed assessment. SBP goals 120-160, use Juanita for reading. R groin site clean, dry, intact with no hematoma present. Pt ambulates to bathroom with minimal assistance.      POC: possible discharge to home 9/5. Follow up revascularization in future

## 2024-09-05 NOTE — SLP NOTE
Met with patient at bedside. She notes dysarthria since stroke last month. She is noted to exhibit articulatory breakdowns intermittently mostly with multisyllabic words though she demonstrates appropriate error awareness and is able to revise as appropriate. She reports she has OP SLP scheduled for later this month. Recommend she follow up with OP SLP as planned and continue to implement compensatory articulation techniques. Patient in agreement.     Antonio Miles MS CCC-SLP  Pager [phone]

## 2024-09-05 NOTE — PROGRESS NOTES
[facility]   part of [facility]     Hospitalist Progress Note     Zeenat Kevin Patient Status:  Inpatient    1970 MRN [id number]   Location [facility] 6NE-A Attending Cornelio Guzman MD   Hosp Day # 2 PCP REGLA GALLO DO     Chief Complaint: elective dx angiogram    Subjective:     Patient with slurred speech    Objective:    Review of Systems:   A comprehensive review of systems was completed; pertinent positive and negatives stated in subjective.    Vital signs:  Temp:  [96.8 °F (36 °C)-98.5 °F (36.9 °C)] 96.9 °F (36.1 °C)  Pulse:  [56-80] 60  Resp:  [11-25] 13  BP: ()/(61-94) 118/73  SpO2:  [95 %-100 %] 98 %  AO: (124-161)/(59-79) 155/76    Physical Exam:    General: No acute distress  Respiratory: No wheezes, no rhonchi  Cardiovascular: S1, S2, regular rate and rhythm  Abdomen: Soft, Non-tender, non-distended, positive bowel sounds  Neuro: No new focal deficits.   Extremities: No edema      Diagnostic Data:    Labs:  Recent Labs   Lab 24  0751 24  0404   WBC 5.4 5.2   HGB 11.3* 11.4*   MCV 88.4 88.9   .0 207.0   INR 1.00  --        Recent Labs   Lab 24  0751 24  0404   * 101*   BUN 11 6*   CREATSERUM 0.83 0.61   CA 8.7 9.1    141   K 3.7 3.6    110   CO2 26.0 25.0       Estimated Creatinine Clearance: 84.4 mL/min (based on SCr of 0.61 mg/dL).    No results for input(s): \"TROP\", \"TROPHS\", \"CK\" in the last 168 hours.    Recent Labs   Lab 24  0751   PTP 13.2   INR 1.00                  Microbiology    No results found for this visit on 24.      Imaging: Reviewed in Epic.    Medications:    enoxaparin  40 mg Subcutaneous Daily    [Held by provider] midodrine  5 mg Oral TID    aspirin  81 mg Oral Daily    atorvastatin  40 mg Oral Nightly       Assessment & Plan:      #hx of Acute left frontal lobe CVA  -On aspirin 81 mg daily as well as Lipitor 40 mg at night  -Admitted for  dx cerebral angiogram  Left moyamoya  disease  Hypertension  Dyslipidemia  Hypersomnia         Becky Brice MD    Supplementary Documentation:     Quality:  DVT Mechanical Prophylaxis:        DVT Pharmacologic Prophylaxis   Medication    enoxaparin (Lovenox) 40 MG/0.4ML SUBQ injection 40 mg      DVT Pharmacologic prophylaxis: Aspirin 162 mg         Code Status: Not on file  Brown: No urinary catheter in place  Brown Duration (in days):   Central line:    DEON:     Discharge is dependent on: progress  At this point Ms. Kevin is expected to be discharge to: home    The 21st Century Cures Act makes medical notes like these available to patients in the interest of transparency. Please be advised this is a medical document. Medical documents are intended to carry relevant information, facts as evident, and the clinical opinion of the practitioner. The medical note is intended as peer to peer communication and may appear blunt or direct. It is written in medical language and may contain abbreviations or verbiage that are unfamiliar.

## 2024-09-05 NOTE — PLAN OF CARE
Received patient report from off going RN, Neuro Intact. MD's rounded on patient and ok to discharge home.  at bedside. POC discussed. Discharge paperwork reviewed. Questions answered. Care relinquished.

## 2024-09-05 NOTE — PROGRESS NOTES
[facility]  Neurocritical Care Progress Note     Zeenat Kevin Patient Status:  Inpatient    1970 MRN [id number]   Location [facility] 6NE-A Attending Cornelio Guzman MD   Hosp Day # 2 PCP REGLA GALLO DO     Subjective:   Patient is a 54 year old female h/o adhd and recent L hemispheric ischemic stroke due to moyamoya disease p/w AMS, R facial droop and speech difficulties 9/3.   Hospital course significant for c/o speech difficulties  and w/u revealed mri brain with L frontal AIS and cta with L ica terminus steno-occlusive disease consistent with moyamoya thus started on dapt and decision made to proceed with cerebral angio for further eval.   Prior to angio on 9/3, pt noted to have AMS, R facial droop and slurred speech with SBP 60s and HR 30s thus given IVF and vasopressors with improvement in symptoms and underwent DCA     No acute events overnight.    Vitals:   Temp:  [96.8 °F (36 °C)-98.5 °F (36.9 °C)] 98.2 °F (36.8 °C)  Pulse:  [56-80] 68  Resp:  [11-25] 19  BP: ()/(61-94) 142/80  SpO2:  [95 %-100 %] 99 %  AO: (124-161)/(59-79) 144/69  Body mass index is 24.55 kg/m².    Intake/Output:    Intake/Output Summary (Last 24 hours) at 2024 0837  Last data filed at 2024 0700  Gross per 24 hour   Intake 3135 ml   Output 3750 ml   Net -615 ml     Current Meds:  Current Facility-Administered Medications   Medication Dose Route Frequency    enoxaparin (Lovenox) 40 MG/0.4ML SUBQ injection 40 mg  40 mg Subcutaneous Daily    butalbital-acetaminophen-caffeine (Fioricet) -40 MG per tab 1 tablet  1 tablet Oral Q4H PRN    [Held by provider] midodrine (ProAmatine) tab 5 mg  5 mg Oral TID    norepinephrine (Levophed) 4 mg/250mL infusion premix  0.5-30 mcg/min Intravenous Continuous    sodium chloride 0.9% infusion   Intravenous Continuous    lactated ringers infusion   Intravenous Continuous    ondansetron (Zofran) 4 MG/2ML injection 4 mg  4 mg Intravenous Q6H PRN     acetaminophen (Tylenol) tab 650 mg  650 mg Oral Q4H PRN    Or    acetaminophen (Tylenol) rectal suppository 650 mg  650 mg Rectal Q4H PRN    morphINE PF 2 MG/ML injection 1 mg  1 mg Intravenous Q2H PRN    Or    morphINE PF 2 MG/ML injection 2 mg  2 mg Intravenous Q2H PRN    ondansetron (Zofran) 4 MG/2ML injection 4 mg  4 mg Intravenous Q6H PRN    prochlorperazine (Compazine) 10 MG/2ML injection 5 mg  5 mg Intravenous Q8H PRN    labetalol (Trandate) 5 mg/mL injection 10 mg  10 mg Intravenous Q10 Min PRN    niCARdipine in sodium chloride 0.86% (carDENE) 20 mg/200mL infusion premix  5-15 mg/hr Intravenous Continuous PRN    aspirin chewable tab 81 mg  81 mg Oral Daily    atorvastatin (Lipitor) tab 40 mg  40 mg Oral Nightly     Physical Examination:   General- No acute distress  CV- RRR  Resp- CTA Bilat  Neuro-  Mental status- awake and alert, regards and follows commands, oriented x3, speech slow and hesitatnt  Cranial nerves- pupils equally round and reactive to light, extraocular muscles intact, mild R facial droop, visual fields full  Motor- 5/5 throughout  Sens-  Intact to light touch    Diagnostics:   No results found.  Lab Review     Recent Labs   Lab 09/03/24  0751 09/04/24  0404    141   K 3.7 3.6    110   CO2 26.0 25.0   * 101*   BUN 11 6*   CREATSERUM 0.83 0.61     Recent Labs   Lab 09/03/24  0751 09/04/24  0404   WBC 5.4 5.2   HGB 11.3* 11.4*   .0 207.0     Assesment/Plan:     Neuro:  Moyamoya disease- c/b L hemispheric ischemia.   Now s/p DCA, plan for possible surgical revascularization on future hospitalization per Neurosurg.   Cont neurochecks/pt/ot/st.  No further neurocritical care needs at this time, further management and outpt follow-up per Neurosurg, will follow peripherally please call with any questions.   Cardiac:  sbp goal 120-160, maintaining well off vasopressors. Cont to hold all anti-htn meds until after surgical revascularization.   Pulmonary:  Stable on  RA  Renal:  IVFs  GI:  PO as tolerated  Heme/ID:  Afebrile, no leukocytosis  Endocrine/Rheum:  Monitor glucose, sliding scale insulin prn  DVT Prophylaxis:  SCD’s    Goals of the Day: neurochecks   A total of 40 minutes of critical care time (exclusive of billable procedures) was administered to manage and/or prevent neurologic instability. This involved direct patient intervention, complex decision making, and/or extensive discussions with the patient, family, and clinical staff.    Thank you for allowing me to participate in the care of this patient.     Jackson Stinson MD, [facility]  Medical Director of System Neurosciences  Chief, Section of Neurocritical Care  [facility]

## 2024-09-05 NOTE — PROGRESS NOTES
[facility]  Neurosurgery Progress Note    Zeenat Kevin Patient Status:  Inpatient    1970 MRN [id number]   Location [facility] 6NE-A Attending Cornelio Guzman MD   Hosp Day # 2 PCP REGLA GALLO DO     Chief Complaint:  Moyamoya    Subjective:  No events overnight. BP stable without needing pressors or Florineff    Objective/Physical Exam:    Vital Signs:  Blood pressure 139/69, pulse 64, temperature 98.2 °F (36.8 °C), temperature source Temporal, resp. rate 18, height 62\", weight 134 lb 3.2 oz (60.9 kg), SpO2 98%, not currently breastfeeding.  Respiratory:  nonlabored  CV:  well perfused  General: NAD, Speech Fluent, Mood Appropriate  Neurologic:   A&Ox3  PERRL, EOMi, right facial droop  Full strength x 4, no drift      Labs:  Recent Labs   Lab 24  0751 24  0404   RBC 3.71* 3.70*   HGB 11.3* 11.4*   HCT 32.8* 32.9*   MCV 88.4 88.9   MCH 30.5 30.8   MCHC 34.5 34.7   RDW 11.9 12.2   NEPRELIM 3.00 3.23   WBC 5.4 5.2   .0 207.0       Recent Labs   Lab 24  0751 24  0404   * 101*   BUN 11 6*   CREATSERUM 0.83 0.61   EGFRCR 84 107   CA 8.7 9.1    141   K 3.7 3.6    110   CO2 26.0 25.0       Imaging:  CT STROKE BRAIN (NO IV)(CPT=70450)    Result Date: 9/3/2024  PROCEDURE:  CT STROKE BRAIN (NO IV)(CPT=70450)  COMPARISON:  [facility] , MR, MRI BRAIN (W+WO) (CPT=70553), 2024, 10:43 PM.  [facility] , CT, CTA BRAIN + CTA CAROTIDS (CPT=70496/67535), 2024, 11:50 AM.  INDICATIONS:  Lightheaded, prior slurred speech  TECHNIQUE:  Noncontrast CT scanning is performed through the brain.  Dose reduction techniques were used. Dose information is transmitted to the ACR (American College of Radiology) NRDR (National Radiology Data Registry) which includes the Dose Index Registry.  PATIENT STATED HISTORY: (As transcribed by Technologist)  Lightheaded.    FINDINGS: No evidence of hemorrhage or extra-axial fluid collection.  Expected evolution of acute  infarct in left frontal lobe is noted.  Previously noted low-density has partially resolved.  Additional low-density regions are stable.  Ventricles and sulci are appropriate for the patient's age.  No mass effect.  Visualized portions of paranasal sinuses are unremarkable.  Visualized portions of mastoid air cells are unremarkable.  Visualized portions of orbits are unremarkable.  IMPRESSION: Expected evolution of acute infarct in the left frontal lobe.  No hemorrhage or extra-axial fluid collection.  Critical results were called to Nurse Patricia at 900 hours on 9/3/2024.  There was appropriate read back.    LOCATION:  [facility]   Dictated by (CST): Jerod Jean MD on 9/03/2024 at 8:53 AM     Finalized by (CST): Jerod Jean MD on 9/03/2024 at 9:01 AM       CTA BRAIN + CTA CAROTIDS (CPT=70496/57007)    Result Date: 8/28/2024  CONCLUSION:   1. There are stable scattered areas of hypoattenuation in the left frontal lobe there are better characterized on the recent MRI of the brain and suspicious for acute to subacute infarcts.  The largest of these measures up to 16 mm.  Clinical correlation  and continued follow-up is suggested.  No evidence of hemorrhagic transformation.  2. 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  There is asymmetric diminutive caliber of the left internal carotid artery branches.  The left middle cerebral artery branches are asymmetrically diminutive when compared to the  right which is also likely on developmental basis.  There is marked hyperplasia of bilateral anterior cerebral artery A1 segments with markedly diminutive and dysplastic anterior cerebral artery branches the remainder of the territory period there are wished of small vessels along the bilateral carotid termini.  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  The overall findings likely fall within the spectrum of moyamoya disease, with other etiologies not entirely excluded.  Clinical  correlation recommended.  3. No evidence of occlusion, dissection, or flow-limiting stenosis in the cervical vertebral or carotid arteries. No evidence of hemodynamically significant carotid stenosis by NASCET criteria.  Please see above for further details.  LOCATION:  [id number]   Dictated by ([facility]): Dickson Collier MD on 8/28/2024 at 12:34 PM     Finalized by (CST): Dickson Collier MD on 8/28/2024 at 12:43 PM       MRI BRAIN (W+WO) (CPT=70553)    Result Date: 8/28/2024  CONCLUSION:  Abnormal brain MRI.  There is a 15 mm focus of restricted diffusion within the posterior left frontal lobe.  There are smaller scattered punctate foci of restriction abnormality within the left central semiovale.  These imaging findings are suspicious for a 15 mm acute infarct within the posterior left frontal lobe with scattered punctate subacute infarcts within the parasagittal left frontal lobe.  These findings are within the watershed territory of the left middle cerebral artery.  A CTA  of the head and neck is recommended for further assessment.  A demyelinating process cannot be entirely excluded and continued clinical and imaging follow-up is recommended for further assessment.   Critical results were discussed with Dr. Rosas at midnight on 8/28/2024. Critical results were read back.   LOCATION:  [facility]    Dictated by ([facility]): Stromberg, LeRoy, MD on 8/27/2024 at 11:55 PM     Finalized by ([facility]): Stromberg, LeRoy, MD on 8/28/2024 at 0:06 AM       CT BRAIN OR HEAD (CPT=70450)    Result Date: 8/27/2024  CONCLUSION:  1. There are multiple foci of decreased attenuation within the left frontal lobe.  Differential considerations include areas of ischemia/infarction, inflammation, neoplastic processes and infection.  MRI without and with gadolinium recommended for further evaluation.  Critical test results were called to Dr. Rosas at 2156 hours on 8/27/2024 with appropriate read back. 2. No acute intracranial hemorrhage.    LOCATION:  [facility]    Dictated by (CST): Silvio Larsen MD on 8/27/2024 at 9:49 PM     Finalized by (CST): Silvio Larsen MD on 8/27/2024 at 9:57 PM           Assessment:  53 yo female s/p cerebral angiogram with moyamoya disease  Episode of AMS with speech difficulty    Plan:  Normotensive  Continue aspirin 325mg daily, hold Plavix  OK to DC when cleared by other services.  Will follow up with plan for surgery    Is this a shared or split note between Advanced Practice Provider and Physician? ARLENE Emanuel  [facility]  Spectre [id number]

## 2024-09-06 NOTE — DISCHARGE SUMMARY
[facility]  DISCHARGE SUMMARY    Zeenat Kevin Patient Status:  Inpatient    1970 MRN [id number]   Location [facility] 6NE-A Attending No att. providers found   Hosp Day # 2 PCP REGLA GALLO DO     Date of Admission: 9/3/2024    Date of Discharge: 24      ADMISSION DIAGNOSIS:   Moyamoya [I67.5]      DISCHARGE DIAGNOSIS:   Patient Active Problem List   Diagnosis    Vitamin D deficiency    Primary hypertension    Idiopathic hypersomnia    Hyperglycemia    Speech disturbance, unspecified type    Acute CVA (cerebrovascular accident) (HCC)    Moyamoya    * No post-op diagnosis entered *      REASON FOR ADMISSION: Planned surgical procedure      PROCEDURES:  Cerebral angiogram      HISTORY OF PRESENT ILLNESS:   Zeenat Kevin is a 54 year old female who presented 24 with acute slurred speech and language difficulty.  She denied to me any right sided numbness or weakness.  MRI brain demonstrated a left hemisphere focus of infarct.  CTA head and neck demonstrated left ICA terminus steno-occlusive disease consistent with moyamoya.  She has been started on aspirin and Plavix by neurology.  She has a history of labile blood pressures for which she is on medication.       HOSPITAL COURSE:   In the lab holding area prior to cerebral angiogram. Patient became diaphoretic with low HR in the 30s and SBP in the 60s. 0.5mg Atropine given with a fluid bolus. HOB was placed flat. SBP increased to 105. Levophed gtt started. Initially, she was not speaking or following commands.     BP and HR improved. Current NIHSS 3 (Right NLF, slurred speech and mild aphasia)  Stroke alert called. LKW 0730. CT head was performed which was negative for hemorrhage. Once stabilized, the cerebral angiogram was performed identifying left sided Moyamoya syndrome.She was admitted to the Neuro intensive care unit for hemodynamic monitoring with SBP goals of 120-160. The following day, the Levophed gtt was stopped after midnight  and patient was able to maintain her blood pressure.     Patient demonstrated resolution/improvement of pre-operative symptoms, as well as increased mobility and adequate pain control during the hospital course.  Patient was evaluated by ancillary services and was recommended to discharge to home. Patient was cleared for discharge by all disciplines on 9/5/24, with follow-up appointments scheduled.         COMPLICATIONS: None      DISCHARGE CONDITION: Good      DISPOSITION: Home      DISCHARGE MEDICATIONS:   Discharge Medication List as of 9/5/2024 12:47 PM        START taking these medications    Details   aspirin 325 MG Oral Tab Take 1 tablet (325 mg total) by mouth daily., Normal, Disp-30 tablet, R-5           CONTINUE these medications which have NOT CHANGED    Details   estradiol 0.025 MG/24HR Transdermal Patch Biweekly Place 1 patch onto the skin twice a week., Historical      progesterone 100 MG Oral Cap Take 1 capsule (100 mg total) by mouth nightly., Historical      vortioxetine 10 MG Oral Tab Take 1 tablet (10 mg total) by mouth daily., Historical      atorvastatin 40 MG Oral Tab Take 1 tablet (40 mg total) by mouth nightly., Normal, Disp-30 tablet, R-2      amphetamine-dextroamphetamine 10 MG Oral Tab Take 1 tablet (10 mg total) by mouth 3 (three) times daily as needed., Historical           STOP taking these medications       cetirizine 10 MG Oral Tab        aspirin 81 MG Oral Chew Tab        clopidogrel 75 MG Oral Tab        amLODIPine Besy-Benazepril HCl 5-20 MG Oral Cap        metoprolol succinate ER 25 MG Oral Tablet 24 Hr              FOLLOW UP VISITS:   Future Appointments   Date Time Provider Department Center   9/16/2024  5:15 PM Maryjo Hedrick SLP [facility]   9/23/2024  5:15 PM Maryjo Hedrick SLP [facility]   9/30/2024  5:15 PM Maryjo Hedrick SLP [facility]   10/7/2024  3:45 PM Maryjo Hedrick [facility]   10/14/2024  3:45 PM Ryley  Maryjo, SLP [facility]   10/21/2024  3:45 PM Maryjo Hedrick [facility]   10/28/2024  5:15 PM Maryjo Hedrick [facility]   11/4/2024  3:45 PM Maryjo Hedrick [facility]   11/11/2024  3:45 PM Maryjo Hedrick [facility]         PATIENT INSTRUCTIONS:   The patient was educated on procedure site care including monitoring for any signs of infection, avoiding heavy lifting, avoiding driving for the first 24 hours after the procedure.  They were encouraged to call or message our office with any questions or concerns.  They were instructed to go to the emergency room with any signs or symptoms of stroke or any uncontrolled bleeding.  All questions were answered and the patient verbalized understanding.         ARLENE Matias  [facility] [place]  9/5/2024 7:02 PM

## 2024-09-06 NOTE — PAYOR COMM NOTE
--------------  DISCHARGE REVIEW    Payor: [facility]  Subscriber #:  [id number]  Authorization Number: [id number]    Admit date: 9/3/24  Admit time:  11:37 AM  Discharge Date: 2024 12:56 PM     Admitting Physician: Cornelio Guzman MD  Attending Physician:  No att. providers found  Primary Care Physician: Regla Myrick          Discharge Summary Notes        Discharge Summary signed by Sheridan Anand APRN at 2024  7:07 PM       Author: Sheridan Anand APRN Specialty: Nurse Practitioner, NEUROSURGERY Author Type: Nurse Practitioner    Filed: 2024  7:07 PM Date of Service: 2024  7:01 PM Status: Signed    : Sheridan Anand APRN (Nurse Practitioner)         [facility]  DISCHARGE SUMMARY    Zeenat Kevin Patient Status:  Inpatient    1970 MRN [id number]   Location [facility] 6NE-A Attending No att. providers found   Hosp Day # 2 PCP REGLA GALLO DO     Date of Admission: 9/3/2024    Date of Discharge: 24      ADMISSION DIAGNOSIS:   Moyamoya [I67.5]      DISCHARGE DIAGNOSIS:   Patient Active Problem List   Diagnosis    Vitamin D deficiency    Primary hypertension    Idiopathic hypersomnia    Hyperglycemia    Speech disturbance, unspecified type    Acute CVA (cerebrovascular accident) (HCC)    Moyamoya    * No post-op diagnosis entered *      REASON FOR ADMISSION: Planned surgical procedure      PROCEDURES:  Cerebral angiogram      HISTORY OF PRESENT ILLNESS:   Zeenat Kevin is a 54 year old female who presented 24 with acute slurred speech and language difficulty.  She denied to me any right sided numbness or weakness.  MRI brain demonstrated a left hemisphere focus of infarct.  CTA head and neck demonstrated left ICA terminus steno-occlusive disease consistent with moyamoya.  She has been started on aspirin and Plavix by neurology.  She has a history of labile blood pressures for which she is on medication.       HOSPITAL COURSE:   In the lab holding  area prior to cerebral angiogram. Patient became diaphoretic with low HR in the 30s and SBP in the 60s. 0.5mg Atropine given with a fluid bolus. HOB was placed flat. SBP increased to 105. Levophed gtt started. Initially, she was not speaking or following commands.     BP and HR improved. Current NIHSS 3 (Right NLF, slurred speech and mild aphasia)  Stroke alert called. LKW 0730. CT head was performed which was negative for hemorrhage. Once stabilized, the cerebral angiogram was performed identifying left sided Moyamoya syndrome.She was admitted to the Neuro intensive care unit for hemodynamic monitoring with SBP goals of 120-160. The following day, the Levophed gtt was stopped after midnight and patient was able to maintain her blood pressure.     Patient demonstrated resolution/improvement of pre-operative symptoms, as well as increased mobility and adequate pain control during the hospital course.  Patient was evaluated by ancillary services and was recommended to discharge to home. Patient was cleared for discharge by all disciplines on 9/5/24, with follow-up appointments scheduled.         COMPLICATIONS: None      DISCHARGE CONDITION: Good      DISPOSITION: Home      DISCHARGE MEDICATIONS:   Discharge Medication List as of 9/5/2024 12:47 PM        START taking these medications    Details   aspirin 325 MG Oral Tab Take 1 tablet (325 mg total) by mouth daily., Normal, Disp-30 tablet, R-5           CONTINUE these medications which have NOT CHANGED    Details   estradiol 0.025 MG/24HR Transdermal Patch Biweekly Place 1 patch onto the skin twice a week., Historical      progesterone 100 MG Oral Cap Take 1 capsule (100 mg total) by mouth nightly., Historical      vortioxetine 10 MG Oral Tab Take 1 tablet (10 mg total) by mouth daily., Historical      atorvastatin 40 MG Oral Tab Take 1 tablet (40 mg total) by mouth nightly., Normal, Disp-30 tablet, R-2      amphetamine-dextroamphetamine 10 MG Oral Tab Take 1 tablet  (10 mg total) by mouth 3 (three) times daily as needed., Historical           STOP taking these medications       cetirizine 10 MG Oral Tab        aspirin 81 MG Oral Chew Tab        clopidogrel 75 MG Oral Tab        amLODIPine Besy-Benazepril HCl 5-20 MG Oral Cap        metoprolol succinate ER 25 MG Oral Tablet 24 Hr              FOLLOW UP VISITS:   Future Appointments   Date Time Provider Department Center   9/16/2024  5:15 PM Maryjo Hedrick [facility]   9/23/2024  5:15 PM Maryjo Hedrick [facility]   9/30/2024  5:15 PM Maryjo Hedrick [facility]   10/7/2024  3:45 PM Maryjo Hedrick, [facility]   10/14/2024  3:45 PM Maryjo Hedrick, [facility]   10/21/2024  3:45 PM Maryjo Hedrick [facility]   10/28/2024  5:15 PM Maryjo Hedrick [facility]   11/4/2024  3:45 PM Maryjo Hedrick [facility]   11/11/2024  3:45 PM Maryjo Hedrick, [facility]         PATIENT INSTRUCTIONS:   The patient was educated on procedure site care including monitoring for any signs of infection, avoiding heavy lifting, avoiding driving for the first 24 hours after the procedure.  They were encouraged to call or message our office with any questions or concerns.  They were instructed to go to the emergency room with any signs or symptoms of stroke or any uncontrolled bleeding.  All questions were answered and the patient verbalized understanding.         ARLENE Matias  [facility] [place]  9/5/2024 7:02 PM         Electronically signed by Sheridan Anand APRN on 9/5/2024  7:07 PM         REVIEWER COMMENTS

## 2024-09-06 NOTE — TELEPHONE ENCOUNTER
Prior Authorization for Inpatient surgery initiated with Tess COX at [phone] with [employer]   Requesting coverage for: Right craniotomy extracranial or intracranial bypass   Date of Service: 09/12/24   Inpatient days requested:Inpatient  CPT codes: 12212    Request for surgery pending review, pending reference #[id number]. Clinical notes faxed to [phone], confirmation received.

## 2024-09-06 NOTE — TELEPHONE ENCOUNTER
You are scheduled for Right craniotomy extracranial or intracranial bypass on 9.13.24 with  at [facility]       Do not take any blood thinning medications, over the counter non-steroidal antiinflammatories, herbal supplements (garlic,tumeric etc.), Vitamin E, Vitamin K, fish oil or krill oil for at least 7-14 days prior to surgery.     You will be admitted the afternoon prior to surgery for IV hydration.  Further instructions will be coming regarding this next week.     You should continue taking Aspirin 81mg daily, including the morning of the surgery.     If you were on blood thinners (such as Coumadin, Plavix, Pradaxa, Xarelto, etc) prior to surgery that we had you stop for surgery, please make sure you get instructions about when to resume the medication before you are discharged from the hospital     You may only take Tylenol, Extra Strength Tylenol, Arthritis Tylenol, or prescription Norco or Tramadol for pain if something is needed.     You should have nothing to eat or drink after 11:00pm  the night prior to surgery EXCEPT GATORADE.     Do drink 12 ounces of regular Gatorade (NOT RED) 12 hours and 4 hours prior to your scheduled surgery time. Do not drink any other liquids (including water) before your surgery. Do not chew gum or eat candies before surgery.  Take 1000 mg of Tylenol (Acetaminophen) 4 hours before your scheduled surgery time, take this with your scheduled Gatorade.     The hospital will contact you 1-2 days before surgery with your arrival time.      If an MRI is needed for your surgery, it may be scheduled a day before or the morning of your surgery.                             Our office will get authorization for surgery. We will attempt to contact you 3 days before surgery if we run into any complications or have not received your surgery authorization. We will continue to attempt to get authorization until 2pm the day before surgery. If authorization is not received we  will give you a call to discuss next steps. Our goal is to make sure you do not proceed with an un-authorized surgery so that you do not end up having to pay for this surgery out of pocket.  You will be in the Intensive Care Unit overnight, it is an average 3-5 days inpatient stay.  This is an estimate and varies from person to person.  Ultimately, the surgeon will determine when you are ready to be discharged.     You can expect to have some facial swelling on side of surgical site, which may migrate to opposite side. This is normal.      Post operative appointments to be made 2 and 6 weeks after surgery.       Your 2 week post-op appointment is on 9.29.24 at 10:45 am with ARLENE Matias in our [place] office located at [address] [place] [address] Adin Carvalho at [facility]  Your 6 week post-op appointment is on 10.25.24 at 9:30 am with Dr Guzman in our [place] office     Please make sure to arrive at least 15 minutes prior to your scheduled appointment in order to get checked in and roomed in a timely manner.  Depending on provider availability, late patients may be required to reschedule.  REFILLS:  After surgery, please remember that we do have a 48 hour refill policy that does not include weekends, please make sure to request your medications in a timely manner so that you do not go days without medication.  *Refills should be requested through your pharmacy or through the refill request in [employer] (log in, go to medications, then select refill request).     [employer] MESSAGING:  Please remember that our office is closed during the weekend and no one is available for [employer] messages. If you have an urgent or emergent matter please go to a walk-in center or the emergency room. Also please remember your [employer] messages are part of your legal medical record and should not be utilized as a personal email with our providers as it is visible to all [facility] employees. Also, Since [employer] messages are not  for emergent matters it may be several days before there is a response to your message.     FMLA/PAPERWORK:  If you require FMLA paperwork for your surgery, please make sure to either Drop it off or have it faxed to our office at [phone]. Make sure it has your NAME, , and has your signature. If your signature is not on the form you will need to have a Release of Information on file. To facilitate this process we ask that you requested it at the  on your way out and sign it. Without a signed JERAMY or signature on the form we will not be able to fax it and this will cause a delay with your forms.  **We do have a 2 week policy for all forms and paperwork, please make sure to allow plenty of time for completion. Same day paperwork will not be completed. **     Please visit the following website for the detailed and up-to-date visitor screening and restriction policy at [facility] [email or web address].     For office use only:     Medical Clearances requested: TRISH NGUYEN needed: [place]  CPT codes:

## 2024-09-06 NOTE — TELEPHONE ENCOUNTER
Patient is scheduled for Right craniotomy extracranial or intracranial bypass on 9.13.24 with  at [facility].    Y  form completed.  Y Surgery order signed.   Y Patient reminder placed for 9.11.24 to do direct admission day prior to Sx.(9.12 direct admit date)  Y Previous direct admit info from prior procedure pasted below for future use.  Y Routed to PA team to initiate.  Y Entire Neurosurgery Checklist Completed.    Clearances: N/A  PA: NEEDED - Routed 9.6.24 - [place]  CPT Codes: 58835          ____________        Patient to be admitted for IV hydration on 9.12.24     Direct Admission if under our surgeon, follow these steps:     Per Dr. 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     Orders:  Medical Surgical (bed type, additional instructions such as imaging requests, labs)     Page on call neuro-surgeon with admission information. (Only if directed by Neurosurgeon in office). Usually admitting surgeon will be the one informing us of need for direct admit.     Reach out directly to: Emergency Department Transfer Center at [phone] (call [phone] if that line is not working) for direct admission or transfer requests to [facility]. Inform them of admit orders. Chart who you spoke with.     3.  Informed of requested admission with pt to be admitted under Dr. Guzman (Neurosurgeon).     4. Insurance verification is now done by Transfer center.     5.  Nursing Supervisor will be notified by Transfer center.      6. Per Transfer center, Patient to present to the ER Registration desk in [facility].  Patient to inform staff that Direct Admission as been arranged. Patient should present to [place] on time advised by Transfer Center. Chart information here.     7. If nurse information provided, call to provide handoff and report that nurse information was  not provided.      8. Patient then to be notified of pending admission. Chart that they verbalized understanding of instructions.      9. Dr. Guzman to be notified of admission time as ordered by OR/Transfer Center.

## 2024-09-13 NOTE — ANESTHESIA PROCEDURE NOTES
Arterial Line    Date/Time: 9/13/2024 8:25 AM    Performed by: Jeff Cano MD  Authorized by: Jeff Cano MD    General Information and Staff    Procedure Start:  9/13/2024 8:25 AM  Procedure End:  9/13/2024 8:35 AM  Anesthesiologist:  Jeff Cano MD  Performed By:  Anesthesiologist  Patient Location:  OR  Indication: continuous blood pressure monitoring and blood sampling needed    Site Identification: real time ultrasound guided and surface landmarks    Preanesthetic Checklist: 2 patient identifiers, IV checked, risks and benefits discussed, monitors and equipment checked, pre-op evaluation, timeout performed, anesthesia consent and sterile technique used    Procedure Details    Catheter Size:  20 G  Catheter Length:  1 and 3/4 inch  Catheter Type:  Arrow  Seldinger Technique?: Yes    Laterality:  Left  Site:  Radial artery  Site Prep: chlorhexidine    Line Secured:  Wrist Brace, tape and Tegaderm    Assessment    Events: patient tolerated procedure well with no complications      Medications  9/13/2024 8:25 AM      Additional Comments

## 2024-09-13 NOTE — PROGRESS NOTES
NURSING ADMISSION NOTE    Patient admitted via cart with spouse at the bedside  Oriented to room  Safety precautions initiated  Bed in low position  Call light within reach    Patient alert and oriented x 4. Up SBA. On RA with adequate O2 saturations. NSR/SB on tele. Continent of bowel and bladder. No complaints of pain, shortness of breath or chest pain/discomfort. IV fluids infusing. POC : Neuro checks q4h, EDAS procedure. Fall precautions in place. Call light within reach.

## 2024-09-13 NOTE — PLAN OF CARE
Received pt from OR @ 1545. Pt endorsed headache, morphine given with appropriate response. Pt is drowsy, but alert and oriented to person, place, and situation. Pt moves all 4 extremities to commands- 3/5 strength. FINESSE drain to bulb suction- 30cc bloody output. SBP remains within goal of 120-180. Increased IVF for urine output. Updated patient and spouse on plan of care, all questions answered.      1900: On hourly neuro assessment pt is following commands but aphasic, unable to speak. Strength remains 3/5 in all extremities, pupils PERRLA. 500 ml of fluids given per neuro critical care. Dr. Guzman aware.      Problem: NEUROLOGICAL - ADULT  Goal: Achieves stable or improved neurological status  Description: INTERVENTIONS  - Assess for and report changes in neurological status  - Initiate measures to prevent increased intracranial pressure  - Maintain blood pressure and fluid volume within ordered parameters to optimize cerebral perfusion and minimize risk of hemorrhage  - Monitor temperature, glucose, and sodium. Initiate appropriate interventions as ordered  Outcome: Progressing  Goal: Absence of seizures  Description: INTERVENTIONS  - Monitor for seizure activity  - Administer anti-seizure medications as ordered  - Monitor neurological status  Outcome: Progressing  Goal: Remains free of injury related to seizure activity  Description: INTERVENTIONS:  - Maintain airway, patient safety  and administer oxygen as ordered  - Monitor patient for seizure activity, document and report duration and description of seizure to MD/LIP  - If seizure occurs, turn patient to side and suction secretions as needed  - Reorient patient post seizure  - Seizure pads on all 4 side rails  - Instruct patient/family to notify RN of any seizure activity  - Instruct patient/family to call for assistance with activity based on assessment  Outcome: Progressing     Problem: PAIN - ADULT  Goal: Verbalizes/displays adequate comfort level or  patient's stated pain goal  Description: INTERVENTIONS:  - Encourage pt to monitor pain and request assistance  - Assess pain using appropriate pain scale  - Administer analgesics based on type and severity of pain and evaluate response  - Implement non-pharmacological measures as appropriate and evaluate response  - Consider cultural and social influences on pain and pain management  - Manage/alleviate anxiety  - Utilize distraction and/or relaxation techniques  - Monitor for opioid side effects  - Notify MD/LIP if interventions unsuccessful or patient reports new pain  - Anticipate increased pain with activity and pre-medicate as appropriate  Outcome: Progressing

## 2024-09-13 NOTE — OPERATIVE REPORT
MRN: [id number]  Patient Name: Isabella Kevin  YOB: 1970     DATE OF PROCEDURE:  09/13/24     PREOPERATIVE DIAGNOSIS:    Symptomatic left sided moyamoya disease     POSTOPERATIVE DIAGNOSIS:    Symptomatic left sided moyamoya disease     PROCEDURE:    1. Left-sided frontotemporal jhimtcfhy-abcu-hlhoc-synangiosis (EDAS) indirect bypass  2. Intraoperative ultrasound  3. Intraoperative microscope     SURGEON:    Cornelio Guzman MD     Assitant  WENDY Morgan     IMPLANTS:    [place] titanium cranial plating system.     ANESTHESIA:    General.     EBL:    100 mL.     DRAINS:    1 subgaleal opal drain.     SPECIMEN:    None.     INDICATION:    The patient is a 54 year old female with symptomatic left sided moyamoya disease who presents for left sided indirect bypass. Following a detailed discussion of the risks, benefits, alternatives, goals, and expectations of surgery, the patient and her  wished to proceed with surgery as recommended.     DESCRIPTION OF PROCEDURE:    She was brought to the operating room and an arterial line was placed, and then general anesthesia was induced while maintaining strict blood pressure control.  Normocapnia and euvolemia were maintained. Keppra, decadron, and antibiotics were administered. We then mapped out the parietal branch of the left superficial temporal artery using ultrasound (permanent image saved).  After this, we clipped the hair from the left side of the scalp.  We planned an incision accordingly.  Neuromonitoring was connected and baselines were obtained. We then used DuraPrep and draped in sterile fashion.  We then began by dissecting out the posterior branch of the superficial temporal artery using the intraoperative microscope. We then opened the temporalis muscle.  A craniotomy was fashioned and hemostasis was achieved. The dura was then opened in a cruciate fashion.  The arachnoid was widely opened. Then we got complete hemostasis. We  then inverted the dura underneath the craniotomy.  The arterial graft was then placed on the brain. We placed a duragen overlying the brain and arterial graft.  We then replaced the bone flap with titanium plates.  A subgaleal opal drain was brought through a separate stab incision. We then approximated the temporalis muscle.  We then dopplered the STA graft, which had good triphasic flow.  We then closed the galea with 3-0 Vicryl sutures followed by staples for skin, antibiotic ointment and dry dressing was applied to the skin.  All other counts were correct. The patient was then extubated in the OR.  An immediate post operative head CT was obtained.  The patient was transferred to the neurosurgical intensive care unit in stable condition.

## 2024-09-13 NOTE — ANESTHESIA PREPROCEDURE EVALUATION
PRE-OP EVALUATION    Patient Name: Isabella Kevin    Admit Diagnosis: MoyaMoya disease  Moyamoya  Moyamoya disease    Pre-op Diagnosis: Moyamoya disease [I67.5]  Cerebrovascular accident (CVA) due to other mechanism (HCC) [I63.89]    CRANIOTOMY FOR A LEFT ENCEPHALODUROARTERIOSYNANGIOSIS (EDAS) INDIRECT BYPASS    Anesthesia Procedure: CRANIOTOMY FOR A LEFT ENCEPHALODUROARTERIOSYNANGIOSIS (EDAS) INDIRECT BYPASS (Left)  INTRAOPERATIVE NEURO MONITORING    Surgeons and Role:     * Cornelio Guzman MD - Primary    Pre-op vitals reviewed.  Temp: 98.1 °F (36.7 °C)  Pulse: 57  Resp: 19  BP: 134/70  SpO2: 97 %  Body mass index is 24.68 kg/m².    Current medications reviewed.  Hospital Medications:  • sodium chloride 0.9% infusion   Intravenous Continuous   • [Transfer Hold] aspirin DR tab 325 mg  325 mg Oral Daily   • [Transfer Hold] acetaminophen (Tylenol) tab 650 mg  650 mg Oral Q4H PRN   • [Transfer Hold] polyethylene glycol (PEG 3350) (Miralax) 17 g oral packet 17 g  17 g Oral Daily PRN   • [Transfer Hold] sennosides (Senokot) tab 17.2 mg  17.2 mg Oral Nightly PRN   • [Transfer Hold] bisacodyl (Dulcolax) 10 MG rectal suppository 10 mg  10 mg Rectal Daily PRN   • [Transfer Hold] fleet enema (Fleet) rectal enema 133 mL  1 enema Rectal Once PRN   • [Transfer Hold] vortioxetine (Trintellix) tab 10 mg  10 mg Oral Nightly   • [Transfer Hold] progesterone (Prometrium) cap 100 mg  100 mg Oral Nightly   • [Transfer Hold] atorvastatin (Lipitor) tab 40 mg  40 mg Oral Nightly       Outpatient Medications:     Medications Prior to Admission   Medication Sig Dispense Refill Last Dose   • aspirin 325 MG Oral Tab Take 1 tablet (325 mg total) by mouth daily. 30 tablet 5 9/12/2024   • estradiol 0.025 MG/24HR Transdermal Patch Biweekly Place 1 patch onto the skin twice a week.   9/12/2024   • progesterone 100 MG Oral Cap Take 1 capsule (100 mg total) by mouth nightly.   9/11/2024   • vortioxetine 10 MG Oral Tab Take 1 tablet  (10 mg total) by mouth nightly.   9/11/2024   • atorvastatin 40 MG Oral Tab Take 1 tablet (40 mg total) by mouth nightly. 30 tablet 2 9/11/2024   • amphetamine-dextroamphetamine 10 MG Oral Tab Take 1 tablet (10 mg total) by mouth 3 (three) times daily as needed.          Allergies: Ciprofloxacin-hydrocortisone, Ibuprofen, Birch tar oil [betula alba oil], and Modafinil      Anesthesia Evaluation        Anesthetic Complications           GI/Hepatic/Renal                                 Cardiovascular            MET: >4      (+) hypertension                                     Endo/Other      (-) diabetes                            Pulmonary      (-) asthma         (-) shortness of breath            Neuro/Psych  Comment: Moyamoya disease        (+) CVA                        Past Surgical History:   Procedure Laterality Date   • Ablation  2011    uterus     Social History     Socioeconomic History   • Marital status:    Tobacco Use   • Smoking status: Never   • Smokeless tobacco: Never   Vaping Use   • Vaping status: Never Used   Substance and Sexual Activity   • Alcohol use: Not Currently     Alcohol/week: 0.8 standard drinks of alcohol     Types: 1 drink(s) per week   • Drug use: No   • Sexual activity: Yes     Partners: Male     Birth control/protection: Surgical     Comment: ablation    Other Topics Concern   • Caffeine Concern No   • Stress Concern No   • Weight Concern No   • Special Diet No   • Exercise No   • Seat Belt Yes     History   Drug Use No     Available pre-op labs reviewed.  Lab Results   Component Value Date    WBC 4.4 09/13/2024    RBC 3.62 (L) 09/13/2024    HGB 11.1 (L) 09/13/2024    HCT 31.7 (L) 09/13/2024    MCV 87.6 09/13/2024    MCH 30.7 09/13/2024    MCHC 35.0 09/13/2024    RDW 11.9 09/13/2024    .0 09/13/2024     Lab Results   Component Value Date     09/13/2024    K 3.8 09/13/2024     09/13/2024    CO2 26.0 09/13/2024    BUN 9 09/13/2024    CREATSERUM 0.69  09/13/2024    GLU 92 09/13/2024    CA 9.1 09/13/2024     Lab Results   Component Value Date    INR 1.09 09/12/2024         Airway      Mallampati: II  Mouth opening: 3 FB  TM distance: 4 - 6 cm  Neck ROM: full Cardiovascular    Cardiovascular exam normal.         Dental  Comment: Normal wear/minor imperfections and discoloration noted. No grossly weakened or damaged teeth on exam.           Pulmonary    Pulmonary exam normal.                 Other findings        ASA: 3   Plan: general  NPO status verified and patient meets guidelines.        Comment: We discussed general anesthesia as the primary form of anesthesia for this procedure. General anesthesia involves the use a breathing tube to help the patient breathe and deliver anesthetic gasses. Several intravenous medications will be used to help keep the patient safe and comfortable.  Common risks with general anesthesia include nausea, vomiting, scratched eye, sore throat and dental damage. The risk of dental damage is higher for weakened or damaged teeth. Rare but serious risks can occur. Some of these serious complications include brain injury, nerve injury, blindness, and death. Risks of these serious injury are small, but never zero. I asked the patient if they had any questions about the consent form and what was written on it. The patient was given the opportunity to ask questions in the pre op area and in the operating room before going to sleep. All questions were answered.  WE also discussed ralph, additional IV placement, possible post op intubation.               Present on Admission:  **None**

## 2024-09-13 NOTE — ANESTHESIA PROCEDURE NOTES
Airway  Date/Time: 9/13/2024 8:42 AM  Urgency: elective      General Information and Staff    Patient location during procedure: OR  Anesthesiologist: Jeff Cano MD  Performed: anesthesiologist   Performed by: Jeff Cano MD  Authorized by: Jeff Cano MD      Indications and Patient Condition  Indications for airway management: anesthesia  Sedation level: deep  Preoxygenated: yes  Patient position: sniffing  Mask difficulty assessment: 1 - vent by mask    Final Airway Details  Final airway type: endotracheal airway      Successful airway: ETT  Cuffed: yes   Successful intubation technique: direct laryngoscopy  Endotracheal tube insertion site: oral  Blade: GlideScope  Blade size: #3  ETT size (mm): 7.5    Cormack-Lehane Classification: grade I - full view of glottis  Placement verified by: capnometry   Measured from: lips  Number of attempts at approach: 1

## 2024-09-13 NOTE — H&P
History & Physical Examination    Patient Name: Isabella Kevin  MRN: [id number]  [facility]: [id number]  YOB: 1970    Diagnosis: Moyamoya disease    Present Illness: 55 y/o female with PMH of ADHD who presented to the ED on 8/27 with acute slurred speech and language difficulty. MRI demonstrated a left hemisphere focus of infarct. CTA head and neck revealed left ICA terminus steno-occlusive disease consistent with Moyamoya. Patient was started on ASA and Plavix. She presents today for craniotomy for left encephaloduroarteriosynangiosis.     At present she denies any neurologic complaints. No recent episodes of speech difficulty. Stopped Plavix last week. Last ASA was this morning.     Medications Prior to Admission   Medication Sig Dispense Refill Last Dose    aspirin 325 MG Oral Tab Take 1 tablet (325 mg total) by mouth daily. 30 tablet 5 9/12/2024    estradiol 0.025 MG/24HR Transdermal Patch Biweekly Place 1 patch onto the skin twice a week.   9/12/2024    progesterone 100 MG Oral Cap Take 1 capsule (100 mg total) by mouth nightly.   9/11/2024    vortioxetine 10 MG Oral Tab Take 1 tablet (10 mg total) by mouth nightly.   9/11/2024    atorvastatin 40 MG Oral Tab Take 1 tablet (40 mg total) by mouth nightly. 30 tablet 2 9/11/2024    amphetamine-dextroamphetamine 10 MG Oral Tab Take 1 tablet (10 mg total) by mouth 3 (three) times daily as needed.        Current Facility-Administered Medications   Medication Dose Route Frequency    sodium chloride 0.9% infusion   Intravenous Continuous    aspirin DR tab 325 mg  325 mg Oral Daily    acetaminophen (Tylenol) tab 650 mg  650 mg Oral Q4H PRN    polyethylene glycol (PEG 3350) (Miralax) 17 g oral packet 17 g  17 g Oral Daily PRN    sennosides (Senokot) tab 17.2 mg  17.2 mg Oral Nightly PRN    bisacodyl (Dulcolax) 10 MG rectal suppository 10 mg  10 mg Rectal Daily PRN    fleet enema (Fleet) rectal enema 133 mL  1 enema Rectal Once PRN    vortioxetine  (Trintellix) tab 10 mg  10 mg Oral Nightly    progesterone (Prometrium) cap 100 mg  100 mg Oral Nightly    atorvastatin (Lipitor) tab 40 mg  40 mg Oral Nightly       Allergies:   Allergies   Allergen Reactions    Ciprofloxacin-Hydrocortisone ITCHING and HIVES     Tingling of hands and feet    Ibuprofen SWELLING    Birch Tar Oil [Betula Alba Oil]      [place]    Modafinil OTHER (SEE COMMENTS)     Pt felt like throat was closing       Past Medical History:    ADHD (attention deficit hyperactivity disorder)    Allergic rhinitis    Idiopathic hypersomnia    Osteoporosis    Seasonal affective disorder (HCC)    Vitamin D deficiency     Past Surgical History:   Procedure Laterality Date    Ablation  2011    uterus     Family History   Problem Relation Age of Onset    Hypertension Mother     Hypertension Brother     Other (stomach cancer) Paternal Aunt      Social History     Tobacco Use    Smoking status: Never    Smokeless tobacco: Never   Substance Use Topics    Alcohol use: Not Currently     Alcohol/week: 0.8 standard drinks of alcohol     Types: 1 drink(s) per week     PERRLA 3+. EOMI. Face symmetric. Tongue midline. Speech fluent. Shoulder shrug normal. CN II-XII intact.  Strength 5/5 throughout.     [ x ] I have reviewed the risks and benefits and alternatives with the patient/family.  They understand and agree to proceed with plan of care.  [ x ] I have reviewed the History and Physical done within the last 30 days.  Any changes noted above.    Dr. Guzman to follow    WENDY Morgan  9/13/2024  7:18 AM

## 2024-09-13 NOTE — CONSULTS
General Medicine Consult      Reason for consult: Medical management    Consulted by: Dr. Guzman    PCP: Mildred Steward MD      History of Present Illness: Patient is a 54 year old female with PMH sig for ADHD who presented for elective craniotomy.  Patient initially presented to the emergency department on 8/27 for acute slurred speech.  MRI at that time demonstrated a left hemisphere focus of infarct.  Further workup suggested disease presentation consistent with moyamoya.  Patient was sent home on aspirin and Plavix and was scheduled for elective procedure with neurosurgery.  She underwent left-sided EDAS indirect bypass.  I evaluated the patient postoperatively.  She is drowsy but follows commands.  Her pain is controlled.        PMH:  Past Medical History:    ADHD (attention deficit hyperactivity disorder)    Allergic rhinitis    Idiopathic hypersomnia    Osteoporosis    Seasonal affective disorder (HCC)    Vitamin D deficiency        PSH:  Past Surgical History:   Procedure Laterality Date    Ablation  2011    uterus        Home Medications:  Outpatient Medications Marked as Taking for the 9/12/24 encounter (Hospital Encounter)   Medication Sig Dispense Refill    aspirin 325 MG Oral Tab Take 1 tablet (325 mg total) by mouth daily. 30 tablet 5    estradiol 0.025 MG/24HR Transdermal Patch Biweekly Place 1 patch onto the skin twice a week.      progesterone 100 MG Oral Cap Take 1 capsule (100 mg total) by mouth nightly.      vortioxetine 10 MG Oral Tab Take 1 tablet (10 mg total) by mouth nightly.      atorvastatin 40 MG Oral Tab Take 1 tablet (40 mg total) by mouth nightly. 30 tablet 2       Scheduled Medication:   morphINE PF        [Transfer Hold] aspirin  325 mg Oral Daily    [Transfer Hold] vortioxetine  10 mg Oral Nightly    [Transfer Hold] progesterone  100 mg Oral Nightly    [Transfer Hold] atorvastatin  40 mg Oral Nightly     Continuous Infusing Medication:   sodium chloride 125 mL/hr at 09/13/24 0616      PRN Medication:  morphINE PF    [Transfer Hold] acetaminophen    [Transfer Hold] polyethylene glycol (PEG 3350)    [Transfer Hold] sennosides    [Transfer Hold] bisacodyl    [Transfer Hold] fleet enema     ALL:  Allergies   Allergen Reactions    Ciprofloxacin-Hydrocortisone ITCHING and HIVES     Tingling of hands and feet    Ibuprofen SWELLING    Birch Tar Oil [Betula Alba Oil]      [place]    Modafinil OTHER (SEE COMMENTS)     Pt felt like throat was closing        Soc Hx:  Social History     Tobacco Use    Smoking status: Never    Smokeless tobacco: Never   Substance Use Topics    Alcohol use: Not Currently     Alcohol/week: 0.8 standard drinks of alcohol     Types: 1 drink(s) per week        Fam Hx  Family History   Problem Relation Age of Onset    Hypertension Mother     Hypertension Brother     Other (stomach cancer) Paternal Aunt        Review of Systems  Comprehensive ROS reviewed and negative except for what's stated above.  Including negative for chest pain, shortness of breath, syncope.       OBJECTIVE:  /84 (BP Location: Left arm)   Pulse 73   Temp 97 °F (36.1 °C) (Temporal)   Resp 10   Wt 134 lb 14.7 oz (61.2 kg)   SpO2 100%   BMI 24.68 kg/m²   General:  Alert, no distress, appears stated age.  Drowsy but arousable, answers questions appropriately.   Head:  Surgical staples noted, drain with serosanguineous output   Eyes:  Sclera anicteric, No conjunctival pallor, EOMs intact.    Nose: Nares normal. Septum midline. Mucosa normal. No drainage.   Throat: Lips, mucosa, and tongue normal. Teeth and gums normal.   Neck: Supple, symmetrical, trachea midline,    Lungs:   Clear to auscultation bilaterally. Normal effort   Chest wall:  No tenderness or deformity.   Heart:  Regular rate and rhythm, S1, S2 normal, no murmur, rub or gallop appreciated   Abdomen:   Soft, non-tender. Bowel sounds normal.    Non distended   Extremities: Extremities normal, atraumatic, no cyanosis or edema.   Skin:  Skin color, texture, turgor normal. No rashes or lesions.    Neurologic: Very drowsy answers yes or no question, formal exam not performed        Diagnostics:   CBC/Chem  Recent Labs   Lab 09/12/24 1942 09/12/24 1943 09/13/24  0508   WBC 4.8  --  4.4   HGB 11.0*  --  11.1*   MCV 88.7  --  87.6   .0  --  213.0   INR  --  1.09  --        Recent Labs   Lab 09/12/24 1943 09/13/24  0508    143   K 4.4 3.8    110   CO2 28.0 26.0   BUN 13 9   CREATSERUM 0.83 0.69   * 92   CA 9.6 9.1       Recent Labs   Lab 09/13/24  0508   ALT 17   AST 13   ALB 4.1       No results for input(s): \"TROP\" in the last 168 hours.      Radiology: CT BRAIN OR HEAD (CPT=70450)    Result Date: 9/13/2024  CONCLUSION:  1. There is postoperative change from recent left craniotomy for revascularization procedure. 2. Trace pneumocephalus and hemorrhage is noted along craniotomy defect. 3. There is an evolving stroke in the left frontal lobe.    LOCATION:  [facility]   Dictated by (CST): Javan Guadalupe MD on 9/13/2024 at 3:35 PM     Finalized by (CST): Javan Guadalupe MD on 9/13/2024 at 3:41 PM       US INTRAOPERATIVE GUIDANCE (CPT=76998)    Result Date: 9/13/2024  CONCLUSION:   Sonographic guidance provided for intracranial intervention.  Please refer to dedicated procedure report for full detail.   LOCATION:  [facility]    Dictated by (CST): Mariam Samuel MD on 9/13/2024 at 10:34 AM     Finalized by (CST): Mariam Samuel MD on 9/13/2024 at 10:35 AM          ASSESSMENT / PLAN:     Patient is a 54 year old female with PMH sig for ADHD who presented for elective craniotomy for moyamoya disease.  She is status post EDAS.    Recent CVA  Moyamoya disease  Status post EDAS, POD 0  - Neurochecks  - Pain management per neurosurgery  - PT/OT/speech evaluation-stable  - Brown to be maintained  - BP parameters systolic 120-180  -Keppra 500 mgTwice daily  -Neurosurgery and neurointensivist following    ADHD  - Currently hold home meds      Prophylaxis:    DVT with scd, heparin subcu  Atrophy Prophylaxis PT/OT  Lines/Monitors:     Dispo: CCU  Code status: Full    Patient and/or patient's family given opportunity to ask questions and note understanding and agreeing with therapeutic plan as outlined    Thank you for allowing me to participate in the care of this patient.     Thank You,  DO MICKEY Haji Hospitalist  Pager   Answering Service number: [phone]

## 2024-09-13 NOTE — ANESTHESIA PROCEDURE NOTES
Peripheral IV  Date/Time: 9/13/2024 8:50 AM  Inserted by: Jeff Cano MD    Placement  Needle size: 18 G  Laterality: right  Location: forearm  Local anesthetic: none  Site prep: alcohol  Technique: anatomical landmarks  Attempts: 1

## 2024-09-13 NOTE — PLAN OF CARE
Received pt at 0730.   Pt is A&Ox4, no pain, up at shanti. Neurology at bedside explaining procedure, transport on way to get patient. NSR. Pt updated with plan of care.

## 2024-09-13 NOTE — ANESTHESIA POSTPROCEDURE EVALUATION
[facility]    Isabella Kevin Patient Status:  Inpatient   Age/Gender 54 year old female MRN [id number]   Location [facility] 6NE-A Attending Cornelio Guzman MD   Hosp Day # 1 PCP Mildred Steward MD       Anesthesia Post-op Note    CRANIOTOMY FOR A LEFT ENCEPHALODUROARTERIOSYNANGIOSIS (EDAS) INDIRECT BYPASS    Procedure Summary       Date: 09/13/24 Room / Location:  MAIN OR  /  MAIN OR    Anesthesia Start: 0821 Anesthesia Stop: 1536    Procedures:       CRANIOTOMY FOR A LEFT ENCEPHALODUROARTERIOSYNANGIOSIS (EDAS) INDIRECT BYPASS (Left: Head)      INTRAOPERATIVE NEURO MONITORING (Head) Diagnosis:       Moyamoya disease      Cerebrovascular accident (CVA) due to other mechanism (HCC)      (Moyamoya disease [I67.5]Cerebrovascular accident (CVA) due to other mechanism (HCC) [I63.89])    Surgeons: Cornelio Guzman MD Anesthesiologist: Jeff Cano MD    Anesthesia Type: general ASA Status: 3            Anesthesia Type: general    Vitals Value Taken Time   /78 09/13/24 1541   Temp EPIC 09/13/24 1541   Pulse 74 09/13/24 1540   Resp 13 09/13/24 1540   SpO2 100 % 09/13/24 1540   Vitals shown include unfiled device data.    Patient Location: ICU    Anesthesia Type: general    Airway Patency: patent and extubated    Postop Pain Control: adequate    Mental Status: mildly sedated but able to meaningfully participate in the post-anesthesia evaluation    Nausea/Vomiting: none    Cardiopulmonary/Hydration status: stable euvolemic    Complications: no apparent anesthesia related complications    Postop vital signs: stable    Comments: Report given, full monitors on transport.     Dental Exam: Unchanged from Preop    Patient to be transferred to ICU.

## 2024-09-14 NOTE — CM/SW NOTE
Chart reviewed for DC planning. Pt admitted on 9/12 for planned L Crani. Anticipated therapy need: Intensive Therapy Program, PMR ordered per protocol.       HOME SITUATION  Type of Home: House   Home Layout: Two level     Lives With: Spouse (one child living at home, second child away at school)  Drives: Yes     Prior Level of [place]: per  at b/s: community indep; after recent stroke, pt indep, did not return to work, however was busy with scrapbooking activities; reported deficits with slow speech, declines difficulty with word finding     ALINA Hebert

## 2024-09-14 NOTE — PHYSICAL THERAPY NOTE
PHYSICAL THERAPY EVALUATION - INPATIENT     Room Number: 6620/6620-A  Evaluation Date: 9/14/2024  Type of Evaluation: Initial  Physician Order: PT Eval and Treat    Presenting Problem: moyamoya  Co-Morbidities : L hemispheric stroke 9/3/2024, ADHD, vit D deficiency, mcclain mcclain  Reason for Therapy: Mobility Dysfunction and Discharge Planning    PHYSICAL THERAPY ASSESSMENT   Patient is currently functioning below baseline with bed mobility, transfers, gait, and standing prolonged periods.  Prior to admission, patient's baseline is indep.  Patient is requiring minimal assist as a result of the following impairments: impaired standing balance, medical status, and RUE weakness.  Pt able to progress to standing, however limited due to hemodynamic stability.  Pt with difficulty speaking, requiring assistance and support for communication . Physical Therapy will continue to follow for duration of hospitalization.      Patient will benefit from continued skilled PT Services to facilitate return to prior level of function as patient demonstrates high motivation with excellent tolerance to an intensive therapy program .    PLAN  PT Treatment Plan: Patient education;Gait training;Neuromuscular re-educate;Transfer training;Stair training;Balance training  Rehab Potential : Good  Frequency (Obs): 3-5x/week  Number of Visits to Meet Established Goals: 4      CURRENT GOALS    Goal #1 Patient is able to demonstrate supine - sit EOB @ level: independent     Goal #2 Patient is able to demonstrate transfers EOB to/from Chair/Wheelchair at assistance level: modified independent     Goal #3 Patient is able to ambulate 200 feet with assist device: none at assistance level: supervision     Goal #4    Goal #5    Goal #6    Goal Comments: Goals established on 9/14/2024      PHYSICAL THERAPY MEDICAL/SOCIAL HISTORY  History related to current admission: Patient is a 54 year old female admitted on 9/12/2024 from home for L crani.  Pt with recent  L hemispheric stroke on 9/3, discharged home on 9/5.  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  Post-op with aphasia and R UE weakness.  CTH stable, SBP to be between 150-180      HOME SITUATION  Type of Home: House   Home Layout: Two level                Lives With: Spouse (one child living at home, second child away at school)  Drives: Yes          Prior Level of [place]: per  at b/s: community indep; after recent stroke, pt indep, did not return to work, however was busy with scrapbooking activities; reported deficits with slow speech, declines difficulty with word finding    SUBJECTIVE  C/o incisonal pain and fatigue      OBJECTIVE  Precautions: Drain(s) (-180)  Fall Risk: High fall risk    WEIGHT BEARING RESTRICTION                   PAIN ASSESSMENT  Rating: Unable to rate  Location: L crani incision  Management Techniques: Activity promotion;Repositioning    COGNITION  Alert throughout the session, difficulty with word finding, able to communicate with supportive, yes/no questions with increased time and assistance; visibly upset/frustrated when could not find the word    RANGE OF MOTION AND STRENGTH ASSESSMENT  Upper extremity ROM and strength are within functional limits: R UE weakness, grossly 3/5    Lower extremity ROM is within functional limits     Lower extremity strength is within functional limits: R ankle DF 4/5, otherwise 5/5      BALANCE  Static Sitting: Fair  Dynamic Sitting: Fair  Static Standing: Fair  Dynamic Standing: Fair    ADDITIONAL TESTS  Additional Tests: Modified Paige              Modified [place]: 4                  ACTIVITY TOLERANCE                         O2 WALK       NEUROLOGICAL FINDINGS         SILT BLE, coordination LE intact               AM-PAC '6-Clicks' INPATIENT SHORT FORM - BASIC MOBILITY  How much difficulty does the patient currently have...  Patient Difficulty: Turning over in bed (including adjusting bedclothes, sheets and  blankets)?: A Little   Patient Difficulty: Sitting down on and standing up from a chair with arms (e.g., wheelchair, bedside commode, etc.): A Little   Patient Difficulty: Moving from lying on back to sitting on the side of the bed?: A Little   How much help from another person does the patient currently need...   Help from Another: Moving to and from a bed to a chair (including a wheelchair)?: A Little   Help from Another: Need to walk in hospital room?: A Little   Help from Another: Climbing 3-5 steps with a railing?: A Little       AM-PAC Score:  Raw Score: 18   Approx Degree of Impairment: 46.58%   Standardized Score (AM-PAC Scale): 43.63   CMS Modifier (G-Code): CK    FUNCTIONAL ABILITY STATUS  Gait Assessment   Functional Mobility/Gait Assessment  Gait Assistance: Minimum assistance  Distance (ft): 2  Assistive Device:  (hand held assist)    Skilled Therapy Provided     Bed Mobility:  Rolling: CGA  Supine to sit: CGA   Sit to supine: CGA  Able to scoot to EOB supervision  Sitting balance: supervision   Tolerated sitting at EOB x 10mins    Transfer Mobility:  Sit to stand: min A   Stand to sit: min A  Gait = 2ft with HHA CGA  Tolerated standing x 1 min with CGA  Noted BP to decrease to 130s in standing        Exercise/Education Provided:  Activity recs, role of therapy, pt in agreement    Patient End of Session: In bed;Needs met;With  staff;Call light within reach;All patient questions and concerns addressed      Patient Evaluation Complexity Level:  History Moderate - 1 or 2 personal factors and/or co-morbidities   Examination of body systems Moderate - addressing a total of 3 or more elements   Clinical Presentation  Moderate - Evolving   Clinical Decision Making Moderate Complexity       PT Session Time: 40 minutes  Therapeutic Activity: 15 minutes

## 2024-09-14 NOTE — PLAN OF CARE
Assumed pt care this AM. NSR on telemetry, unlabored on RA, SBP parameters 150-180s met with phenylephrine gtt titration. R facial droop, expressive aphasia, mild R sided weakness and intermittent R drift. Neuro checks q1h, stable exam, Bharath Stinson and Thomas aware of the above. EEG done, cleared for soft diet/thin liquids by speech. Worked with PT/OT standing at side of bed; BP below parameters and causing increased weakness in RUE, and pt too tired to get to chair at this time. Stable on feet with weight transfer. Brown in place. 1L net negative this shift, Dr. Stinson aware. Monitor for now.       Problem: NEUROLOGICAL - ADULT  Goal: Achieves stable or improved neurological status  Description: INTERVENTIONS  - Assess for and report changes in neurological status  - Initiate measures to prevent increased intracranial pressure  - Maintain blood pressure and fluid volume within ordered parameters to optimize cerebral perfusion and minimize risk of hemorrhage  - Monitor temperature, glucose, and sodium. Initiate appropriate interventions as ordered  Outcome: Not Progressing  Goal: Absence of seizures  Description: INTERVENTIONS  - Monitor for seizure activity  - Administer anti-seizure medications as ordered  - Monitor neurological status  Outcome: Progressing  Goal: Remains free of injury related to seizure activity  Description: INTERVENTIONS:  - Maintain airway, patient safety  and administer oxygen as ordered  - Monitor patient for seizure activity, document and report duration and description of seizure to MD/LIP  - If seizure occurs, turn patient to side and suction secretions as needed  - Reorient patient post seizure  - Seizure pads on all 4 side rails  - Instruct patient/family to notify RN of any seizure activity  - Instruct patient/family to call for assistance with activity based on assessment  Outcome: Progressing     Problem: PAIN - ADULT  Goal: Verbalizes/displays adequate comfort level or patient's  stated pain goal  Description: INTERVENTIONS:  - Encourage pt to monitor pain and request assistance  - Assess pain using appropriate pain scale  - Administer analgesics based on type and severity of pain and evaluate response  - Implement non-pharmacological measures as appropriate and evaluate response  - Consider cultural and social influences on pain and pain management  - Manage/alleviate anxiety  - Utilize distraction and/or relaxation techniques  - Monitor for opioid side effects  - Notify MD/LIP if interventions unsuccessful or patient reports new pain  - Anticipate increased pain with activity and pre-medicate as appropriate  Outcome: Progressing

## 2024-09-14 NOTE — SLP NOTE
ADULT SWALLOWING EVALUATION    ASSESSMENT    ASSESSMENT/OVERALL IMPRESSION:  Pt is a54 year old female with PMH of ADHD who presented to the ED on 8/27 with slurred speech and language difficulty.  MRI demonstrated a L hemishere focus of infart.  CTA revealed left ICA terminus steno-occlusive disease consistent with Moyamoya disease.  Pt then returned to hospital for diagnostic cerebral angiogram.  Pt is now S/P planned craniotomy.  Pt is now aphasic with a breathy vocal quality.  Swallow study ordered per stroke protocol to R/O aspiration.    Pt sitting up in bed drowsy however alert.  Pt given PO trials of puree, thin and a cracker.  Oral mech revealed R sided facial weakness.  Pt with slow movement of tongue however functional mastication noted with cracker, no oral residue noted.  A/P transit appeared WNL with timely swallow response and good hyolaryngeal elevation noted via palpation.  No s/s of aspiration noted.  Recommend soft thin diet with aspiration precautions.      Pt having difficulty coming up with words however was able to say that she is hungry.  Tried a letter board for further communication however pt with difficulty spelling words as well.  Her writing hand is very weak at this time so writing was unsuccessful as well.  Will provide a communication board and a full communication eval is recommended.  Discussed with RN and pt who verbalized agreement.       RECOMMENDATIONS   Diet Recommendations - Solids: Soft/ Easy to chew  Diet Recommendations - Liquids: Thin Liquids                        Compensatory Strategies Recommended: Slow rate;Small bites and sips  Aspiration Precautions: Upright position;Slow rate;Small bites and sips  Medication Administration Recommendations: One pill at a time  Treatment Plan/Recommendations: Dysphagia therapy;Communication evaluation    HISTORY   MEDICAL HISTORY  Reason for Referral: Stroke protocol;R/O aspiration    Problem List  Active Problems:    Cerebrovascular  accident (HCC)    Moyamoya    Moyamoya disease      Past Medical History  Past Medical History:    ADHD (attention deficit hyperactivity disorder)    Allergic rhinitis    Idiopathic hypersomnia    Osteoporosis    Seasonal affective disorder (HCC)    Vitamin D deficiency       Prior Living Situation: Home with support  Diet Prior to Admission: Regular;Thin liquids  Precautions: Aspiration    Patient/Family Goals: none stated    SWALLOWING HISTORY  Current Diet Consistency: NPO  Dysphagia History:   BSSE on 9/3   Patient seen for swallowing evaluation per protocol as she exhibited slurred speech/facial droop. Patient presented to ED 8/27/24 due to slurred speech and language difficulty. She returns today for diagnostic cerebral angiogram due to concern for moyamoya on imaging. Patient is alert and up in bed, able to sit up after procedure. She notes slurred speech was improving prior to returning to hospital today but she reported difficulty with /r/ and /s/ sounds. She denied any difficulty with verbal expression or receptive language. This morning, before her procedure she was noted to develop right facial droop, mild dysarthria and mild expressive aphasia. Her  is present and supportive throughout.     Oral mechanism exam remarkable for subtle reduction in right facial tone, strength and ROM. Patient  reported no definite change from her baseline. Patient with intact oral retrieval and containment with thin liquids by straw and solid trials. Oral prep and transit appeared functional. There was no residue visualized. Mastication of solids WFL. Laryngeal excursion judged to be of adequate strength and timeliness to palpation. There were no overt signs of aspiration noted.     Recommend initiate regular consistency solids and thin liquids observing aspiration precautions. Plan to continue to follow for completion of communication assessment pending outcome of workup and clinical status.     Imaging  Results:     Sequelae of left pterional craniotomy.  There is pneumocephalus is again noted.  5 mm region of extra-axial pneumocephalus and minimal high density is relatively stable.      Brain parenchyma is unchanged.  Low-density regions in the left frontal lobe are stable.      Ventricles and sulci are appropriate for the patient's age.      No significant mass effect.      Visualized portions of paranasal sinuses are unremarkable.      Visualized portions of mastoid air cells are unremarkable.      Visualized portions of orbits are unremarkable.      IMPRESSION:   No significant changes.       SUBJECTIVE       OBJECTIVE   ORAL MOTOR EXAMINATION  Dentition: Natural;Functional  Symmetry: Reduced right facial  Strength: Reduced right facial  Tone: Reduced right facial  Range of Motion: Reduced right facial  Rate of Motion: Reduced    Voice Quality: Weak;Breathy  Respiratory Status: Unlabored  Consistencies Trialed: Thin liquids;Puree;Hard solid  Method of Presentation: Self presentation;Staff/Clinician assistance  Patient Positioning: Upright;Midline    Oral Phase of Swallow: Impaired              Mastication: Impaired       Pharyngeal Phase of Swallow: Within Functional Limits           (Please note: Silent aspiration cannot be evaluated clinically. Videofluoroscopic Swallow Study is required to rule-out silent aspiration.)       Comments:               GOALS  Goal #1 The patient will tolerate soft consistency and thin liquids without overt signs or symptoms of aspiration with 95 % accuracy over 1-2 session(s).  In Progress   Goal #2 The patient/family/caregiver will demonstrate understanding and implementation of aspiration precautions and swallow strategies independently over 1-2 session(s).    In Progress   Goal #3 Communication eval   New goal   Goal #4     Goal #5     Goal #6     Goal #7     Goal #8       FOLLOW UP  Treatment Plan/Recommendations: Dysphagia therapy;Communication evaluation  Number of Visits  to Meet Established Goals: 4  Follow Up Needed (Documentation Required): Yes  SLP Follow-up Date: 09/15/24    Thank you for your referral.   If you have any questions, please contact [facility]

## 2024-09-14 NOTE — PROGRESS NOTES
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 Patient arrived to the neuro ICU for post op monitoring in stable condition, but was noted around 7 pm to be aphasic, but able to nod appropriately to questions when given options and able to follow commands. Patient was given 500 ml bolus NS and patient with SBP in 120s, but neuro exam remained unchanged. Dr. Guzman at the bedside during exam and escorted patient to STAT CT head which was unchanged from her Post op CT scan. Additional 1.5 L bolus NS given and initiating pressors for SBP between 150-180 but ideal goal of 160s per NSGY.     Past Medical History:    ADHD (attention deficit hyperactivity disorder)    Allergic rhinitis    Idiopathic hypersomnia    Osteoporosis    Seasonal affective disorder (HCC)    Vitamin D deficiency     Past Surgical History:   Procedure Laterality Date    Ablation  2011    uterus     Social History     Socioeconomic History    Marital status:    Tobacco Use    Smoking status: Never    Smokeless tobacco: Never   Vaping Use    Vaping status: Never Used   Substance and Sexual Activity    Alcohol use: Not Currently     Alcohol/week: 0.8 standard drinks of alcohol     Types: 1 drink(s) per week    Drug use: No    Sexual activity: Yes     Partners: Male     Birth control/protection: Surgical     Comment: ablation    Other Topics Concern    Caffeine Concern No    Stress Concern No    Weight Concern No    Special Diet No    Exercise No    Seat Belt Yes     Social Determinants of Health     Food  Insecurity: No Food Insecurity (9/12/2024)    Food Insecurity     Food Insecurity: Never true   Transportation Needs: No Transportation Needs (9/12/2024)    Transportation Needs     Lack of Transportation: No   Housing Stability: Low Risk  (9/12/2024)    Housing Stability     Housing Instability: No     Family History   Problem Relation Age of Onset    Hypertension Mother     Hypertension Brother     Other (stomach cancer) Paternal Aunt      Allergies   Allergies   Allergen Reactions    Ciprofloxacin-Hydrocortisone ITCHING and HIVES     Tingling of hands and feet    Ibuprofen SWELLING    Birch Tar Oil [Bet[place] Al[place] Oil]      [place]    Modafinil OTHER (SEE COMMENTS)     Pt felt like throat was closing       Home Meds  Current Outpatient Medications   Medication Instructions    amphetamine-dextroamphetamine 10 MG Oral Tab 10 mg, Oral, 3 times daily PRN    aspirin 325 mg, Oral, Daily    atorvastatin (LIPITOR) 40 mg, Oral, Nightly    estradiol 0.025 MG/24HR Transdermal Patch Biweekly 1 patch, Transdermal, Twice weekly    progesterone (PROMETRIUM) 100 mg, Oral, Nightly    vortioxetine (TRINTELLIX) 10 mg, Oral, Nightly     Scheduled Meds:   morphINE PF        [Held by provider] heparin  5,000 Units Subcutaneous 2 times per day    levETIRAcetam  500 mg Intravenous Q12H    [Held by provider] aspirin  325 mg Oral Daily    sodium chloride  500 mL Intravenous Once    sodium chloride  1,000 mL Intravenous Once    [Transfer Hold] vortioxetine  10 mg Oral Nightly    [Transfer Hold] progesterone  100 mg Oral Nightly    [Transfer Hold] atorvastatin  40 mg Oral Nightly     Continuous Infusions:   sodium chloride 125 mL/hr at 09/13/24 1800    phenylephrine       PRN Meds:  morphINE PF    [Held by provider] labetalol    [Held by provider] hydrALAzine    ondansetron    HYDROcodone-acetaminophen **OR** HYDROcodone-acetaminophen    morphINE    [Transfer Hold] acetaminophen    [Transfer Hold] polyethylene glycol (PEG 3350)    [Transfer  Hold] sennosides    [Transfer Hold] bisacodyl    [Transfer Hold] fleet enema    Review of Systems: Unable to obtain given patients neurologic condition. Patient currently with expressive aphasia.       OBJECTIVE   VITAL SIGNS:   Temp:  [97 °F (36.1 °C)-98.1 °F (36.7 °C)] 97 °F (36.1 °C)  Pulse:  [57-73] 67  Resp:  [10-20] 15  BP: (114-147)/(66-84) 114/68  SpO2:  [97 %-100 %] 100 %  AO: (126-171)/(60-91) 126/60      INTAKE/OUTPUT:    Intake/Output Summary (Last 24 hours) at 9/13/2024 2050  Last data filed at 9/13/2024 1902  Gross per 24 hour   Intake 3600 ml   Output 5055 ml   Net -1455 ml       PHYSICAL EXAM:  GENERAL APPEARANCE: Patient resting comfortably in bed, NAD, flat affect  HEENT: Normocephalic, atraumatic.   LUNGS: Normal respiratory rate and effort   HEART: Regular rate and rhythm   ABDOMEN: Soft. No tenderness, guarding, or rebound.   EXTREMITIES: No cyanosis, clubbing, or edema.  SKIN: Warm, dry, and well perfused.  PSYCH: Flat affect    NEUROLOGIC:    Mental Status:  A&O x 4 but not able to verbalize- only nodding appropriately to questions with options given, Follows simple commands, expressive aphasia.  Cranial nerves: PERRL.  Difficulty assessing visual fields given mentation.  EOMI.  Right side facial droop noted.  Hearing grossly intact. Tongue midline with normal movements.   Motor: RUE 3/5 with drift, LUE 3-4/5, RLE 3/5 with drift, LUE 3-4/5.  Sensation: Unable to test due to mentation/ aphasia          LABORATORY DATA:  Last 24 hour labs were reviewed in detail.  Recent Labs   Lab 09/12/24 1943 09/13/24 0508 09/13/24  1617    143 143   K 4.4 3.8 3.6    110 114*   CO2 28.0 26.0 21.0   * 92 143*   BUN 13 9 <5*   CREATSERUM 0.83 0.69 0.59     Recent Labs   Lab 09/12/24 1942 09/13/24 0508 09/13/24  1617   WBC 4.8 4.4 8.8   HGB 11.0* 11.1* 11.1*   .0 213.0 212.0     Recent Labs   Lab 09/13/24  0508 09/13/24  1617   ALT 17 15   AST 13 15     No results for input(s):  \"MG\", \"PHOS\" in the last 168 hours.    Radiology:    CT BRAIN OR HEAD (CPT=70450)    Result Date: 9/13/2024  PROCEDURE:  CT BRAIN OR HEAD (42342)  COMPARISON:  [facility] , CT, CT BRAIN OR HEAD (93709), 9/13/2024, 3:25 PM.  INDICATIONS:  aphasia  TECHNIQUE:  Noncontrast CT scanning is performed through the brain. Dose reduction techniques were used. Dose information is transmitted to the ACR (American College of Radiology) NRDR (National Radiology Data Registry) which includes the Dose Index Registry.  PATIENT STATED HISTORY: (As transcribed by Technologist)  Post-op.    FINDINGS: Sequelae of left pterional craniotomy.  There is pneumocephalus is again noted.  5 mm region of extra-axial pneumocephalus and minimal high density is relatively stable.  Brain parenchyma is unchanged.  Low-density regions in the left frontal lobe are stable.  Ventricles and sulci are appropriate for the patient's age.  No significant mass effect.  Visualized portions of paranasal sinuses are unremarkable.  Visualized portions of mastoid air cells are unremarkable.  Visualized portions of orbits are unremarkable.  IMPRESSION: No significant changes.   LOCATION:  [facility]   Dictated by (CST): Jerod Jean MD on 9/13/2024 at 8:23 PM     Finalized by (CST): Jerod Jean MD on 9/13/2024 at 8:26 PM       CT BRAIN OR HEAD (CPT=70450)    Result Date: 9/13/2024  CONCLUSION:  1. There is postoperative change from recent left craniotomy for revascularization procedure. 2. Trace pneumocephalus and hemorrhage is noted along craniotomy defect. 3. There is an evolving stroke in the left frontal lobe.    LOCATION:  [facility]   Dictated by (CST): Javan Guadalupe MD on 9/13/2024 at 3:35 PM     Finalized by (CST): Javan Guadalupe MD on 9/13/2024 at 3:41 PM       US INTRAOPERATIVE GUIDANCE (CPT=76998)    Result Date: 9/13/2024  CONCLUSION:   Sonographic guidance provided for intracranial intervention.  Please refer to dedicated procedure report for full  detail.   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 (9/3-9/5)  S/P craniotomy for left encephaloduroarteriosynangiosis   -Admit to neuro ICU   -NSGY following   -STAT CT head per NSGY- stable post op changes as compared to previous post op CT   -Hold all AC/AP until clear per NSGY   -SBP between 150-180 with an ideal target of 160 per NSGY   -Total of 2 L NS bolus to be given for new onset aphasia   -STAT 1g Keppra load   - Keppra maintenance of 500 mg BID     - Consider EEG if continued aphasia   - PT/OT/SLP when appropriate    Cardiovascular:   - SBP goal as noted above    - Hold home medications   - Evelio gtt PRN to maintain SBP goals as above    Pulmonary:        - Saturating well on RA, encourage IS     Renal:         - Monitor I&Os          - IVF while NPO   -Daily BMP     Gastrointestinal:  Nutrition:        - NPO until appropriate for bedside RN swallow screen    Infectious Disease:    - No leukocytosis, afebrile, continue to monitor     Heme/Onc          - Hb goal > 7, daily CBC    Endocrine:   - Accuchecks q6 with SSI prn      Checklist   - Lines: PIVs, subgaleal drain, indwelling funez catheter, arterial line   - DVT Prophylaxis: SCDs, hold chemical DVT prophy until clear per NSGY   - Diet: NPO until pass RN swallow screen   - IVF: NS   - Electrolytes: Replete per protocol   - Code Status: FULL    Dispo: CNICU    Discussed the above plan of care with Dr. Guzman at bedside, Dr. Stinson, and bedside RN.    Greater than 60 minutes of critical care time (exclusive of billable  procedures) was administered to manage and/or prevent neurologic instability. This involved direct patient intervention, complex decision making, and/or extensive discussions with the patient, family, and clinical staff.    ARLENE Hennessy  Neurocritical Care  [facility]  Spectra [phone]

## 2024-09-14 NOTE — OCCUPATIONAL THERAPY NOTE
OCCUPATIONAL THERAPY NEURO EVALUATION - INPATIENT     Room Number: 6620/6620-A  Evaluation Date: 9/14/2024  Type of Evaluation: Initial  Presenting Problem: Afsaneh mcclain with recent L CVA, s/p L crani and EDAS bypass 9/13    Physician Order: IP Consult to Occupational Therapy  Reason for Therapy: ADL/IADL Dysfunction and Discharge Planning    OCCUPATIONAL THERAPY ASSESSMENT   Patient is currently functioning below baseline with ADL, mobility, and RUE function. Prior to admission, patient's baseline is independent.  Patient is requiring min (A) UB ADL, max (A) LB ADL, min (A) mobility as a result of the following impairments: RUE strength/coordination, balance, communication, standing tolerance.  Occupational Therapy will continue to follow for duration of hospitalization.      Patient will benefit from continued skilled OT Services to facilitate return to prior level of function as patient demonstrates high motivation with excellent tolerance to an intensive therapy program      History Related to Current Admission: Patient is a 54 year old female admitted on 9/12/2024 with Presenting Problem: Mcclain mcclain with recent L CVA, s/p L crani and EDAS bypass 9/13. Co-Morbidities : L hemispheric stroke 9/3/2024, ADHD, vit D deficiency, mcclain mcclain          Recommendations for nursing staff:   Transfers: hand held assist  Toileting location: bedside commode  Other: Encouraged use of RUE    EVALUATION SESSION:  Patient Start of Session: semi supine, spouse and daughter present at start of session  FUNCTIONAL TRANSFER ASSESSMENT  Sit to Stand: -- (min of 2, hand held assist)    BED MOBILITY  Supine to Sit : Minimal Assist  Sit to Supine (OT): Minimal Assist    BALANCE ASSESSMENT     FUNCTIONAL ADL ASSESSMENT  LB Dressing Seated: Dependent (socks)      ACTIVITY TOLERANCE: SBP 150s for most of session, drops to high 130s via a line in standing with corresponding increase in RUE weakness. Recovers quickly in supine. Strict parameters  150-180 with goal of 160 per RN.                          O2 SATURATIONS       COGNITION  Following Commands:  follows one step commands consistently  Initiation: increased time to initiate    COGNITION ASSESSMENTS       VISION  Denies changes. Able to track in all planes wfl with no c/o dizziness. Denies diplopia.     PERCEPTION  Overall Perception Status:   appears intact    Communication: answers yes/no but occasionally answer is incorrect; unclear whether there may be a receptive component. Occasionally provides 1 word answers. Visibly frustrated with attempts at expressive communication.     Behavioral/Emotional/Social: cooperative with mild encouragement. Supportive family    UPPER EXTREMITY  ROM: within functional limits   Strength: is within functional limits except for the following;  RUE 4/5 throughout. When BP drops outside of parameters, pt's R  becomes temporarily weaker.   Coordination  Gross motor: impaired RUE  Fine motor: impaired RUE  Sensation: Light touch:  intact  Tone: normal  Shoulder joint integrity: intact    Neurological findings:  Neurological Findings: Coordination - Rapid Alternating Movement;Coordination - Finger Opposition     Coordination - Rapid Alternating Movement: Right decreased speed;Right decreased accuracy  Coordination - Finger Opposition: Right decreased speed;Right decreased accuracy       EDUCATION PROVIDED  Patient: Role of Occupational Therapy; Plan of Care; Discharge Recommendations; Functional Transfer Techniques; Fall Prevention; Posture/Positioning  Patient's Response to Education: Verbalized Understanding    Equipment used: none  Demonstrates functional use     Therapist comments: Pt's activity limited by BP outside of parameters as well as fatigue. Min (A) supine <> sit, sit to stand min of 2 with hand held assist. Supervision static/dynamic sitting balance. Assist to don socks supine. Increased time for communication.    Patient End of Session: In bed;Needs  met;Call light within reach;RN aware of session/findings;All patient questions and concerns addressed;SCDs in place    OCCUPATIONAL PROFILE    HOME SITUATION  Type of Home: House  Home Layout: Two level  Lives With: Spouse (one child living at home, second child away at school)    Toilet and Equipment: Standard height toilet  Shower/Tub and Equipment: Walk-in shower;Tub-shower combo       Occupation/Status: school PT  Hand Dominance: Right  Drives: Yes       Prior Level of Function: Pt reports independence with ADL and ambulation without AD prior to admit. Spouse reports since pt's CVA she was able to speak but continued to have a language deficit. She was able to participate in scapbooking and was independent. She had not been cleared for return to work  yet.     SUBJECTIVE   Pt expresses pain in RUE with manual BP cuff reading. Requests BP not to be taken.     PAIN ASSESSMENT  Rating: Unable to rate  Location: RUE with BP reading, incision  Management Techniques: Repositioning;Relaxation;Nurse notified    OBJECTIVE  Precautions: Drain(s) (-180)  Fall Risk: High fall risk      ASSESSMENTS  AM-PAC ‘6-Clicks’ Inpatient Daily Activity Short Form  -   Putting on and taking off regular lower body clothing?: Total  -   Bathing (including washing, rinsing, drying)?: A Lot  -   Toileting, which includes using toilet, bedpan or urinal? : Total  -   Putting on and taking off regular upper body clothing?: A Lot  -   Taking care of personal grooming such as brushing teeth?: A Little  -   Eating meals?: A Little    AM-PAC Score:  Score: 12  Approx Degree of Impairment: 66.57%  Standardized Score (AM-PAC Scale): 30.6    ADDITIONAL TESTS     NEUROLOGICAL FINDINGS  Coordination - Rapid Alternating Movement: Right decreased speed; Right decreased accuracy  Coordination - Finger Opposition: Right decreased speed; Right decreased accuracy       PLAN  OT Treatment Plan: Balance activities;ADL training;Functional transfer  training;UE strengthening/ROM;Patient/Family education;Patient/Family training;Equipment eval/education;Neuromuscluar reeducation;Compensatory technique education;Fine motor coordination activities  Rehab Potential : Good  Frequency: 3-5x/week  Number of Visits to Meet Established Goals: 8    ADL Goals   Patient will perform grooming: with supervision  Patient will perform lower body dressing:  with supervision  Patient will perform toileting: with supervision    Functional Transfer Goals  Patient will transfer from sit to supine:  with supervision  Patient will transfer from supine to sit:  with supervision  Patient will transfer from sit to stand:  with supervision  Patient will transfer to toilet:  with supervision    UE Exercise Program Goal  Patient will be supervision with right AROM HEP (home exercise program).  Patient will be supervision with right coordination HEP (home exercise program).    Additional Goals  Pt will stand 5 minutes during light ADL    Therapist Goals  Complete PhQ9 vs ADRS    Patient Evaluation Complexity Level:   Occupational Profile/Medical History HIGH - Extensive review of history including review of medical or therapy records    Specific performance deficits impacting engagement in ADL/IADL HIGH  5+ performance deficits    Client Assessment/Performance Deficits HIGH - Comorbidities and significant modifications of tasks    Clinical Decision Making HIGH - Analysis of occupational profile, comprehensive assessments, multiple treatment options    Overall Complexity HIGH     OT Session Time: 25 minutes  Self-Care Home Management:  minutes  Therapeutic Activity: 15 minutes  Neuromuscular Re-education:  minutes  Therapeutic Exercise:  minutes  Cognitive Skills:  minutes  Sensory Integrative:  minutes  Orthotic Management and Training:  minutes  Can add/delete any of these

## 2024-09-14 NOTE — PROGRESS NOTES
[facility]  Neurosurgery Progress Note    Isabella Kevin Patient Status:  Inpatient    1970 MRN [id number]   Location [facility] 6NE-A Attending Cornelio Guzman MD   Hosp Day # 2 PCP Mildred Steward MD     Chief Complaint:  S/p L EDAS for moyamoya    Subjective:  Overnight events as previously reported.  On my examination this morning she has improved right sided strength as compared overnight and is saying some words although continues to have expressive aphasia.     Objective/Physical Exam:    Vital Signs:  Blood pressure 148/73, pulse 65, temperature 99.6 °F (37.6 °C), resp. rate 12, weight 132 lb 11.5 oz (60.2 kg), SpO2 98%, not currently breastfeeding.  Neurologic:   Significant expressive aphasia will say some words intermittently  Will follow simple commands bilaterally  PERRL, EOMi  Mild right facial droop  Right upper extremity 4 out of 5 otherwise full strength  Incision clean dry and intact  FINESSE bulb with bloody output    Labs:  Recent Labs   Lab 24   RBC 3.64* 3.62* 3.61* 3.56*   HGB 11.0* 11.1* 11.1* 10.9*   HCT 32.3* 31.7* 31.4* 30.6*   MCV 88.7 87.6 87.0 86.0   MCH 30.2 30.7 30.7 30.6   MCHC 34.1 35.0 35.4 35.6   RDW 12.0 11.9 12.0 12.1   NEPRELIM 2.54 2.12 7.91*  --    WBC 4.8 4.4 8.8 10.3   .0 213.0 212.0 286.0       Recent Labs   Lab 248   GLU 92 143* 106*   BUN 9 <5* 6*   CREATSERUM 0.69 0.59 0.63   EGFRCR 104 108 106   CA 9.1 8.0* 8.4*    143 142   K 3.8 3.6 3.4*    114* 110   CO2 26.0 21.0 23.0     Assessment:  54-year-old POD#1 s/p left-sided EDAS for symptomatic moyamoya disease    I discussed with the patient and her  the current clinical situation and plan of care.  The patient's  expressed understanding agreement with the plan.    Plan:  Q1 neurochecks  Continue drain   Pain control  -180  Maintain euvolemia  EEG  pending  Continue Keppra    Case discussed with neurointensivist

## 2024-09-14 NOTE — PROCEDURES
EEG REPORT;    Reason for Examination: aphasia/ seizure    Technical Summary:   18 Channels of EEG and 1 Channel of EKG was performed utilizing internation 10/20 method.        Background Activity:   The background activity consisted of asymmetrically distributed, 7-8 Hz waveforms, right better than left side reactive to eye opening/ external stimulation.    Abnormality:  Throughout the recording moderate medium voltage polymorphic 2 to 4 Hz slow activity was noted in the left hemispheric region.  Mild 2 to 5 Hz slowing was also noted in the right hemispheric region.    Activation:    Hyperventilation:   Not Performed.    Photic Stimulation:  Driving response seen.No       Sleep:  Stage I sleep seen.     Impression:  This is a Abnormal EEG. No focal, lateralized or generalized epileptiform activity seen.   Slowing was noted in bilateral hemispheric region, left much worse than the right side probably secondary to structural abnormality in the left hemispheric region as well as diffuse encephalopathy affecting both hemispheres electrographic seizures were noted.  Clinical correlation is recommended.        Jarvis Jones MD  [facility].

## 2024-09-14 NOTE — PROGRESS NOTES
History of Presenting Illness:  Patient seen in the ICU.  57-year-old female with history of ADHD, CVA, moyamoya admitted for left EDS procedure.  Patient developed acute aphasia yesterday night after procedure.  Since then patient has been having speech difficulties.  Intermittently patient also had mild right upper extremity weakness with hypotension.  Patient's blood pressure was maintained initially with IV fluid bolus.  Neurosurgery/neurocritical care evaluated patient yesterday.  Patient currently still aphasic.  No witnessed seizures.      Vitals:   Vitals:    09/14/24 1200   BP: (!) 162/72   Pulse: 68   Resp: 14   Temp: 99.9 °F (37.7 °C)          Examination:    Awake , alert, has expressive speech difficulties.  Receptive language functions normal.     Pupils round and reactive to light, extra ocular movement normal, right facial weakness, hearing normal.  Motor strength decreased on the right side.      Investigations:    CT BRAIN OR HEAD (CPT=70450)    Result Date: 9/13/2024  PROCEDURE:  CT BRAIN OR HEAD (66717)  COMPARISON:  [facility] , CT, CT BRAIN OR HEAD (14522), 9/13/2024, 3:25 PM.  INDICATIONS:  aphasia  TECHNIQUE:  Noncontrast CT scanning is performed through the brain. Dose reduction techniques were used. Dose information is transmitted to the ACR (American College of Radiology) NRDR (National Radiology Data Registry) which includes the Dose Index Registry.  PATIENT STATED HISTORY: (As transcribed by Technologist)  Post-op.    FINDINGS: Sequelae of left pterional craniotomy.  There is pneumocephalus is again noted.  5 mm region of extra-axial pneumocephalus and minimal high density is relatively stable.  Brain parenchyma is unchanged.  Low-density regions in the left frontal lobe are stable.  Ventricles and sulci are appropriate for the patient's age.  No significant mass effect.  Visualized portions of paranasal sinuses are unremarkable.  Visualized portions of mastoid air cells are unremarkable.   Visualized portions of orbits are unremarkable.  IMPRESSION: No significant changes.   LOCATION:  [facility]   Dictated by (CST): Jerod Jean MD on 9/13/2024 at 8:23 PM     Finalized by (CST): Jerod Jean MD on 9/13/2024 at 8:26 PM       CT BRAIN OR HEAD (CPT=70450)    Result Date: 9/13/2024  CONCLUSION:  1. There is postoperative change from recent left craniotomy for revascularization procedure. 2. Trace pneumocephalus and hemorrhage is noted along craniotomy defect. 3. There is an evolving stroke in the left frontal lobe.    LOCATION:  [facility]   Dictated by (CST): Javan Guadalupe MD on 9/13/2024 at 3:35 PM     Finalized by (CST): Javan Guadalupe MD on 9/13/2024 at 3:41 PM       US INTRAOPERATIVE GUIDANCE (CPT=76998)    Result Date: 9/13/2024  CONCLUSION:   Sonographic guidance provided for intracranial intervention.  Please refer to dedicated procedure report for full detail.   LOCATION:  [facility]    Dictated by (CST): Mariam Samuel MD on 9/13/2024 at 10:34 AM     Finalized by (CST): Mariam Samuel MD on 9/13/2024 at 10:35 AM            Lab Results   Component Value Date    A1C 5.5 08/28/2024        Lab Results   Component Value Date     (H) 08/28/2024    HDL 47 08/28/2024    TRIG 104 08/28/2024        Recent Labs   Lab 09/12/24  1942 09/13/24  0508 09/13/24  1617 09/14/24  0418   RBC 3.64* 3.62* 3.61* 3.56*   HGB 11.0* 11.1* 11.1* 10.9*   HCT 32.3* 31.7* 31.4* 30.6*   MCV 88.7 87.6 87.0 86.0   MCH 30.2 30.7 30.7 30.6   MCHC 34.1 35.0 35.4 35.6   RDW 12.0 11.9 12.0 12.1   NEPRELIM 2.54 2.12 7.91*  --    WBC 4.8 4.4 8.8 10.3   .0 213.0 212.0 286.0       Recent Labs   Lab 09/13/24  0508 09/13/24  1617 09/14/24  0418   GLU 92 143* 106*   BUN 9 <5* 6*   CREATSERUM 0.69 0.59 0.63   EGFRCR 104 108 106   CA 9.1 8.0* 8.4*    143 142   K 3.8 3.6 3.4*    114* 110   CO2 26.0 21.0 23.0       Impression/MDM.    Shelby Shelby Disease complicated by left hemispheric stroke (9/3-9/5)  S/P craniotomy for left  encephaloduroarteriosynangiosis          -NSGY following         -Patient still aphasic.  Speech evaluation.         -SBP between 150-180 with an ideal target of 160 per NSGY         -STAT 1g Keppra load         - Keppra maintenance of 500 mg BID           - EEG.         - PT/OT/SLP when appropriate.  Neurocheck.       Cardiovascular:         - SBP goal as noted above          - Hold home medications         - Evelio gtt PRN to maintain SBP goals as above     Pulmonary:        - Saturating well on RA, encourage IS      Renal:         - Monitor I&Os          - IVF while NPO         -Daily BMP      Gastrointestinal:  Nutrition:        - NPO until appropriate for bedside RN swallow screen     Infectious Disease:          - No leukocytosis, afebrile, continue to monitor      Heme/Onc          - Hb goal > 7, daily CBC     Endocrine:         - Accuchecks q6 with SSI prn            Checklist         - Lines: PIVs, subgaleal drain, indwelling funez catheter, arterial line         - DVT Prophylaxis: SCDs, hold chemical DVT prophy until clear per NSGY         - Diet: NPO until pass RN swallow screen         - IVF: NS         - Electrolytes: Replete per protocol         - Code Status: FULL        Greater than 40 minutes of critical care time (exclusive of billable procedures) was administered to manage and/or prevent neurologic instability. This involved direct patient intervention, complex decision making, and/or extensive discussions with the patient, family, and clinical staff.         Jarvis Jones MD,   [facility] [place]

## 2024-09-14 NOTE — PLAN OF CARE
Assumed care at around 1930. Pt A&O x4  following commands appropriately but aphasic, and unable to speak. Neurosx aware. Stat CT ordered. Pt given keppra, IVF bolus, and brandon gtt to maintain sbp 150-180. Pt Q1 Neuro, see flowsheets for full assessment. Brown in place and intact. L head incision w/ staples, D/I, and open to air. FINESSE to bulb suction w/ bloody output.     Around 0100 pt more awake and alert able to tell me location, name, and place. Pt asking \"what happened?\" Neuro apn at bedside and aware. Pt c/o pain to L head prn morphine given.

## 2024-09-14 NOTE — PROGRESS NOTES
Neurosurgery Progress Note    Notified that at 7PM neurological exam, the patient was no longer speaking, although continued to follow commands and had symmetric strength per nursing. SBP was in the 120s by arterial line. Dr. Stinson (neurointensivist) recommended a 500cc bolus of fluids. Thereafter, upon my examination, she was non-verbal, although following simples commands with moderate right sided neglect, although good strength when prompted. Her SBP was in the 130s at that time.  I accompanied her to a stat CT, which demonstrated no compressive hematoma or other significant changes to the immediate post-operative scan.  I discussed the situation with Dr. Stinson and we decided to administer another fluid bolus, increase her blood pressure to around 160 systolic, and giving another dose of keppra.  I spoke with the patient's  about the current clinical situation and plan of care. He expressed understanding.  Updated overnight neurocritical care APN on the plan of care as discussed with Dr. Stinson.

## 2024-09-15 NOTE — PROGRESS NOTES
[facility]   NEUROCRITICAL CARE   PROGRESS NOTE    Admission date: 9/12/2024  Reason for Consult: Post op EDAS  Chief Complaint: Recent stroke  ________________________________________________________________    SUBJECTIVE     24 Hour Significant Events: No further worsening of symptoms overnight. Slightly better this AM per  at bedside.     OBJECTIVE   VITAL SIGNS:   Temp:  [97.1 °F (36.2 °C)-99.9 °F (37.7 °C)] 98.8 °F (37.1 °C)  Pulse:  [53-70] 61  Resp:  [9-21] 13  BP: (128-171)/(57-80) 146/63  SpO2:  [97 %-100 %] 99 %  AO: (158-177)/(71-81) 164/71    INTAKE/OUTPUT:  Intake/Output Summary (Last 24 hours) at 9/15/2024 1029  Last data filed at 9/15/2024 0800  Gross per 24 hour   Intake 3569.9 ml   Output 4927.5 ml   Net -1357.6 ml     PHYSICAL EXAM:  GENERAL APPEARANCE: Patient resting comfortably in bed, NAD  HEENT: Normocephalic, atraumatic. Drain in place   LUNGS: Normal respiratory rate and effort   HEART: Regular rate and rhythm   ABDOMEN: Soft. No tenderness, guarding, or rebound.     NEUROLOGIC:    Mental Status:  A&O x 4, Follows simple commands, slow mildly dysarthric speech, fragmented word output, able to name and repeat this AM  Cranial nerves: PERRL.  Visual fields full.  EOMI.  R droop.  Hearing grossly intact. Tongue midline   Motor: Drift:  Absent; Motor exam is 5 out of 5 in all extremities bilaterally except 4+/5 in RUE   Sensation: Intact to light touch bilaterally  Cerebellar: Normal Finger-To-Nose test    LABORATORY DATA:  Last 24 hour labs were reviewed in detail.  Recent Labs   Lab 09/13/24  1617 09/14/24  0418 09/15/24  0406    142 140   K 3.6 3.4* 3.6  3.6   * 110 106   CO2 21.0 23.0 28.0   * 106* 101*   BUN <5* 6* 7*   CREATSERUM 0.59 0.63 0.64     Recent Labs   Lab 09/13/24  1617 09/14/24  0418 09/15/24  0407   WBC 8.8 10.3 10.2   HGB 11.1* 10.9* 11.0*   .0 286.0 289.0     Recent Labs   Lab 09/13/24  0508 09/13/24  1617   ALT 17 15    AST 13 15     Recent Labs   Lab 09/14/24  0418 09/15/24  0406   MG 1.7 2.1       Scheduled Meds:   atorvastatin  40 mg Oral Nightly    heparin  5,000 Units Subcutaneous 2 times per day    levETIRAcetam  500 mg Intravenous Q12H    aspirin  325 mg Oral Daily    [Transfer Hold] vortioxetine  10 mg Oral Nightly    [Transfer Hold] progesterone  100 mg Oral Nightly     Continuous Infusions:   sodium chloride 125 mL/hr at 09/14/24 2029    phenylephrine 120 mcg/min (09/15/24 1020)     PRN Meds:  ondansetron    HYDROcodone-acetaminophen **OR** HYDROcodone-acetaminophen    morphINE    [Transfer Hold] acetaminophen    [Transfer Hold] polyethylene glycol (PEG 3350)    [Transfer Hold] sennosides    [Transfer Hold] bisacodyl    [Transfer Hold] fleet enema    Radiology:    No results found.  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 (9/3-9/5)  S/P craniotomy for left encephaloduroarteriosynangiosis         - Post op CT w/ expected post op changes          - C/b post op worsening of aphasia and droop, repeat CT stable          - EEG 9/14 with no focal, lateralized or generalized epileptiform discharges or seizures noted          - Continue asa and statin          - SBP between 150-180  per nsg         - Continue Keppra 500 mg BID x7 days          - PT/OT/SLP when appropriate     Cardiovascular:         - SBP goal as noted above          - Hold home medications         - Evelio gtt PRN to maintain SBP goals as above     Pulmonary: - Saturating well on RA, encourage IS      Renal:         - Monitor I&Os           -Daily BMP     Hypokalemia - K being replaced this AM      Gastrointestinal:  Nutrition:        - ADAT, bowel  regimen as needed     Infectious Disease: - No leukocytosis, afebrile, continue to monitor      Heme/Onc   Anemia - Hb goal > 7, daily CBC     Endocrine: - Accuchecks q6 with SSI prn            Checklist         - Lines: PIVs, subgaleal drain, funez - remove, arterial line         - DVT Prophylaxis: SCDs, HSQ         - Diet: ADAT         - IVF: NS - Dc         - Electrolytes: Replete per protocol         - Code Status: FULL     Dispo: CNICU      Greater than 40 minutes of critical care time (exclusive of billable procedures) was administered to manage and/or prevent neurologic instability. This involved direct patient intervention, complex decision making, and/or extensive discussions with the patient, family, and clinical staff.    Dustin Dillard MD  Neurocritical Care  [facility]

## 2024-09-15 NOTE — PLAN OF CARE
Assumed care at around 1930. Q1 neuro checks. Pt w/ expressive apahsia, able to follow command/answer yes/no questions appropriately, and R sided weakness see flowsheets for full assessment. No complaints of pain. Evelio gtt infusing to maintain -180. Voiding per funez. Patient and  updated on plan of care. Call light within reach.         Problem: Patient/Family Goals  Goal: Patient/Family Long Term Goal  Description: Patient's Long Term Goal: Stay out of hospital    Interventions:  - Follow up with PCP after discharge  - Take medication as prescribed  - See additional Care Plan goals for specific interventions  Outcome: Progressing  Goal: Patient/Family Short Term Goal  Description: Patient's Short Term Goal: Feel better and go home    Interventions:   - Test/procedure ordered  - Monitor labs  - See additional Care Plan goals for specific interventions  Outcome: Progressing     Problem: NEUROLOGICAL - ADULT  Goal: Achieves stable or improved neurological status  Description: INTERVENTIONS  - Assess for and report changes in neurological status  - Initiate measures to prevent increased intracranial pressure  - Maintain blood pressure and fluid volume within ordered parameters to optimize cerebral perfusion and minimize risk of hemorrhage  - Monitor temperature, glucose, and sodium. Initiate appropriate interventions as ordered  Outcome: Progressing  Goal: Achieves maximal functionality and self care  Description: INTERVENTIONS  - Monitor swallowing and airway patency with patient fatigue and changes in neurological status  - Encourage and assist patient to increase activity and self care with guidance from PT/OT  - Encourage visually impaired, hearing impaired and aphasic patients to use assistive/communication devices  Outcome: Progressing  Goal: Absence of seizures  Description: INTERVENTIONS  - Monitor for seizure activity  - Administer anti-seizure medications as ordered  - Monitor neurological  status  Outcome: Progressing  Goal: Remains free of injury related to seizure activity  Description: INTERVENTIONS:  - Maintain airway, patient safety  and administer oxygen as ordered  - Monitor patient for seizure activity, document and report duration and description of seizure to MD/LIP  - If seizure occurs, turn patient to side and suction secretions as needed  - Reorient patient post seizure  - Seizure pads on all 4 side rails  - Instruct patient/family to notify RN of any seizure activity  - Instruct patient/family to call for assistance with activity based on assessment  Outcome: Progressing     Problem: PAIN - ADULT  Goal: Verbalizes/displays adequate comfort level or patient's stated pain goal  Description: INTERVENTIONS:  - Encourage pt to monitor pain and request assistance  - Assess pain using appropriate pain scale  - Administer analgesics based on type and severity of pain and evaluate response  - Implement non-pharmacological measures as appropriate and evaluate response  - Consider cultural and social influences on pain and pain management  - Manage/alleviate anxiety  - Utilize distraction and/or relaxation techniques  - Monitor for opioid side effects  - Notify MD/LIP if interventions unsuccessful or patient reports new pain  - Anticipate increased pain with activity and pre-medicate as appropriate  Outcome: Progressing

## 2024-09-15 NOTE — PROGRESS NOTES
[facility] [place]     CC: Hospital Follow up    PCP: Mildred Steward MD       Assessment/Plan:     Active Problems:    Cerebrovascular accident (HCC)    Moyamoya    Moyamoya disease        Patient is a 54 year old female with PMH sig for ADHD who presented for elective craniotomy for moyamoya disease.  She is status post EDAS.    Recent CVA  Moyamoya disease  Status post EDAS, POD 1  - Neurochecks  - Pain management per neurosurgery  - PT/OT/speech evaluation-stable  - Brown to be maintained  - BP parameters systolic 120-180, needed brandon for BP management  -Keppra 500 mgTwice daily  -Neurosurgery and neurointensivist following    R sided facial droop  R sided weakness- improved  - frequent neurochecks,   - adequate BP control  - Speech eval    ADHD  - Currently hold home meds      Prophylaxis:   DVT with scd, heparin subcu  Atrophy Prophylaxis PT/OT  Lines/Monitors:     Dispo: CCU  Code status: Full    Patient and/or patient's family given opportunity to ask questions and note understanding and agreeing with therapeutic plan as outlined    Thank you for allowing me to participate in the care of this patient.     Thank You,  Stefani Oden DO    [facility] [place]  Pager   Answering Service number: [phone]       Subjective:     BP was low prior to parameters, needed vasopressors overnight.  Earlier in the morning, had some issues with right-sided weakness however throughout the day she continued to improve.  However still with right-sided facial droop.  Speech therapy at bedside for evaluation.    OBJECTIVE:    Blood pressure 153/63, pulse 60, temperature 98.8 °F (37.1 °C), temperature source Temporal, resp. rate 21, weight 133 lb 2.5 oz (60.4 kg), SpO2 98%, not currently breastfeeding.    Temp:  [97.1 °F (36.2 °C)-99.9 °F (37.7 °C)] 98.8 °F (37.1 °C)  Pulse:  [53-70] 60  Resp:  [9-21] 21  BP: (128-162)/(57-80) 153/63  SpO2:  [97 %-100 %] 98 %  AO: (158-177)/(71-81)  170/74      Intake/Output:    Intake/Output Summary (Last 24 hours) at 9/15/2024 1006  Last data filed at 9/15/2024 0800  Gross per 24 hour   Intake 3569.9 ml   Output 4927.5 ml   Net -1357.6 ml       Last 3 Weights   09/15/24 0400 133 lb 2.5 oz (60.4 kg)   09/14/24 0444 132 lb 11.5 oz (60.2 kg)   09/12/24 1944 134 lb 14.7 oz (61.2 kg)   09/12/24 1926 134 lb 14.7 oz (61.2 kg)   09/04/24 0600 134 lb 3.2 oz (60.9 kg)   08/30/24 0928 132 lb (59.9 kg)   08/28/24 0213 133 lb 9.6 oz (60.6 kg)   08/28/24 0150 133 lb 8 oz (60.6 kg)   08/27/24 1840 132 lb (59.9 kg)       Exam   Gen: No acute distress, alert and oriented x3, no focal neurologic deficits  Heent: Surgical staples intact, clean and dry, drain in place with serosanguineous output,  Pulm: Lungs clear, normal respiratory effort  CV: Heart with regular rate and rhythm, no peripheral edema  Abd: Abdomen soft, nontender, nondistended, bowel sounds present  MSK: Full range of motion in extremities, no clubbing, no cyanosis  Skin: no rashes or lesions  Neuro: AO*3, right-sided facial droop, otherwise, normal exam is grossly intact  Psyc: appropriate mood and affect      Data Review:       Labs:     Recent Labs   Lab 09/12/24  1942 09/13/24  0508 09/13/24  1617 09/14/24  0418 09/15/24  0407   RBC 3.64* 3.62* 3.61* 3.56* 3.64*   HGB 11.0* 11.1* 11.1* 10.9* 11.0*   HCT 32.3* 31.7* 31.4* 30.6* 32.8*   MCV 88.7 87.6 87.0 86.0 90.1   MCH 30.2 30.7 30.7 30.6 30.2   MCHC 34.1 35.0 35.4 35.6 33.5   RDW 12.0 11.9 12.0 12.1 12.3   NEPRELIM 2.54 2.12 7.91*  --   --    WBC 4.8 4.4 8.8 10.3 10.2   .0 213.0 212.0 286.0 289.0         Recent Labs   Lab 09/13/24  1617 09/14/24  0418 09/15/24  0406   * 106* 101*   BUN <5* 6* 7*   CREATSERUM 0.59 0.63 0.64   EGFRCR 108 106 106   CA 8.0* 8.4* 9.0    142 140   K 3.6 3.4* 3.6  3.6   * 110 106   CO2 21.0 23.0 28.0       Recent Labs   Lab 09/13/24  0508 09/13/24  1617   ALT 17 15   AST 13 15   ALB 4.1 3.9          Imaging:  CT BRAIN OR HEAD (CPT=70450)    Result Date: 9/13/2024  PROCEDURE:  CT BRAIN OR HEAD ([id number])  COMPARISON:  [facility] , CT, CT BRAIN OR HEAD ([id number]), 9/13/2024, 3:25 PM.  INDICATIONS:  aphasia  TECHNIQUE:  Noncontrast CT scanning is performed through the brain. Dose reduction techniques were used. Dose information is transmitted to the ACR (American College of Radiology) NRDR (National Radiology Data Registry) which includes the Dose Index Registry.  PATIENT STATED HISTORY: (As transcribed by Technologist)  Post-op.    FINDINGS: Sequelae of left pterional craniotomy.  There is pneumocephalus is again noted.  5 mm region of extra-axial pneumocephalus and minimal high density is relatively stable.  Brain parenchyma is unchanged.  Low-density regions in the left frontal lobe are stable.  Ventricles and sulci are appropriate for the patient's age.  No significant mass effect.  Visualized portions of paranasal sinuses are unremarkable.  Visualized portions of mastoid air cells are unremarkable.  Visualized portions of orbits are unremarkable.  IMPRESSION: No significant changes.   LOCATION:  [facility]   Dictated by (CST): Jerod Jean MD on 9/13/2024 at 8:23 PM     Finalized by (CST): Jerod Jean MD on 9/13/2024 at 8:26 PM       CT BRAIN OR HEAD (CPT=70450)    Result Date: 9/13/2024  CONCLUSION:  1. There is postoperative change from recent left craniotomy for revascularization procedure. 2. Trace pneumocephalus and hemorrhage is noted along craniotomy defect. 3. There is an evolving stroke in the left frontal lobe.    LOCATION:  [facility]   Dictated by (CST): Javan Guadalupe MD on 9/13/2024 at 3:35 PM     Finalized by (CST): Javan Guadalupe MD on 9/13/2024 at 3:41 PM       US INTRAOPERATIVE GUIDANCE (CPT=76998)    Result Date: 9/13/2024  CONCLUSION:   Sonographic guidance provided for intracranial intervention.  Please refer to dedicated procedure report for full detail.   LOCATION:  [facility]    Dictated  by (CST): Mariam Samuel MD on 9/13/2024 at 10:34 AM     Finalized by (CST): Mariam Samuel MD on 9/13/2024 at 10:35 AM          Meds:      atorvastatin  40 mg Oral Nightly    heparin  5,000 Units Subcutaneous 2 times per day    levETIRAcetam  500 mg Intravenous Q12H    aspirin  325 mg Oral Daily    [Transfer Hold] vortioxetine  10 mg Oral Nightly    [Transfer Hold] progesterone  100 mg Oral Nightly      sodium chloride 125 mL/hr at 09/14/24 2029    phenylephrine 70 mcg/min (09/15/24 0945)       ondansetron    HYDROcodone-acetaminophen **OR** HYDROcodone-acetaminophen    morphINE    [Transfer Hold] acetaminophen    [Transfer Hold] polyethylene glycol (PEG 3350)    [Transfer Hold] sennosides    [Transfer Hold] bisacodyl    [Transfer Hold] fleet enema

## 2024-09-15 NOTE — PROGRESS NOTES
[facility]  Neurosurgery Progress Note    Isabella Kevin Patient Status:  Inpatient    1970 MRN [id number]   Location [facility] 6NE-A Attending Cornelio Guzman MD   Hosp Day # 3 PCP Mildred Steward MD     Chief Complaint:  S/p L EDAS for moyamoya    Subjective:  Mild improvement in language this morning and stronger in the right arm. EEG yesterday without seizures.    Objective/Physical Exam:    Vital Signs:  Blood pressure 137/71, pulse 71, temperature 98.1 °F (36.7 °C), temperature source Temporal, resp. rate 18, weight 133 lb 2.5 oz (60.4 kg), SpO2 99%, not currently breastfeeding.  Neurologic:   Significant expressive aphasia will say some words intermittently  Will follow simple commands bilaterally  PERRL, EOMi  Mild right facial droop  Right upper extremity 4+ out of 5 otherwise full strength  Incision clean dry and intact  FINESSE bulb with bloody output    Labs:  Recent Labs   Lab 09/12/24  1942 09/13/24  0508 09/13/24  1617 09/14/24  0418 09/15/24  0407   RBC 3.64* 3.62* 3.61* 3.56* 3.64*   HGB 11.0* 11.1* 11.1* 10.9* 11.0*   HCT 32.3* 31.7* 31.4* 30.6* 32.8*   MCV 88.7 87.6 87.0 86.0 90.1   MCH 30.2 30.7 30.7 30.6 30.2   MCHC 34.1 35.0 35.4 35.6 33.5   RDW 12.0 11.9 12.0 12.1 12.3   NEPRELIM 2.54 2.12 7.91*  --   --    WBC 4.8 4.4 8.8 10.3 10.2   .0 213.0 212.0 286.0 289.0       Recent Labs   Lab 09/13/24  1617 09/14/24  0418 09/15/24  0406   * 106* 101*   BUN <5* 6* 7*   CREATSERUM 0.59 0.63 0.64   EGFRCR 108 106 106   CA 8.0* 8.4* 9.0    142 140   K 3.6 3.4* 3.6  3.6   * 110 106   CO2 21.0 23.0 28.0     Assessment:  54-year-old POD#2 s/p left-sided EDAS for symptomatic moyamoya disease    Plan:  Q2' neurochecks  Continue drain   Pain control  -180  Maintain euvolemia  Continue Keppra    Case discussed with neurointensivist

## 2024-09-15 NOTE — PROGRESS NOTES
[facility] Hospitalist Progress Note     CC: Hospital Follow up    PCP: Mildred Steward MD       Assessment/Plan:     Active Problems:    Cerebrovascular accident (HCC)    Moyamoya    Moyamoya disease    Aphasia    Facial droop    Hypokalemia    Anemia        Patient is a 54 year old female with PMH sig for ADHD who presented for elective craniotomy for moyamoya disease.  She is status post EDAS.    Recent CVA  Moyamoya disease  Status post EDAS, POD 2  - Neurochecks  - Pain management per neurosurgery  - PT/OT/speech evaluation-stable  - Brown to be maintained  - BP parameters systolic 120-180, needed brandon for BP management  -Keppra 500 mgTwice daily  -Neurosurgery and neurointensivist following    R sided facial droop  R sided weakness- improved  - frequent neurochecks,   - adequate BP control  - Speech eval    Bradycardia  - noted on tele, patient asymptomatic  - pt has \"erratic HR\" per family, likely high in the setting of adderall  - obtain EKG  - telemetry    ADHD  - Currently hold home meds      Prophylaxis:   DVT with scd, heparin subcu  Atrophy Prophylaxis PT/OT  Lines/Monitors:     Dispo: CCU  Code status: Full    Patient and/or patient's family given opportunity to ask questions and note understanding and agreeing with therapeutic plan as outlined    Thank you for allowing me to participate in the care of this patient.     Thank You,  Stefani Oden DO    [facility] Hospitalist  Pager   Answering Service number: [phone]       Subjective:     BP was low prior to parameters, still on brandon but decreasing requirements. Eating.   Tele with daniel.   at bedside. Feels better. Talking a little bit more.    OBJECTIVE:    Blood pressure (!) 167/65, pulse (!) 48, temperature 98.8 °F (37.1 °C), temperature source Temporal, resp. rate 10, weight 133 lb 2.5 oz (60.4 kg), SpO2 99%, not currently breastfeeding.    Temp:  [97.1 °F (36.2 °C)-99.2 °F (37.3 °C)] 98.8 °F (37.1 °C)  Pulse:  [48-69] 48  Resp:  [9-21] 10  BP:  (128-171)/(57-75) 167/65  SpO2:  [97 %-100 %] 99 %  AO: (158-180)/(70-80) 175/72      Intake/Output:    Intake/Output Summary (Last 24 hours) at 9/15/2024 1201  Last data filed at 9/15/2024 1130  Gross per 24 hour   Intake 3238.9 ml   Output 4982.5 ml   Net -1743.6 ml       Last 3 Weights   09/15/24 0400 133 lb 2.5 oz (60.4 kg)   09/14/24 0444 132 lb 11.5 oz (60.2 kg)   09/12/24 1944 134 lb 14.7 oz (61.2 kg)   09/12/24 1926 134 lb 14.7 oz (61.2 kg)   09/04/24 0600 134 lb 3.2 oz (60.9 kg)   08/30/24 0928 132 lb (59.9 kg)   08/28/24 0213 133 lb 9.6 oz (60.6 kg)   08/28/24 0150 133 lb 8 oz (60.6 kg)   08/27/24 1840 132 lb (59.9 kg)       Exam   Gen: No acute distress, alert and oriented x3, no focal neurologic deficits  Heent: Surgical staples intact, clean and dry, drain in place with serosanguineous output,  Pulm: Lungs clear, normal respiratory effort  CV: Heart with regular rate and rhythm, no peripheral edema  Abd: Abdomen soft, nontender, nondistended, bowel sounds present  MSK: Full range of motion in extremities, no clubbing, no cyanosis  Skin: no rashes or lesions  Neuro: AO*3, right-sided facial droop, otherwise, neuro exam is grossly intact, still not talking as much.  Psyc: appropriate mood and affect      Data Review:       Labs:     Recent Labs   Lab 09/12/24  1942 09/13/24  0508 09/13/24  1617 09/14/24  0418 09/15/24  0407   RBC 3.64* 3.62* 3.61* 3.56* 3.64*   HGB 11.0* 11.1* 11.1* 10.9* 11.0*   HCT 32.3* 31.7* 31.4* 30.6* 32.8*   MCV 88.7 87.6 87.0 86.0 90.1   MCH 30.2 30.7 30.7 30.6 30.2   MCHC 34.1 35.0 35.4 35.6 33.5   RDW 12.0 11.9 12.0 12.1 12.3   NEPRELIM 2.54 2.12 7.91*  --   --    WBC 4.8 4.4 8.8 10.3 10.2   .0 213.0 212.0 286.0 289.0         Recent Labs   Lab 09/13/24  1617 09/14/24  0418 09/15/24  0406   * 106* 101*   BUN <5* 6* 7*   CREATSERUM 0.59 0.63 0.64   EGFRCR 108 106 106   CA 8.0* 8.4* 9.0    142 140   K 3.6 3.4* 3.6  3.6   * 110 106   CO2 21.0 23.0 28.0        Recent Labs   Lab 09/13/24  0508 09/13/24  1617   ALT 17 15   AST 13 15   ALB 4.1 3.9         Imaging:  CT BRAIN OR HEAD (CPT=70450)    Result Date: 9/13/2024  PROCEDURE:  CT BRAIN OR HEAD (45078)  COMPARISON:  [facility] , CT, CT BRAIN OR HEAD (49675), 9/13/2024, 3:25 PM.  INDICATIONS:  aphasia  TECHNIQUE:  Noncontrast CT scanning is performed through the brain. Dose reduction techniques were used. Dose information is transmitted to the ACR (American College of Radiology) NRDR (National Radiology Data Registry) which includes the Dose Index Registry.  PATIENT STATED HISTORY: (As transcribed by Technologist)  Post-op.    FINDINGS: Sequelae of left pterional craniotomy.  There is pneumocephalus is again noted.  5 mm region of extra-axial pneumocephalus and minimal high density is relatively stable.  Brain parenchyma is unchanged.  Low-density regions in the left frontal lobe are stable.  Ventricles and sulci are appropriate for the patient's age.  No significant mass effect.  Visualized portions of paranasal sinuses are unremarkable.  Visualized portions of mastoid air cells are unremarkable.  Visualized portions of orbits are unremarkable.  IMPRESSION: No significant changes.   LOCATION:  [facility]   Dictated by (CST): Jerod Jean MD on 9/13/2024 at 8:23 PM     Finalized by (CST): Jerod Jean MD on 9/13/2024 at 8:26 PM       CT BRAIN OR HEAD (CPT=70450)    Result Date: 9/13/2024  CONCLUSION:  1. There is postoperative change from recent left craniotomy for revascularization procedure. 2. Trace pneumocephalus and hemorrhage is noted along craniotomy defect. 3. There is an evolving stroke in the left frontal lobe.    LOCATION:  [facility]   Dictated by (CST): Javan Guadalupe MD on 9/13/2024 at 3:35 PM     Finalized by (CST): Javan Guadalupe MD on 9/13/2024 at 3:41 PM       US INTRAOPERATIVE GUIDANCE (CPT=76998)    Result Date: 9/13/2024  CONCLUSION:   Sonographic guidance provided for intracranial intervention.   Please refer to dedicated procedure report for full detail.   LOCATION:  [facility]    Dictated by (CST): Mariam Samuel MD on 9/13/2024 at 10:34 AM     Finalized by (CST): Mariam Samuel MD on 9/13/2024 at 10:35 AM          Meds:      atorvastatin  40 mg Oral Nightly    heparin  5,000 Units Subcutaneous 2 times per day    levETIRAcetam  500 mg Intravenous Q12H    aspirin  325 mg Oral Daily    [Transfer Hold] vortioxetine  10 mg Oral Nightly    [Transfer Hold] progesterone  100 mg Oral Nightly      sodium chloride 125 mL/hr at 09/14/24 2029    phenylephrine 80 mcg/min (09/15/24 1110)       ondansetron    HYDROcodone-acetaminophen **OR** HYDROcodone-acetaminophen    morphINE    [Transfer Hold] acetaminophen    [Transfer Hold] polyethylene glycol (PEG 3350)    [Transfer Hold] sennosides    [Transfer Hold] bisacodyl    [Transfer Hold] fleet enema

## 2024-09-16 NOTE — PHYSICAL THERAPY NOTE
PHYSICAL THERAPY TREATMENT NOTE - INPATIENT    Room Number: 6620/6620-A     Session: 1     Number of Visits to Meet Established Goals: 4  History related to current admission: Patient is a 54 year old female admitted on 9/12/2024 from home for L crani.  Pt with recent L hemispheric stroke on 9/3, discharged home on 9/5.  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  Post-op with aphasia and R UE weakness.  CTH stable, SBP to be between 150-180        HOME SITUATION  Type of Home: House   Home Layout: Two level     Lives With: Spouse (one child living at home, second child away at school)  Drives: Yes     Prior Level of [place]: per  at b/s: community indep; after recent stroke, pt indep, did not return to work, however was busy with scrapbooking activities; reported deficits with slow speech, declines difficulty with word finding  Presenting Problem: moyamoya  Co-Morbidities : L hemispheric stroke 9/3/2024, ADHD, vit D deficiency, mcclain mcclain    ASSESSMENT   Patient demonstrates good  progress this session, goals remain in progress. Able to participate in gait training in the humphrey. No LE strength deficits demonstrated. Dynamic balance WNL per testing.     Patient continues to function near baseline with bed mobility, transfers, gait, and stair negotiation. Contributing factors to remaining limitations include decreased endurance/aerobic capacity and medical status.  Next session anticipate patient to progress gait and stair negotiation.  Physical Therapy will continue to follow patient for duration of hospitalization.    Patient continues to benefit from continued skilled PT services: for duration of hospitalization, however, given the patient is functioning near baseline level do not anticipate skilled therapy needs at discharge .     PLAN  PT Treatment Plan: Patient education;Gait training;Neuromuscular re-educate;Transfer training;Stair training;Balance training  Rehab Potential :  Good  Frequency (Obs):  (1-3x per week)    CURRENT GOALS     Goal #1 Patient is able to demonstrate supine - sit EOB @ level: independent      Goal #2 Patient is able to demonstrate transfers EOB to/from Chair/Wheelchair at assistance level: modified independent      Goal #3 Patient is able to ambulate 200 feet with assist device: none at assistance level: supervision      Goal #4     Goal #5     Goal #6     Goal Comments: Goals established on 9/14/2024 9/16/2024 all goals ongoing    SUBJECTIVE  \"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 and that her spouse will be able to assist at dc    OBJECTIVE  Precautions: Drain(s) (-180)    WEIGHT BEARING RESTRICTION                   PAIN ASSESSMENT   Rating: Unable to rate  Location: L incisional/neck  Management Techniques: Activity promotion;Body mechanics;Repositioning    BALANCE                                                                                                                       Static Sitting: Good  Dynamic Sitting: Good           Static Standing: Good  Dynamic Standing: Good    ACTIVITY TOLERANCE                         O2 WALK         AM-PAC '6-Clicks' INPATIENT SHORT FORM - BASIC MOBILITY  How much difficulty does the patient currently have...  Patient Difficulty: Turning over in bed (including adjusting bedclothes, sheets and blankets)?: None   Patient Difficulty: Sitting down on and standing up from a chair with arms (e.g., wheelchair, bedside commode, etc.): None   Patient Difficulty: Moving from lying on back to sitting on the side of the bed?: None   How much help from another person does the patient currently need...   Help from Another: Moving to and from a bed to a chair (including a wheelchair)?: None   Help from Another: Need to walk in hospital room?: None   Help from Another: Climbing 3-5 steps with a railing?: A Little       AM-PAC Score:  Raw Score: 23   Approx Degree of Impairment: 11.2%   Standardized Score (AM-PAC Scale): 56.93   CMS Modifier  (G-Code): CI    FUNCTIONAL ABILITY STATUS  Gait Assessment   Functional Mobility/Gait Assessment  Gait Assistance: Supervision  Distance (ft): 15,150  Assistive Device: None  Pattern: Within Functional Limits (slow jay)    Skilled Therapy Provided    Bed Mobility:  Rolling: indep  Supine<>Sit: indep     Transfer Mobility:  Sit<>Stand: SBA   Stand<>Sit: SBA   Gait: SBA    Therapist's Comments: pt educated on EC and pacing techniques, recommend cont ambulation throughout the day with staff, pt agreeable     Modified Portillo Balance Test    1. Sitting to standing                                                                   4  2. Standing unsupported with eyes closed                                4  3. Reaching forward with outstretched arm while standing       4  4.  object from the floor from a standing position           3  5. Turning to look over left and right shoulders while standing   4  6. Standing unsupported one foot in front                                   4  7. Standing on one leg                                                                4    Pt scored 27/28 which shows no risk for falls.        4 Item Dynamic Gait Index Score: 11/12 Fall Risk: no  [Scores of 10 or less indicate increased risk for falls]   Gait level surface: 3  Grading: David the lowest category that applies.  (3)  Normal: Walks 20’, no assistive devices, good speed, no evidence of imbalance, normal gait pattern.  (2)  Mild Impairment: Walks 20’, uses assistive devices, slower speed, mild gait deviations.  (1)  Moderate Impairment: Walks 20’, slow speed, abnormal gait pattern, evidence of imbalance.  (0)  Severe Impairment: Cannot walk 20’ without assistance, severe gait deviations or imbalance.     Change in gait speed: 2  Grading: David the lowest category that applies.  (3)  Normal: Able to smoothly change walking speed without loss of balance or gait deviation. Shows a significant difference in walking speed between  normal, fast and slow speeds.   (2)  Mild Impairment: Is able to change speed but demonstrates mild gait deviations, or no gait deviations but unable to achieve a significant change in velocity, or uses an assistive device.   (1)  Moderate Impairment: Makes only minor adjustments to walking speed, or accomplishes a change in speed with significant gait deviations, or changes speed but has significant gait deviations, or changes speed but loses balance but is able to recover and continue walking.  (0)  Severe Impairment: Cannot change speeds, or loses balance and has to reach for wall or be caught.    Gait with horizontal head turns: 3  (3)  Normal: Performs head turns smoothly with no change in gait  (2)  Mild Impairment: Performs head turns smoothly with slight change in gait velocity, i.e., minor disruption to smooth gait path or uses walking aid.  (1) Moderate Impairment: Performs head turns with moderate change in gait velocity, slows down, staggers but recovers, can continue to walk.  (0)  Severe Impairment: Performs task with severe disruption of gait, i.e., staggers outside 15” path, loses balance, stops, reaches for wall.    Gait with vertical head turns: 3  Grading: David the lowest category that applies.  (3) Normal: Performs head turns smoothly with no change in gait.  (2) Mild Impairment: Performs head turns smoothly with slight change in gait velocity, i.e., minor disruption to smooth gait path or uses walking aid.  (1) Moderate Impairment: Performs head turns with moderate change in gait velocity, slows down, staggers but recovers, can continue to walk.  (0) Severe Impairment: Performs task with severe disruption of gait, i.e., staggers outside 15” path, loses balance, stops, reaches for wall.      Patient End of Session: In bed;Needs met;With  staff;Call light within reach;All patient questions and concerns addressed    PT Session Time: 24 minutes  Gait Training: 10 minutes  Therapeutic Activity: 14  minutes  Therapeutic Exercise:  minutes   Neuromuscular Re-education:  minutes

## 2024-09-16 NOTE — PROGRESS NOTES
[facility] Progress Note     CC: Hospital Follow up    PCP: Mildred Steward MD       Assessment/Plan:     Active Problems:    Cerebrovascular accident (HCC)    Moyamoya    Moyamoya disease    Aphasia    Facial droop    Hypokalemia    Anemia    Atonic constipation        Patient is a 54 year old female with PMH sig for ADHD who presented for elective craniotomy for moyamoya disease.  She is status post EDAS.    Recent CVA  Moyamoya disease  Status post EDAS, POD 3  - Neurochecks  - Pain management per neurosurgery  - PT/OT/speech evaluation-stable  - Brown to be maintained  - BP parameters systolic 120-180, needed brandon for BP management-plan to wean neotoday  -Keppra 500 mgTwice dailyx 7 days postsurgery  -Neurosurgery and neurointensivist following    R sided facial droop-improved  R sided weakness- improved  - frequent neurochecks,   - adequate BP control  - Speech eval-diet advanced    Bradycardia  - noted on tele, patient asymptomatic  - pt has \"erratic HR\" per family, likely high in the setting of adderall  - obtain EKG-reviewed, no blocks noted  - telemetry    ADHD  - Currently hold home meds      Prophylaxis:   DVT with scd, heparin subcu  Atrophy Prophylaxis PT/OT  Lines/Monitors:     Dispo: CCU  Code status: Full    Patient and/or patient's family given opportunity to ask questions and note understanding and agreeing with therapeutic plan as outlined    Thank you for allowing me to participate in the care of this patient.     Thank You,  Stefani Oden DO    [facility] Hospitalist  Pager   Answering Service number: [phone]       Subjective:     Sitting in a chair.   at bedside.  Patient is able to phonate more words today.  She will be advanced on a diet.  Overall symptoms are improving.  Her pain is controlled.  Drain has been removed.  She is very tired because she has trouble sleeping at night.    OBJECTIVE:    Blood pressure 120/62, pulse 69, temperature 97.6 °F (36.4 °C), temperature source  Temporal, resp. rate 11, weight 133 lb 2.5 oz (60.4 kg), SpO2 97%, not currently breastfeeding.    Temp:  [97.4 °F (36.3 °C)-99.8 °F (37.7 °C)] 97.6 °F (36.4 °C)  Pulse:  [47-78] 69  Resp:  [10-18] 11  BP: (108-174)/(53-98) 120/62  SpO2:  [95 %-100 %] 97 %  AO: (126-186)/(62-82) 143/68      Intake/Output:    Intake/Output Summary (Last 24 hours) at 9/16/2024 1307  Last data filed at 9/16/2024 1200  Gross per 24 hour   Intake 3651.8 ml   Output 3455 ml   Net 196.8 ml       Last 3 Weights   09/15/24 0400 133 lb 2.5 oz (60.4 kg)   09/14/24 0444 132 lb 11.5 oz (60.2 kg)   09/12/24 1944 134 lb 14.7 oz (61.2 kg)   09/12/24 1926 134 lb 14.7 oz (61.2 kg)   09/04/24 0600 134 lb 3.2 oz (60.9 kg)   08/30/24 0928 132 lb (59.9 kg)   08/28/24 0213 133 lb 9.6 oz (60.6 kg)   08/28/24 0150 133 lb 8 oz (60.6 kg)   08/27/24 1840 132 lb (59.9 kg)       Exam   Gen: No acute distress, alert and oriented x3, no focal neurologic deficits  Heent: Surgical staples intact, clean and dry, drain has been removed   pulm: Lungs clear, normal respiratory effort  CV: Heart with regular rate and rhythm, no peripheral edema  Abd: Abdomen soft, nontender, nondistended, bowel sounds present  MSK: Full range of motion in extremities, no clubbing, no cyanosis  Skin: no rashes or lesions  Neuro: AO*3, right-sided facial droop, otherwise, neuro exam is grossly intact, talking a little bit more today   Psyc: appropriate mood and affect      Data Review:       Labs:     Recent Labs   Lab 09/12/24  1942 09/13/24  0508 09/13/24  1617 09/14/24  0418 09/15/24  0407 09/16/24  0431   RBC 3.64* 3.62* 3.61* 3.56* 3.64* 3.43*   HGB 11.0* 11.1* 11.1* 10.9* 11.0* 10.5*   HCT 32.3* 31.7* 31.4* 30.6* 32.8* 30.2*   MCV 88.7 87.6 87.0 86.0 90.1 88.0   MCH 30.2 30.7 30.7 30.6 30.2 30.6   MCHC 34.1 35.0 35.4 35.6 33.5 34.8   RDW 12.0 11.9 12.0 12.1 12.3 12.2   NEPRELIM 2.54 2.12 7.91*  --   --   --    WBC 4.8 4.4 8.8 10.3 10.2 7.0   .0 213.0 212.0 286.0 289.0 235.0          Recent Labs   Lab 09/14/24  0418 09/15/24  0406 09/16/24  0431   * 101* 101*   BUN 6* 7* 7*   CREATSERUM 0.63 0.64 0.52*   EGFRCR 106 106 111   CA 8.4* 9.0 9.2    140 140   K 3.4* 3.6  3.6 3.7    106 106   CO2 23.0 28.0 26.0       Recent Labs   Lab 09/13/24  0508 09/13/24  1617   ALT 17 15   AST 13 15   ALB 4.1 3.9         Imaging:  CT BRAIN OR HEAD (CPT=70450)    Result Date: 9/13/2024  PROCEDURE:  CT BRAIN OR HEAD (52917)  COMPARISON:  [facility] , CT, CT BRAIN OR HEAD (93139), 9/13/2024, 3:25 PM.  INDICATIONS:  aphasia  TECHNIQUE:  Noncontrast CT scanning is performed through the brain. Dose reduction techniques were used. Dose information is transmitted to the ACR (American College of Radiology) NRDR (National Radiology Data Registry) which includes the Dose Index Registry.  PATIENT STATED HISTORY: (As transcribed by Technologist)  Post-op.    FINDINGS: Sequelae of left pterional craniotomy.  There is pneumocephalus is again noted.  5 mm region of extra-axial pneumocephalus and minimal high density is relatively stable.  Brain parenchyma is unchanged.  Low-density regions in the left frontal lobe are stable.  Ventricles and sulci are appropriate for the patient's age.  No significant mass effect.  Visualized portions of paranasal sinuses are unremarkable.  Visualized portions of mastoid air cells are unremarkable.  Visualized portions of orbits are unremarkable.  IMPRESSION: No significant changes.   LOCATION:  [facility]   Dictated by (CST): Jerod Jean MD on 9/13/2024 at 8:23 PM     Finalized by (CST): Jerod Jean MD on 9/13/2024 at 8:26 PM       CT BRAIN OR HEAD (CPT=70450)    Result Date: 9/13/2024  CONCLUSION:  1. There is postoperative change from recent left craniotomy for revascularization procedure. 2. Trace pneumocephalus and hemorrhage is noted along craniotomy defect. 3. There is an evolving stroke in the left frontal lobe.    LOCATION:  [facility]   Dictated by (CST):  Javan Guadalupe MD on 9/13/2024 at 3:35 PM     Finalized by (CST): Javan Guadalupe MD on 9/13/2024 at 3:41 PM          Meds:      sennosides  17.2 mg Oral BID    polyethylene glycol (PEG 3350)  17 g Oral Daily    levETIRAcetam  500 mg Oral BID    atorvastatin  40 mg Oral Nightly    heparin  5,000 Units Subcutaneous 2 times per day    aspirin  325 mg Oral Daily    [Transfer Hold] vortioxetine  10 mg Oral Nightly    [Transfer Hold] progesterone  100 mg Oral Nightly      phenylephrine 30 mcg/min (09/16/24 1256)       ondansetron    HYDROcodone-acetaminophen **OR** HYDROcodone-acetaminophen    morphINE    [Transfer Hold] acetaminophen    [Transfer Hold] bisacodyl    [Transfer Hold] fleet enema

## 2024-09-16 NOTE — OCCUPATIONAL THERAPY NOTE
OCCUPATIONAL THERAPY TREATMENT NOTE - INPATIENT     Room Number: 6620/6620-A  Session: 1   Number of Visits to Meet Established Goals: 8    Presenting Problem: Mcclain mcclain with recent L CVA, s/p L crani and EDAS bypass 9/13      ASSESSMENT   Patient demonstrates excellent progress this session, goals remain in progress. Much improved standing balance and fine motor coordination. Supervision/cga for ADL. ALL GOALS will be upgraded to reflect patient's current functional status.     Patient continues to function near baseline with toileting, bathing, and lower body dressing.   Contributing factors to remaining limitations include impaired standing balance.  Next session anticipate patient to progress lower body dressing.  Occupational Therapy will continue to follow patient for duration of hospitalization.    Patient continues to benefit from continued skilled OT services: with no additional skilled services but increased support at home. Changing anticipated therapy needs from intense to home, no therapy services. PT communicated with  about the change.       OT Device Recommendations: None    History: Patient is a 54 year old female admitted on 9/12/2024 with Presenting Problem: Mcclain mcclain with recent L CVA, s/p L crani and EDAS bypass 9/13. Co-Morbidities : L hemispheric stroke 9/3/2024, ADHD, vit D deficiency, mcclain mcclain     Recommendations for nursing staff:   Transfers: sba  Toileting location: toilet    TREATMENT SESSION:  Patient Start of Session: supine  FUNCTIONAL TRANSFER ASSESSMENT  Sit to Stand: Edge of Bed  Edge of Bed: Supervision  Toilet Transfer: Supervision    BED MOBILITY  Supine to Sit : Supervision  Sit to Supine (OT): Not Tested    FUNCTIONAL ADL ASSESSMENT  LB Dressing Seated: Not Tested  Toileting Standing: Supervision    EDUCATION PROVIDED  Patient: Plan of Care; Role of Occupational Therapy; Compensatory ADL Techniques  Patient's Response to Education: Verbalized  Understanding      Equipment used: none    Therapist comments: pleasant, SBP using ABP within the parameter.  Supervision supine to sit.  Pt was using R and L hand equally to manage lines. SBA with PT to ambulate to bathroom. Cueing for safe grab bar use, supervision.  Stand to sit, sba. Pt was able to complete sink side hand hygiene, sba, no device. Pt was attending to R and L equally while ambulating in hallway with PT. Chair alarm on.     Patient End of Session: Up in chair;Needs met;Call light within reach;RN aware of session/findings;All patient questions and concerns addressed;Alarm set    PAIN ASSESSMENT  Ratin  Location: RUE with BP reading, incision  Management Techniques: Repositioning;Relaxation;Nurse notified     OBJECTIVE  Precautions: Drain(s) (-180)    AM-PAC ‘6-Clicks’ Inpatient Daily Activity Short Form  -   Putting on and taking off regular lower body clothing?: A Little  -   Bathing (including washing, rinsing, drying)?: A Little  -   Toileting, which includes using toilet, bedpan or urinal? : A Little (supervision)  -   Putting on and taking off regular upper body clothing?: None  -   Taking care of personal grooming such as brushing teeth?: None  -   Eating meals?: None    AM-PAC Score:  Score: 21  Approx Degree of Impairment: 32.79%  Standardized Score (AM-PAC Scale): 44.27    PLAN  OT Treatment Plan: Balance activities;ADL training;Functional transfer training;UE strengthening/ROM;Patient/Family education;Patient/Family training;Equipment eval/education;Neuromuscluar reeducation;Compensatory technique education;Fine motor coordination activities  Rehab Potential : Good  Frequency: 3-5x/week    OT Goals:   Progressing   ADL Goals   Patient will perform grooming: with supervision-MET   Patient will perform lower body dressing:  with supervision -ONGOING  Patient will perform toileting: with supervision-MET      Functional Transfer Goals  Patient will transfer from sit to  supine:  with supervision  Patient will transfer from supine to sit:  with supervision-MET 9/16  Patient will transfer from sit to stand:  with supervision-MET 9/16  Patient will transfer to toilet:  with supervision-MET 9/16     UE Exercise Program Goal  Patient will be supervision with right AROM HEP (home exercise program).  Patient will be supervision with right coordination HEP (home exercise program)     Additional Goals  Pt will stand 5 minutes during light ADL     Therapist Goals  Complete PhQ9 vs ADRS    OT Session Time: 16 minutes  Self-Care Home Management: 8 minutes  Therapeutic Activity: 4 minutes

## 2024-09-16 NOTE — PLAN OF CARE
Assumed pt care this AM. Pt aphasia resolved over shift; A/O x4, trace RUE weakness/drift, R facial droop stable. Weaning brandon down as able within -180. Up to chair today. Brown discontinued, voiding per bedside commode.    Problem: NEUROLOGICAL - ADULT  Goal: Achieves stable or improved neurological status  Description: INTERVENTIONS  - Assess for and report changes in neurological status  - Initiate measures to prevent increased intracranial pressure  - Maintain blood pressure and fluid volume within ordered parameters to optimize cerebral perfusion and minimize risk of hemorrhage  - Monitor temperature, glucose, and sodium. Initiate appropriate interventions as ordered  Outcome: Progressing  Goal: Achieves maximal functionality and self care  Description: INTERVENTIONS  - Monitor swallowing and airway patency with patient fatigue and changes in neurological status  - Encourage and assist patient to increase activity and self care with guidance from PT/OT  - Encourage visually impaired, hearing impaired and aphasic patients to use assistive/communication devices  Outcome: Progressing  Goal: Absence of seizures  Description: INTERVENTIONS  - Monitor for seizure activity  - Administer anti-seizure medications as ordered  - Monitor neurological status  Outcome: Progressing  Goal: Remains free of injury related to seizure activity  Description: INTERVENTIONS:  - Maintain airway, patient safety  and administer oxygen as ordered  - Monitor patient for seizure activity, document and report duration and description of seizure to MD/LIP  - If seizure occurs, turn patient to side and suction secretions as needed  - Reorient patient post seizure  - Seizure pads on all 4 side rails  - Instruct patient/family to notify RN of any seizure activity  - Instruct patient/family to call for assistance with activity based on assessment  Outcome: Progressing     Problem: PAIN - ADULT  Goal: Verbalizes/displays adequate comfort  level or patient's stated pain goal  Description: INTERVENTIONS:  - Encourage pt to monitor pain and request assistance  - Assess pain using appropriate pain scale  - Administer analgesics based on type and severity of pain and evaluate response  - Implement non-pharmacological measures as appropriate and evaluate response  - Consider cultural and social influences on pain and pain management  - Manage/alleviate anxiety  - Utilize distraction and/or relaxation techniques  - Monitor for opioid side effects  - Notify MD/LIP if interventions unsuccessful or patient reports new pain  - Anticipate increased pain with activity and pre-medicate as appropriate  Outcome: Progressing

## 2024-09-16 NOTE — PROGRESS NOTES
[facility]   NEUROCRITICAL CARE   PROGRESS NOTE    Admission date: 9/12/2024  Reason for Consult: Post op EDAS  Chief Complaint: Recent stroke  ________________________________________________________________    SUBJECTIVE     24 Hour Significant Events: No further worsening of symptoms overnight. Low dose brandon for BP parameters. Updated  at bedside.     OBJECTIVE   VITAL SIGNS:   Temp:  [97.4 °F (36.3 °C)-99.8 °F (37.7 °C)] 97.4 °F (36.3 °C)  Pulse:  [45-78] 56  Resp:  [10-18] 12  BP: (108-174)/(53-98) 108/65  SpO2:  [95 %-100 %] 95 %  AO: (133-188)/(66-82) 158/77    INTAKE/OUTPUT:  Intake/Output Summary (Last 24 hours) at 9/16/2024 0921  Last data filed at 9/16/2024 0900  Gross per 24 hour   Intake 3531.8 ml   Output 4330 ml   Net -798.2 ml     PHYSICAL EXAM:  GENERAL APPEARANCE: Patient resting comfortably in chair, NAD  HEENT: Normocephalic, atraumatic. Drain   LUNGS: Normal respiratory rate and effort   HEART: Regular rate and rhythm   ABDOMEN: Soft. No tenderness, guarding, or rebound.     NEUROLOGIC:    Mental Status:  A&O x 4, Follows simple commands, slow mildly dysarthric speech, fragmented word output improving, able to name and repeat this AM  Cranial nerves: PERRL.  Visual fields full.  EOMI.  R droop.  Hearing grossly intact. Tongue midline   Motor: Drift:  Absent; Motor exam is 5 out of 5 in all extremities bilaterally except 4+/5 in RUE   Sensation: Intact to light touch bilaterally  Cerebellar: Normal Finger-To-Nose test    LABORATORY DATA:  Last 24 hour labs were reviewed in detail.  Recent Labs   Lab 09/14/24  0418 09/15/24  0406 09/16/24  0431    140 140   K 3.4* 3.6  3.6 3.7    106 106   CO2 23.0 28.0 26.0   * 101* 101*   BUN 6* 7* 7*   CREATSERUM 0.63 0.64 0.52*     Recent Labs   Lab 09/14/24  0418 09/15/24  0407 09/16/24  0431   WBC 10.3 10.2 7.0   HGB 10.9* 11.0* 10.5*   .0 289.0 235.0     Recent Labs   Lab 09/13/24  0508 09/13/24  1615    ALT 17 15   AST 13 15     Recent Labs   Lab 09/14/24  0418 09/15/24  0406 09/16/24  0431   MG 1.7 2.1 1.9       Scheduled Meds:   sennosides  17.2 mg Oral BID    polyethylene glycol (PEG 3350)  17 g Oral Daily    atorvastatin  40 mg Oral Nightly    heparin  5,000 Units Subcutaneous 2 times per day    levETIRAcetam  500 mg Intravenous Q12H    aspirin  325 mg Oral Daily    [Transfer Hold] vortioxetine  10 mg Oral Nightly    [Transfer Hold] progesterone  100 mg Oral Nightly     Continuous Infusions:   sodium chloride 125 mL/hr at 09/16/24 0600    phenylephrine 35 mcg/min (09/16/24 0600)     PRN Meds:  ondansetron    HYDROcodone-acetaminophen **OR** HYDROcodone-acetaminophen    morphINE    [Transfer Hold] acetaminophen    [Transfer Hold] polyethylene glycol (PEG 3350)    [Transfer Hold] sennosides    [Transfer Hold] bisacodyl    [Transfer Hold] fleet enema    Radiology:    No results found.  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          - Continue Keppra 500 mg BID x7 days          - PT/OT/SLP evals     Cardiovascular:         - SBP goal as noted above          - Hold home medications         - Evelio gtt PRN      Pulmonary: - Saturating well on RA, encourage IS      Renal:         - Monitor  I&Os           -Daily BMP     Hypokalemia - K being replaced this AM      Gastrointestinal:  Nutrition: - ADAT    Atonic constipation  - No bowel movement since admission, bowel regimen scheduled      Infectious Disease: - No leukocytosis, afebrile, continue to monitor      Heme/Onc   Anemia - Hb goal > 7, daily CBC     Endocrine: - Accuchecks q6 with SSI prn            Checklist         - Lines: PIVs, subgaleal drain, arterial line         - DVT Prophylaxis: SCDs, HSQ         - Diet: ADAT         - IVF: NS - Dc         - Electrolytes: Replete per protocol         - Code Status: FULL     Dispo: CNICU      Greater than 40 minutes of critical care time (exclusive of billable procedures) was administered to manage and/or prevent neurologic instability. This involved direct patient intervention, complex decision making, and/or extensive discussions with the patient, family, and clinical staff.    Dustin Dillard MD  Neurocritical Care  [facility]

## 2024-09-16 NOTE — PROCEDURES
FINESSE drain removed at bedside.  Catheter tip intact on drain removal.  Site secured with 3-0 Nylon. Site is C/D/I without drainage, erythema or edema.   Patient tolerated removal well.    Amina Rosenberg PA-C  [facility]  9/16/2024 10:02 AM

## 2024-09-16 NOTE — SLP NOTE
SPEECH DAILY NOTE - INPATIENT    ASSESSMENT & PLAN   ASSESSMENT  Pt seen for dysphagia tx to assess tolerance with recommended diet, ensure proper utilization of aspiration precautions and provide pt/family education.  Patient lethargic but arousable.  at bedside, supportive. He reports previously noted aphasia is significantly improved. 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 Patient agreeable to evaluation but wished to defer until she may be more alert. SLP will continue to follow and complete assessment when alert is relatively improved. Patient requesting diet advancement. SLP assessed tolerance with solids. Patient able to self present solids and thin liquids with intact oral retrieval and containment. Mastication was thorough and efficient. No overt signs of aspiration noted with solids or thin liquids. Recommend advance to regular solids and continue thin liquids. Discussed with RN.     Diet Recommendations - Solids: Regular  Diet Recommendations - Liquids: Thin Liquids    Compensatory Strategies Recommended: Slow rate;Small bites and sips  Aspiration Precautions: Upright position;Slow rate;Small bites and sips  Medication Administration Recommendations: One pill at a time    Patient Experiencing Pain: No                Treatment Plan  Treatment Plan/Recommendations: Dysphagia therapy;Communication evaluation    Interdisciplinary Communication: Discussed with RN            GOALS  Goal #1 The patient will tolerate regular consistency and thin liquids without overt signs or symptoms of aspiration with 95 % accuracy over 1-2 session(s).  In Progress   Goal #2 The patient/family/caregiver will demonstrate understanding and implementation of aspiration precautions and swallow strategies  independently over 1-2 session(s).     In Progress   Goal #3 Communication eval   New goal     FOLLOW UP  Follow Up Needed (Documentation Required): Yes  SLP Follow-up Date: 09/17/24  Number of Visits to Meet Established Goals: 4    Session: 1    If you have any questions, please contact Antonio Miles MS CCC-SLP  Pager [phone]

## 2024-09-16 NOTE — CONSULTS
PHYSICAL MEDICINE AND REHABILITATION CONSULTATION       Location [facility] 6NE-A Attending Cornelio Guzman MD   Hosp Day # 3 PCP Mildred Steward MD     Patient Identification  Isabella Kevin is a 54 year old female.  :  1970  Admit Date:  2024  Attending Provider:  Cornelio Guzman MD                                  Primary Care Physician:  Mildred Steward MD   Admitting Diagnosis: MoyaMoya disease  Moyamoya  Moyamoya disease    CC: Impaired mobility and ADL dysfunction secondary to moyamoya        HPI: This is a 54-year-old woman with a past medical history of ADHD who presented to the emergency department on  with acute onset slurred speech and aphasia.  MRI showed left hemispheric focus of infarct.  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  She was started on aspirin and Plavix.  She was admitted for an elective Left-sided frontotemporal gfhejzskx-anso-gqjpl-synangiosis (EDAS) indirect bypass  performed by Dr. Guzman on .  She is on Keppra x 7 days for seizure prophylaxis.  EEG  showed no focal, lateralized or generalized epileptiform discharges or seizures noted.  Systolic blood pressure to be maintained between 1 50-1 80.    PM&R has now been consulted in order to assess patient's functional status and make recommendations for rehabilitation plan of care.   is present at bedside and states he does not wish for her to be woken up since she just got to sleep.  Discussed the role of physiatry in making recommendations for acute inpatient rehab.  Discussed the benefits of home with outpatient rehab versus inpatient rehab.   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  He does not feel that she would like to go to any inpatient rehab and would like to discuss it further with her before making any sort of  decisions.    ROS:  All other systems were reviewed and are negative except as noted under HPI    Current medications: Full medication list has been personally reviewed and include:   atorvastatin (Lipitor) tab 40 mg  40 mg Oral Nightly    [COMPLETED] potassium chloride 40 mEq in 250mL sodium chloride 0.9% IVPB premix  40 mEq Intravenous Once    [COMPLETED] magnesium sulfate in sterile water for injection 2 g/50mL IVPB premix 2 g  2 g Intravenous Once    [COMPLETED] morphINE PF 2 MG/ML injection 2 mg  2 mg Intravenous Once    [] morphINE PF 2 MG/ML injection        sodium chloride 0.9% infusion   Intravenous Continuous    heparin (Porcine) 5000 UNIT/ML injection 5,000 Units  5,000 Units Subcutaneous 2 times per day    levETIRAcetam (Keppra) 500 mg/5mL injection 500 mg  500 mg Intravenous Q12H    aspirin tab 325 mg  325 mg Oral Daily    ondansetron (Zofran) 4 MG/2ML injection 4 mg  4 mg Intravenous Q6H PRN    HYDROcodone-acetaminophen (Norco)  MG per tab 1 tablet  1 tablet Oral Q4H PRN    Or    HYDROcodone-acetaminophen (Norco)  MG per tab 2 tablet  2 tablet Oral Q4H PRN    morphINE PF 2 MG/ML injection 2 mg  2 mg Intravenous Q1H PRN    [COMPLETED] sodium chloride 0.9 % IV bolus 500 mL  500 mL Intravenous Once    [COMPLETED] sodium chloride 0.9 % IV bolus 500 mL  500 mL Intravenous Once    [COMPLETED] levETIRAcetam (Keppra) 500 mg/5mL injection 1,000 mg  1,000 mg Intravenous Once    phenylephrine in sodium chloride 0.9% (Evelio-Synephrine) 50 mg/250mL infusion premix   mcg/min Intravenous Continuous    [COMPLETED] sodium chloride 0.9 % IV bolus 1,000 mL  1,000 mL Intravenous Once    [Transfer Hold] acetaminophen (Tylenol) tab 650 mg  650 mg Oral Q4H PRN    [Transfer Hold] polyethylene glycol (PEG 3350) (Miralax) 17 g oral packet 17 g  17 g Oral Daily PRN    [Transfer Hold] sennosides (Senokot) tab 17.2 mg  17.2 mg Oral Nightly PRN    [Transfer Hold] bisacodyl (Dulcolax) 10 MG rectal suppository  10 mg  10 mg Rectal Daily PRN    [Transfer Hold] fleet enema (Fleet) rectal enema 133 mL  1 enema Rectal Once PRN    [Transfer Hold] vortioxetine (Trintellix) tab 10 mg  10 mg Oral Nightly    [Transfer Hold] progesterone (Prometrium) cap 100 mg  100 mg Oral Nightly       Allergies:  Allergies   Allergen Reactions    Ciprofloxacin-Hydrocortisone ITCHING and HIVES     Tingling of hands and feet    Ibuprofen SWELLING    Birch Tar Oil [Betula Alba Oil]      [place]    Modafinil OTHER (SEE COMMENTS)     Pt felt like throat was closing       Past Medical History:  Past Medical History:    ADHD (attention deficit hyperactivity disorder)    Allergic rhinitis    Idiopathic hypersomnia    Osteoporosis    Seasonal affective disorder (HCC)    Vitamin D deficiency       Past Surgical History:  Past Surgical History:   Procedure Laterality Date    Ablation  2011    uterus       Family History:   Family History   Problem Relation Age of Onset    Hypertension Mother     Hypertension Brother     Other (stomach cancer) Paternal Aunt        Social History:  Social History     Socioeconomic History    Marital status:    Tobacco Use    Smoking status: Never    Smokeless tobacco: Never   Vaping Use    Vaping status: Never Used   Substance and Sexual Activity    Alcohol use: Not Currently     Alcohol/week: 0.8 standard drinks of alcohol     Types: 1 drink(s) per week    Drug use: No    Sexual activity: Yes     Partners: Male     Birth control/protection: Surgical     Comment: ablation    Other Topics Concern    Caffeine Concern No    Stress Concern No    Weight Concern No    Special Diet No    Exercise No    Seat Belt Yes       FUNCTIONAL STATUS:  Premorbid functional status/Living Situation-  Independent    Current functional Status:   FUNCTIONAL ABILITY STATUS  Gait Assessment   Functional Mobility/Gait Assessment  Gait Assistance: Minimum assistance  Distance (ft): 2  Assistive Device:  (hand held assist)     Skilled Therapy  Provided      Bed Mobility:  Rolling: CGA  Supine to sit: CGA         Sit to supine: CGA  Able to scoot to EOB supervision  Sitting balance: supervision      Tolerated sitting at EOB x 10mins     Transfer Mobility:  Sit to stand: min A         Stand to sit: min A  Gait = 2ft with HHA CGA  Tolerated standing x 1 min with CGA  Noted BP to decrease to 130s in standing         OBJECTIVE:    /68   Pulse 57   Temp 98.1 °F (36.7 °C) (Temporal)   Resp 15   Wt 133 lb 2.5 oz (60.4 kg)   SpO2 98%   BMI 24.35 kg/m²   Body mass index is 24.35 kg/m².   Exam deferred at patient's 's insistence    Data Review:    Lab Results   Component Value Date    WBC 10.2 09/15/2024    HGB 11.0 09/15/2024    HCT 32.8 09/15/2024    .0 09/15/2024    CREATSERUM 0.64 09/15/2024    BUN 7 09/15/2024     09/15/2024    K 3.6 09/15/2024    K 3.6 09/15/2024     09/15/2024    CO2 28.0 09/15/2024     09/15/2024    CA 9.0 09/15/2024    MG 2.1 09/15/2024    PGLU 101 09/15/2024       No results found.    ASSESSMENT:    Deficits of self care and mobility secondary to   Active Problems:    Cerebrovascular accident (HCC)    Moyamoya    Moyamoya disease    Aphasia    Facial droop    Hypokalemia    Anemia      Recommendations: Acute inpatient rehab versus home with outpatient rehab pending clinical progress.  Based on how the patient did yesterday, she will likely need and benefit from acute inpatient rehab with an estimated length of stay of about 10 to 14 days with good potential  to achieve modified independent level for safe community discharge.  However, if patient progresses to contact-guard assist or better, and the patient's  can provide 24-hour supervision and assist as needed, then home could be a reasonable alternative.  Will follow-up in the day next day or so once patient  has had a chance to speak with patient.    Natty Flores MD, FAAPM&R

## 2024-09-16 NOTE — PROGRESS NOTES
[facility]  Neurosurgery Progress Note    Isabella Kevin Patient Status:  Inpatient    1970 MRN [id number]   Location [facility] 6NE-A Attending Cornelio Guzman MD   Hosp Day # 4 PCP Mildred Steward MD     Chief Complaint:  POD #3 L EDAS for moyamoya    Subjective:  Patient reports no HA. She notices slight improvement in her R arm strength. Has not noticed any improvement of her speech.     Objective/Physical Exam:    Vital Signs:  Blood pressure 143/64, pulse (!) 48, temperature 99.3 °F (37.4 °C), temperature source Temporal, resp. rate 12, weight 133 lb 2.5 oz (60.4 kg), SpO2 98%, not currently breastfeeding.  Neurologic:   Delayed speech with quiet voice. Oriented x 3.   Follows commands briskly  PERRL, EOMi  Right facial droop  Right triceps and  4/5 otherwise 4+/5 in R biceps and deltoid  5/5 strength LUE and BLE upper extremity 4+/5 R triceps and  ow 5/5 all other    Incision clean dry and intact  FINESSE bulb with minimal bloody output    Labs:  Recent Labs   Lab 24  1942 24  0508 24  1617 24  0418 09/15/24  0407 24  0431   RBC 3.64* 3.62* 3.61* 3.56* 3.64* 3.43*   HGB 11.0* 11.1* 11.1* 10.9* 11.0* 10.5*   HCT 32.3* 31.7* 31.4* 30.6* 32.8* 30.2*   MCV 88.7 87.6 87.0 86.0 90.1 88.0   MCH 30.2 30.7 30.7 30.6 30.2 30.6   MCHC 34.1 35.0 35.4 35.6 33.5 34.8   RDW 12.0 11.9 12.0 12.1 12.3 12.2   NEPRELIM 2.54 2.12 7.91*  --   --   --    WBC 4.8 4.4 8.8 10.3 10.2 7.0   .0 213.0 212.0 286.0 289.0 235.0       Recent Labs   Lab 24  0418 09/15/24  0406 24  0431   * 101* 101*   BUN 6* 7* 7*   CREATSERUM 0.63 0.64 0.52*   EGFRCR 106 106 111   CA 8.4* 9.0 9.2    140 140   K 3.4* 3.6  3.6 3.7    106 106   CO2 23.0 28.0 26.0     Assessment:  54-year-old  s/p left-sided EDAS for symptomatic moyamoya disease    Plan:  -Optimization per NCC  -Q2' neurochecks  -Drain removed this morning, see separate procedure  note  -Pain control  -Will begin to liberalize SBP. SBP goal 140-180 for 4 hours. If tolerates this well, plan for SBP goal 130-180 for 4 hours followed by SBP goal 120-180. Likely transfer to floor tomorrow pending clinical course with titrating SBP  -Ok to shower and wash hair tomorrow (9/17)  -SCDs and SQH for DVT ppx  -Continue daily ASA 325mg  -ADAT  -PT/OT/OOB  -Maintain euvolemia  -Continue Keppra  -Dispo planning     Patient seen with Dr. Thomas Rosenberg PA-C  [facility]  9/16/2024 10:11 AM     Is this a shared or split note between Advanced Practice Provider and Physician? Yes

## 2024-09-16 NOTE — PLAN OF CARE
Assumed care of patient at 1930. Pt is drowsy/lethargic,  but easy to arouse. Pt follows commands and MYLES. Right side weaker than left. Neuro exams q2hr, see neuro flowsheet for detailed assessment. Evelio-Synephrine infusing and titrated to maintain -180. Up to BSC with minimal assistance. POC discussed with patient. For complete assessment see E charting.

## 2024-09-16 NOTE — PLAN OF CARE
- Neuro Q2  - wean brandon to Keep SBP normotensive  - 1 assist when ambulating  -  at bedside and updated on the plan of care     Problem: NEUROLOGICAL - ADULT  Goal: Achieves stable or improved neurological status  Description: INTERVENTIONS  - Assess for and report changes in neurological status  - Initiate measures to prevent increased intracranial pressure  - Maintain blood pressure and fluid volume within ordered parameters to optimize cerebral perfusion and minimize risk of hemorrhage  - Monitor temperature, glucose, and sodium. Initiate appropriate interventions as ordered  Outcome: Progressing  Goal: Achieves maximal functionality and self care  Description: INTERVENTIONS  - Monitor swallowing and airway patency with patient fatigue and changes in neurological status  - Encourage and assist patient to increase activity and self care with guidance from PT/OT  - Encourage visually impaired, hearing impaired and aphasic patients to use assistive/communication devices  Outcome: Progressing     Problem: PAIN - ADULT  Goal: Verbalizes/displays adequate comfort level or patient's stated pain goal  Description: INTERVENTIONS:  - Encourage pt to monitor pain and request assistance  - Assess pain using appropriate pain scale  - Administer analgesics based on type and severity of pain and evaluate response  - Implement non-pharmacological measures as appropriate and evaluate response  - Consider cultural and social influences on pain and pain management  - Manage/alleviate anxiety  - Utilize distraction and/or relaxation techniques  - Monitor for opioid side effects  - Notify MD/LIP if interventions unsuccessful or patient reports new pain  - Anticipate increased pain with activity and pre-medicate as appropriate  Outcome: Progressing

## 2024-09-17 NOTE — PROGRESS NOTES
Significant event:    Called to bedside for acute change in mental status and exam while up in chair.      Arrived to bedside and patient minimally responsive, bradycardic to 40s and hypotensive to 50s systolic.  Patient opening eyes but unable to follow commands, move extremities or speak.  Chair laid flat.  Evelio gtt was immediately restarted--.Evelio gtt had been shut off at just after 7pm tonight.  BP and exam have been stable.  Patient just up to bathroom at 2035 and into chair at 2040 to eat, without issues  Episode occurred at 2047.      Exam began to slowly improve as BP increased.  Right side weakness and expressive aphasia with slowed speech noted.  Total time of SBP <110 was 8 minutes.  Once BP stable and exam improving patient was transferred to bed and HOB kept flat.      Dr Guzman and Dr Dillard were updated.  SBP goal to 150-180 for now--Evelio gtt infusing.  500ml NS IV fluid bolus given.  RN closely monitoring.    ARLENE Ashton  Critical Care NP  [facility]  Spect: [id number]  Pgr: [id number]    Exam back to baseline by 2300.  Low dose Evelio gtt continues

## 2024-09-17 NOTE — PROGRESS NOTES
[facility]   NEUROCRITICAL CARE   PROGRESS NOTE    Admission date: 9/12/2024  Reason for Consult: Post op EDAS  Chief Complaint: Recent stroke  ________________________________________________________________    SUBJECTIVE     24 Hour Significant Events: Worsening exam yesterday evening after hypotensive/bradycardic episode while in chair.   Restarted blood pressure parameters reset.  Extra IV fluid bolus this morning given overall net negative status.    OBJECTIVE   VITAL SIGNS:   Temp:  [97.4 °F (36.3 °C)-99.9 °F (37.7 °C)] 98.2 °F (36.8 °C)  Pulse:  [44-84] 60  Resp:  [9-20] 12  BP: ()/(57-80) 155/63  SpO2:  [95 %-100 %] 97 %  AO: ()/(28-88) 169/77    INTAKE/OUTPUT:  Intake/Output Summary (Last 24 hours) at 9/17/2024 0933  Last data filed at 9/17/2024 0605  Gross per 24 hour   Intake 889 ml   Output 2200 ml   Net -1311 ml     PHYSICAL EXAM:  GENERAL APPEARANCE: Patient resting comfortably in bed, NAD  HEENT: Normocephalic, atraumatic. Drain   LUNGS: Normal respiratory rate and effort   HEART: Regular rate and rhythm   ABDOMEN: Soft. No tenderness, guarding, or rebound.     NEUROLOGIC:    Mental Status:  A&O x 4, Follows simple commands, slow mildly dysarthric speech, fragmented word output improving, able to name and repeat this AM  Cranial nerves: PERRL.  Visual fields full.  EOMI.  R droop improving, more movement in face now.  Hearing grossly intact. Tongue midline   Motor: Drift:  RUE pronation; Motor exam is 5 out of 5 in all extremities bilaterally except 4+/5 in RUE   Sensation: Intact to light touch bilaterally  Cerebellar: Normal Finger-To-Nose test    LABORATORY DATA:  Last 24 hour labs were reviewed in detail.  Recent Labs   Lab 09/15/24  0406 09/16/24  0431 09/17/24  0412    140 140   K 3.6  3.6 3.7 3.6    106 105   CO2 28.0 26.0 28.2   * 101* 99   BUN 7* 7* 7*   CREATSERUM 0.64 0.52* 0.51*     Recent Labs   Lab 09/15/24  0407 09/16/24  0431  09/17/24  0412   WBC 10.2 7.0 6.2   HGB 11.0* 10.5* 10.7*   .0 235.0 258.0     Recent Labs   Lab 09/13/24  0508 09/13/24  1617   ALT 17 15   AST 13 15     Recent Labs   Lab 09/14/24  0418 09/15/24  0406 09/16/24  0431   MG 1.7 2.1 1.9       Scheduled Meds:   sodium chloride  1,000 mL Intravenous Once    sennosides  17.2 mg Oral BID    polyethylene glycol (PEG 3350)  17 g Oral Daily    levETIRAcetam  500 mg Oral BID    atorvastatin  40 mg Oral Nightly    heparin  5,000 Units Subcutaneous 2 times per day    aspirin  325 mg Oral Daily    [Transfer Hold] vortioxetine  10 mg Oral Nightly    [Transfer Hold] progesterone  100 mg Oral Nightly     Continuous Infusions:   phenylephrine 30 mcg/min (09/17/24 0800)     PRN Meds:  ondansetron    HYDROcodone-acetaminophen **OR** HYDROcodone-acetaminophen    morphINE    [Transfer Hold] acetaminophen    [Transfer Hold] bisacodyl    [Transfer Hold] fleet enema    Radiology:    No results found.  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 (9/3-9/5)  S/P craniotomy for left encephaloduroarteriosynangiosis         - Post op CT w/ expected post op changes          - C/b post op worsening of aphasia and droop, repeat CT stable          - EEG 9/14 with no focal, lateralized or generalized epileptiform discharges or seizures noted          - Continue asa and statin          - SBP goal back to 150-180 until cleared by nsg, potentially add midodrine today to help wean         - Continue Keppra 500 mg BID x7 days          - PT/OT/SLP evals     Cardiovascular:         - SBP goal as noted above          - Hold home medications         - Evelio gtt PRN      Pulmonary: - Saturating well on RA, encourage IS       Renal:         - Monitor I&Os           -Daily BMP     Hypokalemia - K being replaced this AM      Gastrointestinal:  Nutrition: - ADAT    Atonic constipation  - No bowel movement since admission, bowel regimen scheduled 9/17     Infectious Disease: - No leukocytosis, afebrile, continue to monitor      Heme/Onc   Anemia - Hb goal > 7, daily CBC     Endocrine: - Accuchecks q6 with SSI prn            Checklist         - Lines: PIVs, arterial line         - DVT Prophylaxis: SCDs, HSQ         - Diet: ADAT         - IVF: -          - Electrolytes: Replete per protocol         - Code Status: FULL     Dispo: [facility]ICU      Greater than 50 minutes of critical care time (exclusive of billable procedures) was administered to manage and/or prevent neurologic instability. This involved direct patient intervention, complex decision making, and/or extensive discussions with the patient, family, and clinical staff.    Dustin Dillard MD  Neurocritical Care  [facility]

## 2024-09-17 NOTE — PLAN OF CARE
A/Ox4, slow speech, R facial droop, mild R extremity weakness. Evelio synephrine continued to maintain -180, 1L NS bolus given as ordered.  HOB slowly increased. 1500 Up to bathroom and sat in chair x1 hour, tolerated well without change in neuro exam. L head incision with staples WDL. Surgical pain managed with Norco. Constipation, scheduled medications given, BM after one dose MOM. Spouse at bedside, updated on condition and POC.      Problem: NEUROLOGICAL - ADULT  Goal: Achieves stable or improved neurological status  Description: INTERVENTIONS  - Assess for and report changes in neurological status  - Initiate measures to prevent increased intracranial pressure  - Maintain blood pressure and fluid volume within ordered parameters to optimize cerebral perfusion and minimize risk of hemorrhage  - Monitor temperature, glucose, and sodium. Initiate appropriate interventions as ordered  Outcome: Progressing  Goal: Achieves maximal functionality and self care  Description: INTERVENTIONS  - Monitor swallowing and airway patency with patient fatigue and changes in neurological status  - Encourage and assist patient to increase activity and self care with guidance from PT/OT  - Encourage visually impaired, hearing impaired and aphasic patients to use assistive/communication devices  Outcome: Progressing  Goal: Absence of seizures  Description: INTERVENTIONS  - Monitor for seizure activity  - Administer anti-seizure medications as ordered  - Monitor neurological status  Outcome: Progressing  Goal: Remains free of injury related to seizure activity  Description: INTERVENTIONS:  - Maintain airway, patient safety  and administer oxygen as ordered  - Monitor patient for seizure activity, document and report duration and description of seizure to MD/LIP  - If seizure occurs, turn patient to side and suction secretions as needed  - Reorient patient post seizure  - Seizure pads on all 4 side rails  - Instruct patient/family  to notify RN of any seizure activity  - Instruct patient/family to call for assistance with activity based on assessment  Outcome: Progressing     Problem: PAIN - ADULT  Goal: Verbalizes/displays adequate comfort level or patient's stated pain goal  Description: INTERVENTIONS:  - Encourage pt to monitor pain and request assistance  - Assess pain using appropriate pain scale  - Administer analgesics based on type and severity of pain and evaluate response  - Implement non-pharmacological measures as appropriate and evaluate response  - Consider cultural and social influences on pain and pain management  - Manage/alleviate anxiety  - Utilize distraction and/or relaxation techniques  - Monitor for opioid side effects  - Notify MD/LIP if interventions unsuccessful or patient reports new pain  - Anticipate increased pain with activity and pre-medicate as appropriate  Outcome: Progressing

## 2024-09-17 NOTE — PAYOR COMM NOTE
--------------  CONTINUED STAY REVIEW    Payor: [employer]  Subscriber #:  [id number]  Authorization Number: [id number]    Admit date: 9/12/24  Admit time:  6:09 PM    REVIEW DOCUMENTATION:      9/15 IM    Active Problems:    Cerebrovascular accident (HCC)    Moyamoya    Moyamoya disease    Aphasia    Facial droop    Hypokalemia    Anemia           Patient is a 54 year old female with PMH sig for ADHD who presented for elective craniotomy for moyamoya disease.  She is status post EDAS.     Recent CVA  Moyamoya disease  Status post EDAS, POD 2  - Neurochecks  - Pain management per neurosurgery  - PT/OT/speech evaluation-stable  - Brown to be maintained  - BP parameters systolic 120-180, needed brandon for BP management  -Keppra 500 mgTwice daily  -Neurosurgery and neurointensivist following     R sided facial droop  R sided weakness- improved  - frequent neurochecks,   - adequate BP control  - Speech eval     Bradycardia  - noted on tele, patient asymptomatic  - pt has \"erratic HR\" per family, likely high in the setting of adderall  - obtain EKG  - telemetry     ADHD  - Currently hold home meds        Prophylaxis:   DVT with scd, heparin subcu  Atrophy Prophylaxis PT/OT  Lines/Monitors:      Dispo: CCU  Code status: Full     Patient and/or patient's family given opportunity to ask questions and note understanding and agreeing with therapeutic plan as outlined     Thank you for allowing me to participate in the care of this patient.      Thank You,  Stefani Oden DO     [facility] [place] [facility]  Pager   Answering Service number: [phone]         Subjective:      BP was low prior to parameters, still on brandon but decreasing requirements. Eating.   Tele with daniel.   at bedside. Feels better. Talking a little bit more.     OBJECTIVE:     Blood pressure (!) 167/65, pulse (!) 48, temperature 98.8 °F (37.1 °C), temperature source Temporal, resp. rate 10, weight 133 lb 2.5 oz (60.4 kg), SpO2 99%, not currently  breastfeeding.     Temp:  [97.1 °F (36.2 °C)-99.2 °F (37.3 °C)] 98.8 °F (37.1 °C)  Pulse:  [48-69] 48  Resp:  [9-21] 10  BP: (128-171)/(57-75) 167/65  SpO2:  [97 %-100 %] 99 %  AO: (158-180)/(70-80) 175/72        Intake/Output:     Intake/Output Summary (Last 24 hours) at 9/15/2024 1201  Last data filed at 9/15/2024 1130      Gross per 24 hour   Intake 3238.9 ml   Output 4982.5 ml   Net -1743.6 ml              Last 3 Weights   09/15/24 0400 133 lb 2.5 oz (60.4 kg)   09/14/24 0444 132 lb 11.5 oz (60.2 kg)   09/12/24 1944 134 lb 14.7 oz (61.2 kg)   09/12/24 1926 134 lb 14.7 oz (61.2 kg)   09/04/24 0600 134 lb 3.2 oz (60.9 kg)   08/30/24 0928 132 lb (59.9 kg)   08/28/24 0213 133 lb 9.6 oz (60.6 kg)   08/28/24 0150 133 lb 8 oz (60.6 kg)   08/27/24 1840 132 lb (59.9 kg)         Exam   Gen: No acute distress, alert and oriented x3, no focal neurologic deficits  Heent: Surgical staples intact, clean and dry, drain in place with serosanguineous output,  Pulm: Lungs clear, normal respiratory effort  CV: Heart with regular rate and rhythm, no peripheral edema  Abd: Abdomen soft, nontender, nondistended, bowel sounds present  MSK: Full range of motion in extremities, no clubbing, no cyanosis  Skin: no rashes or lesions  Neuro: AO*3, right-sided facial droop, otherwise, neuro exam is grossly intact, still not talking as much.  Psyc: appropriate mood and affect      9/15 Neurosurgery      Chief Complaint:  S/p L EDAS for moyamoya     Subjective:  Mild improvement in language this morning and stronger in the right arm. EEG yesterday without seizures.     Objective/Physical Exam:     Vital Signs:  Blood pressure 137/71, pulse 71, temperature 98.1 °F (36.7 °C), temperature source Temporal, resp. rate 18, weight 133 lb 2.5 oz (60.4 kg), SpO2 99%, not currently breastfeeding.  Neurologic:   Significant expressive aphasia will say some words intermittently  Will follow simple commands bilaterally  PERRL, EOMi  Mild right facial  droop  Right upper extremity 4+ out of 5 otherwise full strength  Incision clean dry and intact  FINESSE bulb with bloody output     Labs:          Recent Labs   Lab 09/12/24  1942 09/13/24  0508 09/13/24  1617 09/14/24  0418 09/15/24  0407   RBC 3.64* 3.62* 3.61* 3.56* 3.64*   HGB 11.0* 11.1* 11.1* 10.9* 11.0*   HCT 32.3* 31.7* 31.4* 30.6* 32.8*   MCV 88.7 87.6 87.0 86.0 90.1   MCH 30.2 30.7 30.7 30.6 30.2   MCHC 34.1 35.0 35.4 35.6 33.5   RDW 12.0 11.9 12.0 12.1 12.3   NEPRELIM 2.54 2.12 7.91*  --   --    WBC 4.8 4.4 8.8 10.3 10.2   .0 213.0 212.0 286.0 289.0               Recent Labs   Lab 09/13/24  1617 09/14/24  0418 09/15/24  0406   * 106* 101*   BUN <5* 6* 7*   CREATSERUM 0.59 0.63 0.64   EGFRCR 108 106 106   CA 8.0* 8.4* 9.0    142 140   K 3.6 3.4* 3.6  3.6   * 110 106   CO2 21.0 23.0 28.0      Assessment:  54-year-old POD#2 s/p left-sided EDAS for symptomatic moyamoya disease     Plan:  Q2' neurochecks  Continue drain   Pain control  -180  Maintain euvolemia  Continue Keppra      9/16 IM     Assessment/Plan:      Active Problems:    Cerebrovascular accident (HCC)    Moyamoya    Moyamoya disease    Aphasia    Facial droop    Hypokalemia    Anemia    Atonic constipation           Patient is a 54 year old female with PMH sig for ADHD who presented for elective craniotomy for moyamoya disease.  She is status post EDAS.     Recent CVA  Moyamoya disease  Status post EDAS, POD 3  - Neurochecks  - Pain management per neurosurgery  - PT/OT/speech evaluation-stable  - Brown to be maintained  - BP parameters systolic 120-180, needed brandon for BP management-plan to wean neotoday  -Keppra 500 mgTwice dailyx 7 days postsurgery  -Neurosurgery and neurointensivist following     R sided facial droop-improved  R sided weakness- improved  - frequent neurochecks,   - adequate BP control  - Speech eval-diet advanced     Bradycardia  - noted on tele, patient asymptomatic  - pt has \"erratic HR\" per  family, likely high in the setting of adderall  - obtain EKG-reviewed, no blocks noted  - telemetry     ADHD  - Currently hold home meds        Prophylaxis:   DVT with scd, heparin subcu  Atrophy Prophylaxis PT/OT  Lines/Monitors:      Dispo: CCU  Code status: Full     Patient and/or patient's family given opportunity to ask questions and note understanding and agreeing with therapeutic plan as outlined     Thank you for allowing me to participate in the care of this patient.      Thank You,  DO MICKEY Haji Hospitalist  Pager   Answering Service number: [phone]         Subjective:      Sitting in a chair.   at bedside.  Patient is able to phonate more words today.  She will be advanced on a diet.  Overall symptoms are improving.  Her pain is controlled.  Drain has been removed.  She is very tired because she has trouble sleeping at night.     OBJECTIVE:     Blood pressure 120/62, pulse 69, temperature 97.6 °F (36.4 °C), temperature source Temporal, resp. rate 11, weight 133 lb 2.5 oz (60.4 kg), SpO2 97%, not currently breastfeeding.     Temp:  [97.4 °F (36.3 °C)-99.8 °F (37.7 °C)] 97.6 °F (36.4 °C)  Pulse:  [47-78] 69  Resp:  [10-18] 11  BP: (108-174)/(53-98) 120/62  SpO2:  [95 %-100 %] 97 %  AO: (126-186)/(62-82) 143/68        Intake/Output:     Intake/Output Summary (Last 24 hours) at 9/16/2024 1307  Last data filed at 9/16/2024 1200      Gross per 24 hour   Intake 3651.8 ml   Output 3455 ml   Net 196.8 ml              Last 3 Weights   09/15/24 0400 133 lb 2.5 oz (60.4 kg)   09/14/24 0444 132 lb 11.5 oz (60.2 kg)   09/12/24 1944 134 lb 14.7 oz (61.2 kg)   09/12/24 1926 134 lb 14.7 oz (61.2 kg)   09/04/24 0600 134 lb 3.2 oz (60.9 kg)   08/30/24 0928 132 lb (59.9 kg)   08/28/24 0213 133 lb 9.6 oz (60.6 kg)   08/28/24 0150 133 lb 8 oz (60.6 kg)   08/27/24 1840 132 lb (59.9 kg)         Exam   Gen: No acute distress, alert and oriented x3, no focal neurologic deficits  Heent: Surgical staples  intact, clean and dry, drain has been removed   pulm: Lungs clear, normal respiratory effort  CV: Heart with regular rate and rhythm, no peripheral edema  Abd: Abdomen soft, nontender, nondistended, bowel sounds present  MSK: Full range of motion in extremities, no clubbing, no cyanosis  Skin: no rashes or lesions  Neuro: AO*3, right-sided facial droop, otherwise, neuro exam is grossly intact, talking a little bit more today   Psyc: appropriate mood and affect        Data Review:       Labs:               Recent Labs   Lab 09/12/24  1942 09/13/24  0508 09/13/24  1617 09/14/24  0418 09/15/24  0407 09/16/24  0431   RBC 3.64* 3.62* 3.61* 3.56* 3.64* 3.43*   HGB 11.0* 11.1* 11.1* 10.9* 11.0* 10.5*   HCT 32.3* 31.7* 31.4* 30.6* 32.8* 30.2*   MCV 88.7 87.6 87.0 86.0 90.1 88.0   MCH 30.2 30.7 30.7 30.6 30.2 30.6   MCHC 34.1 35.0 35.4 35.6 33.5 34.8   RDW 12.0 11.9 12.0 12.1 12.3 12.2   NEPRELIM 2.54 2.12 7.91*  --   --   --    WBC 4.8 4.4 8.8 10.3 10.2 7.0   .0 213.0 212.0 286.0 289.0 235.0                  Recent Labs   Lab 09/14/24  0418 09/15/24  0406 09/16/24  0431   * 101* 101*   BUN 6* 7* 7*   CREATSERUM 0.63 0.64 0.52*   EGFRCR 106 106 111   CA 8.4* 9.0 9.2    140 140   K 3.4* 3.6  3.6 3.7    106 106   CO2 23.0 28.0 26.0             9/17 IM    Active Problems:    Cerebrovascular accident (HCC)    Moyamoya    Moyamoya disease    Aphasia    Facial droop    Hypokalemia    Anemia    Atonic constipation           Patient is a 54 year old female with PMH sig for ADHD who presented for elective craniotomy for moyamoya disease.  She is status post EDAS.     Recent CVA  Moyamoya disease  Status post EDAS, POD 4  - Neurochecks  - Pain management per neurosurgery  - PT/OT/speech evaluation-stable  - Brown to be maintained  - BP parameters systolic 120-180, initially weaned off of brandon, however, BP dropped and patient was symptomatic, brandon reinitiated  -Keppra 500 mgTwice dailyx 7 days  postsurgery  -Neurosurgery and neurointensivist following     R sided facial droop-improved  R sided weakness- improved  - frequent neurochecks,   - adequate BP control  - Speech eval-diet advanced     Hypotension  Bradycardia  Right-sided symptoms  - Likely in the setting of hypotension and bradycardia  - Symptoms have resolved as of 9/17  - Continue to maintain brandon-  -Every 2h neurochecks per neurosurgery     Bradycardia  - noted on tele, patient asymptomatic  - pt has \"erratic HR\" per family, likely high in the setting of adderall  - obtain EKG-reviewed, no blocks noted  - telemetry     ADHD  - Currently hold home meds        Prophylaxis:   DVT with scd, heparin subcu  Atrophy Prophylaxis PT/OT  Lines/Monitors:      Dispo: CCU  Code status: Full     Patient and/or patient's family given opportunity to ask questions and note understanding and agreeing with therapeutic plan as outlined     Thank you for allowing me to participate in the care of this patient.      Thank You,  Stefani Oden DO     [facility] [place] [facility]  Pager   Answering Service number: [phone]         Subjective:      AM, patient was sitting in a chair, her blood pressure dropped, heart rate dropped and she was symptomatic and had difficult time moving her right side.  However after reinitiating brandon-, patient is able to move her right hand and right leg.  When I evaluated the patient, she was laying in bed.  She is able to talk but talks slowly.     OBJECTIVE:     Blood pressure 121/63, pulse 60, temperature 98.1 °F (36.7 °C), temperature source Temporal, resp. rate 10, weight 133 lb 2.5 oz (60.4 kg), SpO2 97%, not currently breastfeeding.     Temp:  [97.4 °F (36.3 °C)-99.9 °F (37.7 °C)] 98.1 °F (36.7 °C)  Pulse:  [44-84] 60  Resp:  [9-20] 10  BP: ()/(57-82) 121/63  SpO2:  [94 %-100 %] 97 %  AO: ()/(28-88) 150/77        Intake/Output:     Intake/Output Summary (Last 24 hours) at 9/17/2024 3384  Last data filed at 9/17/2024 1030       Gross per 24 hour   Intake 889 ml   Output 2000 ml   Net -1111 ml              Last 3 Weights   09/15/24 0400 133 lb 2.5 oz (60.4 kg)   09/14/24 0444 132 lb 11.5 oz (60.2 kg)   09/12/24 1944 134 lb 14.7 oz (61.2 kg)   09/12/24 1926 134 lb 14.7 oz (61.2 kg)   09/04/24 0600 134 lb 3.2 oz (60.9 kg)   08/30/24 0928 132 lb (59.9 kg)   08/28/24 0213 133 lb 9.6 oz (60.6 kg)   08/28/24 0150 133 lb 8 oz (60.6 kg)   08/27/24 1840 132 lb (59.9 kg)         Exam   Gen: No acute distress, alert and oriented x3, no focal neurologic deficits  Heent: Surgical staples intact, clean and dry, drain has been removed   pulm: Lungs clear, normal respiratory effort  CV: Heart with regular rate and rhythm, no peripheral edema  Abd: Abdomen soft, nontender, nondistended, bowel sounds present  MSK: Full range of motion in extremities, no clubbing, no cyanosis  Skin: no rashes or lesions  Neuro: AO*3, right-sided facial droop- improved, otherwise, neuro exam is grossly intact, talking a little bit more today - making sentences but slow  Psyc: appropriate mood and affect        Data Review:       Labs:               Recent Labs   Lab 09/13/24  0508 09/13/24  1617 09/14/24  0418 09/15/24  0407 09/16/24  0431 09/17/24  0412   RBC 3.62* 3.61*   < > 3.64* 3.43* 3.46*   HGB 11.1* 11.1*   < > 11.0* 10.5* 10.7*   HCT 31.7* 31.4*   < > 32.8* 30.2* 29.3*   MCV 87.6 87.0   < > 90.1 88.0 84.7   MCH 30.7 30.7   < > 30.2 30.6 30.9   MCHC 35.0 35.4   < > 33.5 34.8 36.5   RDW 11.9 12.0   < > 12.3 12.2 11.9   NEPRELIM 2.12 7.91*  --   --   --  3.32   WBC 4.4 8.8   < > 10.2 7.0 6.2   .0 212.0   < > 289.0 235.0 258.0    < > = values in this interval not displayed.        MEDICATIONS ADMINISTERED IN LAST 1 DAY:  aspirin tab 325 mg       Date Action Dose Route User    9/17/2024 1007 Given 325 mg Oral Anuradha Jay, RN          atorvastatin (Lipitor) tab 40 mg       Date Action Dose Route User    9/16/2024 2025 Given 40 mg Oral Alanna Sue  RN          heparin (Porcine) 5000 UNIT/ML injection 5,000 Units       Date Action Dose Route User    9/17/2024 1007 Given 5,000 Units Subcutaneous (Right Lower Abdomen) Anuradha Jay RN    9/16/2024 2025 Given 5,000 Units Subcutaneous (Right Lower Abdomen) Alanna Sue RN          HYDROcodone-acetaminophen (Norco)  MG per tab 1 tablet       Date Action Dose Route User    9/17/2024 1034 Given 1 tablet Oral Anuradha Jay RN    9/17/2024 0527 Given 1 tablet Oral Trevor Ascencio RN    9/16/2024 1744 Given 1 tablet Oral Ana Paula Short RN          lactated ringers IV bolus 1,000 mL       Date Action Dose Route User    9/16/2024 2144 New Bag 1,000 mL Intravenous Alanna Sue RN          levETIRAcetam (Keppra) tab 500 mg       Date Action Dose Route User    9/17/2024 0610 Given 500 mg Oral Alanna Sue RN    9/16/2024 1744 Given 500 mg Oral Ana Paula Short RN          polyethylene glycol (PEG 3350) (Miralax) 17 g oral packet 17 g       Date Action Dose Route User    9/17/2024 1007 Given 17 g Oral Anuradha Jay RN          phenylephrine in sodium chloride 0.9% (Evelio-Synephrine) 50 mg/250mL infusion premix       Date Action Dose Route User    9/17/2024 1500 Rate/Dose Change 25 mcg/min Intravenous Anuradha Jay RN    9/17/2024 1423 Rate/Dose Change 30 mcg/min Intravenous Anuradha Jay RN    9/17/2024 1300 Rate/Dose Change 25 mcg/min Intravenous Anuradha Jay RN    9/17/2024 1200 Rate/Dose Verify 20 mcg/min Intravenous Anuradha Jay RN    9/17/2024 1045 Rate/Dose Change 20 mcg/min Intravenous Anuradha Jay RN    9/17/2024 1030 Rate/Dose Change 25 mcg/min Intravenous Anuradha Jay RN    9/17/2024 0800 Rate/Dose Verify 30 mcg/min Intravenous Anuradha Jay RN    9/17/2024 0745 Rate/Dose Change 30 mcg/min Intravenous Anuradha Jay RN    9/17/2024 0708 Rate/Dose Change 35 mcg/min Intravenous Alanna Sue  S, RN    9/17/2024 0650 Rate/Dose Change 40 mcg/min Intravenous Scates, Alanna S, RN    9/17/2024 0605 Rate/Dose Change 30 mcg/min Intravenous Scates, Alanna S, RN    9/17/2024 0356 Rate/Dose Change 25 mcg/min Intravenous Scates, Alanna S, RN    9/17/2024 0319 Rate/Dose Change 30 mcg/min Intravenous Scates, Alanna S, RN    9/17/2024 0038 Rate/Dose Change 25 mcg/min Intravenous Scates, Alanna S, RN    9/16/2024 2342 Rate/Dose Change 20 mcg/min Intravenous Scates, Alanna S, RN    9/16/2024 2209 Rate/Dose Change 25 mcg/min Intravenous Scates, Alanna S, RN    9/16/2024 2150 Rate/Dose Change 20 mcg/min Intravenous Scates, Alanna S, RN    9/16/2024 2135 Rate/Dose Change 30 mcg/min Intravenous Scates, Alanna S, RN    9/16/2024 2051 Restarted 20 mcg/min Intravenous Scates, Alanna S, RN    9/16/2024 1800 Rate/Dose Change 10 mcg/min Intravenous Ana Paula Short, MARIANN          potassium chloride (Klor-Con M20) tab 40 mEq       Date Action Dose Route User    9/17/2024 1410 Given 40 mEq Oral Anuradha Jay RN    9/17/2024 1006 Given 40 mEq Oral Anuradha Jay RN          sennosides (Senokot) tab 17.2 mg       Date Action Dose Route User    9/17/2024 1007 Given 17.2 mg Oral Anuradha Jay RN    9/16/2024 2025 Given 17.2 mg Oral Alanna Sue RN          sodium chloride 0.9 % IV bolus 1,000 mL       Date Action Dose Route User    9/17/2024 0944 New Bag 1,000 mL Intravenous Anuradha Jay RN                                      levETIRAcetam (Keppra) 500 mg/5mL injection 1,000 mg  Dose: 1,000 mg  Freq: Once Route: IV  Start: 09/13/24 2030 End: 09/13/24 2032   Admin Instructions:   Administer slow IV push over 2 minutes         2030 VG-Given            levETIRAcetam (Keppra) 500 mg/5mL injection 500 mg  Dose: 500 mg  Freq: Every 12 hours Route: IV  Start: 09/13/24 1615 End: 09/16/24 1046   Admin Instructions:   Administer slow IV push over 2 minutes         1620 PJ-Given      0440  VG-Given     1555 ET-Given      0436 VG-Given     1731 MP-Given      0430 MS-Given     1046-D/C'd                         potassium chloride (Klor-Con M20) tab 40 mEq  Dose: 40 mEq  Freq: Every 4 hours Route: OR  Start: 09/17/24 1000 End: 09/17/24 1410   Admin Instructions:   Do not crush             1006 KH-Given     1410 KH-Given        potassium chloride 40 mEq in 250mL sodium chloride 0.9% IVPB premix  Dose: 40 mEq  Freq: Once Route: IV  Last Dose: 40 mEq (09/14/24 0641)  Start: 09/14/24 0515 End: 09/14/24 1041          0641 VG-New Bag                              0944 KH-New Bag     1145 KH-Stopped                  sodium chloride 0.9 % IV bolus 1,000 mL  Dose: 1,000 mL  Freq: Once Route: IV  Last Dose: 1,000 mL (09/13/24 2022)  Start: 09/13/24 2045 End: 09/13/24 2122 2022 VG-New Bag            sodium chloride 0.9 % IV bolus 500 mL  Dose: 500 mL  Freq: Once Route: IV  Last Dose: 500 mL (09/13/24 2131)  Start: 09/13/24 2030 End: 09/13/24 2201 2131 VG-New Bag            sodium chloride 0.9 % IV bolus 500 mL  Dose: 500 mL  Freq: Once Route: IV  Last Dose: 500 mL (09/13/24 1902)  Start: 09/13/24 1915 End: 09/13/24 2002 1902 PJ-New Bag                    Vitals (last day)       Date/Time Temp Pulse Resp BP SpO2 Weight O2 Device O2 Flow Rate (L/min) [facility]    09/17/24 1500 -- 64 14 149/75 100 % -- None (Room air) --     09/17/24 1400 -- 60 10 121/63 97 % -- None (Room air) --     09/17/24 1300 -- 61 16 145/82 94 % -- None (Room air) --     09/17/24 1200 98.1 °F (36.7 °C) 57 10 138/70 97 % -- None (Room air) --     09/17/24 1100 -- 61 12 140/81 97 % -- None (Room air) --     09/17/24 1000 -- 78 16 -- 99 % -- None (Room air) --     09/17/24 0900 -- 60 12 155/63 97 % -- None (Room air) --     09/17/24 0800 98.2 °F (36.8 °C) 63 13 156/71 96 % -- None (Room air) --     09/17/24 0700 -- 63 14 152/69 96 % -- -- -- MS    09/17/24 0600 -- 63 9 121/65 97 % -- -- -- MS    09/17/24 0500 -- 62  11 141/67 99 % -- -- -- MS    09/17/24 0400 97.8 °F (36.6 °C) 58 9 153/75 97 % -- None (Room air) -- MS    09/17/24 0356 -- 61 9 -- 98 % -- -- -- MS    09/17/24 0323 -- 54 18 -- 99 % -- -- -- MS    09/17/24 0319 -- 60 20 137/67 100 % -- -- -- MS    09/17/24 0300 -- 56 9 138/71 99 % -- -- -- MS    09/17/24 0200 -- 61 10 140/67 97 % -- -- -- MS    09/17/24 0100 -- 65 11 132/57 99 % -- -- -- MS    09/17/24 0040 -- 66 16 -- 98 % -- -- -- MS    09/17/24 0037 -- 65 11 -- 97 % -- -- -- MS    09/17/24 0000 98 °F (36.7 °C) 69 13 152/69 98 % -- None (Room air) -- MS    09/16/24 2341 -- 75 13 -- 99 % -- -- -- MS    09/16/24 2300 -- 65 13 165/78 99 % -- -- -- MS    09/16/24 2209 -- 63 19 -- 97 % -- -- -- MS    09/16/24 2200 -- 65 11 148/80 98 % -- -- -- MS    09/16/24 2150 -- 56 17 -- 98 % -- -- -- MS    09/16/24 2145 -- 61 15 -- 99 % -- -- -- MS    09/16/24 2140 -- 64 16 -- 98 % -- -- -- MS    09/16/24 2135 -- 67 16 -- 99 % -- -- -- MS    09/16/24 2100 -- 60 12 115/76 96 % -- -- -- MS    09/16/24 2055 -- 55 12 -- 96 % -- -- -- MS    09/16/24 2054 -- 60 13 -- 99 % -- -- -- MS    09/16/24 2053 -- 56 13 -- 100 % -- -- -- MS    09/16/24 2052 -- 51 13 -- 99 % -- -- -- MS    09/16/24 2051 -- 47 18 -- 97 % -- -- -- MS    09/16/24 2050 -- 48 19 -- 96 % -- -- -- MS    09/16/24 2049 -- 44 15 92/63 95 % -- -- -- MS    09/16/24 2048 -- 82 20 92/63 99 % -- -- -- MS    09/16/24 2047 -- 84 18 92/63 100 % -- -- -- MS    09/16/24 2046 -- 80 16 -- 99 % -- -- -- MS    09/16/24 2000 99.9 °F (37.7 °C) 75 11 116/65 95 % -- None (Room air) -- MS    09/16/24 1900 -- 67 13 127/66 99 % -- -- -- [facility]    09/16/24 1800 -- 72 10 133/80 95 % -- -- -- FINESSE    09/16/24 1700 -- 66 13 138/74 98 % -- -- -- FINESSE    09/16/24 1600 97.4 °F (36.3 °C) 61 11 124/69 98 % -- None (Room air) -- FINESSE    09/16/24 1500 -- 61 11 122/73 99 % -- -- -- FINESSE    09/16/24 1400 -- 62 12 115/67 100 % -- -- -- FINESSE    09/16/24 1300 -- 69 11 -- 96 % -- -- -- FINESSE    09/16/24 1257 -- 69 11 -- 97 % --  -- --     09/16/24 1256 -- 67 12 -- 97 % -- -- --     09/16/24 1230 -- 70 16 -- 96 % -- -- --     09/16/24 1215 -- 72 11 -- 96 % -- -- --     09/16/24 1200 97.6 °F (36.4 °C) 58 12 120/62 97 % -- None (Room air) --     09/16/24 1100 -- 74 10 133/65 98 % -- -- --     09/16/24 1030 -- 58 11 -- 98 % -- -- --     09/16/24 1015 -- 58 11 -- 98 % -- -- --     09/16/24 1005 -- 62 12 -- 100 % -- -- --     09/16/24 1000 -- 54 13 165/70 98 % -- -- --     09/16/24 0915 -- 56 12 -- 95 % -- -- --     09/16/24 0900 -- 78 13 108/65 98 % -- -- --     09/16/24 0800 97.4 °F (36.3 °C) 50 14 138/64 97 % -- None (Room air) --     09/16/24 0700 -- 48 12 143/64 98 % -- -- -- [facility]    09/16/24 0600 -- 54 12 129/61 98 % -- -- -- [facility]    09/16/24 0500 -- 58 11 163/84 100 % -- -- -- [facility]    09/16/24 0400 99.3 °F (37.4 °C) 55 11 -- 98 % -- None (Room air) -- [facility]    09/16/24 0300 -- 60 16 169/72 100 % -- -- -- [facility]    09/16/24 0200 -- 50 10 161/65 98 % -- -- -- [facility]    09/16/24 0100 -- 50 10 153/66 99 % -- -- -- [facility]    09/16/24 0000 99.1 °F (37.3 °C) 51 13 152/66 99 % -- None (Room air) -- [facility]                09/15/24 1300 -- 45 Abnormal  11 164/64 Abnormal  99 % -- -- -- ET   09/15/24 1230 -- 59 17 143/70 99 % -- -- -- ET   09/15/24 1215 -- 55 11 -- 100 % -- -- -- ET   09/15/24 1200 98.1 °F (36.7 °C) 48 Abnormal  10 167/65 Abnormal  99 % -- None (Room air) -- ET       09/14/24 1800 -- 58 12 146/60 100 % -- -- -- ET   09/14/24 1700 -- 61 13 -- 99 % -- -- -- ET   09/14/24 1600 99.2 °F (37.3 °C) 57 14 155/64 99 % -- None (Room air) -- ET   09/14/24 1500 -- 66 13 -- 99 % -- -- -- ET   09/14/24 1400 -- 62 15 144/64 99 % -- -- -- ET   09/14/24 1300 -- 63 12 -- 99 % -- -- -- ET   09/14/24 1200 99.9 °F (37.7 °C) 68 14 162/72 Abnormal  99 % -- None (Room air) -- ET

## 2024-09-17 NOTE — PROGRESS NOTES
[facility] Hospitalist Progress Note     CC: Hospital Follow up    PCP: Mildred Steward MD       Assessment/Plan:     Active Problems:    Cerebrovascular accident (HCC)    Moyamoya    Moyamoya disease    Aphasia    Facial droop    Hypokalemia    Anemia    Atonic constipation        Patient is a 54 year old female with PMH sig for ADHD who presented for elective craniotomy for moyamoya disease.  She is status post EDAS.    Recent CVA  Moyamoya disease  Status post EDAS, POD 4  - Neurochecks  - Pain management per neurosurgery  - PT/OT/speech evaluation-stable  - Brown to be maintained  - BP parameters systolic 120-180, initially weaned off of brandon, however, BP dropped and patient was symptomatic, brandon reinitiated  -Keppra 500 mgTwice dailyx 7 days postsurgery  -Neurosurgery and neurointensivist following    R sided facial droop-improved  R sided weakness- improved  - frequent neurochecks,   - adequate BP control  - Speech eval-diet advanced    Hypotension  Bradycardia  Right-sided symptoms  - Likely in the setting of hypotension and bradycardia  - Symptoms have resolved as of 9/17  - Continue to maintain brandon-  -Every 2h neurochecks per neurosurgery    Bradycardia  - noted on tele, patient asymptomatic  - pt has \"erratic HR\" per family, likely high in the setting of adderall  - obtain EKG-reviewed, no blocks noted  - telemetry    ADHD  - Currently hold home meds      Prophylaxis:   DVT with scd, heparin subcu  Atrophy Prophylaxis PT/OT  Lines/Monitors:     Dispo: CCU  Code status: Full    Patient and/or patient's family given opportunity to ask questions and note understanding and agreeing with therapeutic plan as outlined    Thank you for allowing me to participate in the care of this patient.     Thank You,  Stefani Oden DO    [facility] Hospitalist  Pager   Answering Service number: [phone]       Subjective:     AM, patient was sitting in a chair, her blood pressure dropped, heart rate dropped and she was symptomatic and had  difficult time moving her right side.  However after reinitiating brandon-, patient is able to move her right hand and right leg.  When I evaluated the patient, she was laying in bed.  She is able to talk but talks slowly.    OBJECTIVE:    Blood pressure 121/63, pulse 60, temperature 98.1 °F (36.7 °C), temperature source Temporal, resp. rate 10, weight 133 lb 2.5 oz (60.4 kg), SpO2 97%, not currently breastfeeding.    Temp:  [97.4 °F (36.3 °C)-99.9 °F (37.7 °C)] 98.1 °F (36.7 °C)  Pulse:  [44-84] 60  Resp:  [9-20] 10  BP: ()/(57-82) 121/63  SpO2:  [94 %-100 %] 97 %  AO: ()/(28-88) 150/77      Intake/Output:    Intake/Output Summary (Last 24 hours) at 9/17/2024 1444  Last data filed at 9/17/2024 1030  Gross per 24 hour   Intake 889 ml   Output 2000 ml   Net -1111 ml       Last 3 Weights   09/15/24 0400 133 lb 2.5 oz (60.4 kg)   09/14/24 0444 132 lb 11.5 oz (60.2 kg)   09/12/24 1944 134 lb 14.7 oz (61.2 kg)   09/12/24 1926 134 lb 14.7 oz (61.2 kg)   09/04/24 0600 134 lb 3.2 oz (60.9 kg)   08/30/24 0928 132 lb (59.9 kg)   08/28/24 0213 133 lb 9.6 oz (60.6 kg)   08/28/24 0150 133 lb 8 oz (60.6 kg)   08/27/24 1840 132 lb (59.9 kg)       Exam   Gen: No acute distress, alert and oriented x3, no focal neurologic deficits  Heent: Surgical staples intact, clean and dry, drain has been removed   pulm: Lungs clear, normal respiratory effort  CV: Heart with regular rate and rhythm, no peripheral edema  Abd: Abdomen soft, nontender, nondistended, bowel sounds present  MSK: Full range of motion in extremities, no clubbing, no cyanosis  Skin: no rashes or lesions  Neuro: AO*3, right-sided facial droop- improved, otherwise, neuro exam is grossly intact, talking a little bit more today - making sentences but slow  Psyc: appropriate mood and affect      Data Review:       Labs:     Recent Labs   Lab 09/13/24  0508 09/13/24  1617 09/14/24  0418 09/15/24  0407 09/16/24  0431 09/17/24  0412   RBC 3.62* 3.61*   < > 3.64* 3.43*  3.46*   HGB 11.1* 11.1*   < > 11.0* 10.5* 10.7*   HCT 31.7* 31.4*   < > 32.8* 30.2* 29.3*   MCV 87.6 87.0   < > 90.1 88.0 84.7   MCH 30.7 30.7   < > 30.2 30.6 30.9   MCHC 35.0 35.4   < > 33.5 34.8 36.5   RDW 11.9 12.0   < > 12.3 12.2 11.9   NEPRELIM 2.12 7.91*  --   --   --  3.32   WBC 4.4 8.8   < > 10.2 7.0 6.2   .0 212.0   < > 289.0 235.0 258.0    < > = values in this interval not displayed.         Recent Labs   Lab 09/15/24  0406 09/16/24  0431 09/17/24  0412   * 101* 99   BUN 7* 7* 7*   CREATSERUM 0.64 0.52* 0.51*   EGFRCR 106 111 112   CA 9.0 9.2 9.7    140 140   K 3.6  3.6 3.7 3.6    106 105   CO2 28.0 26.0 28.2       Recent Labs   Lab 09/13/24  0508 09/13/24  1617   ALT 17 15   AST 13 15   ALB 4.1 3.9         Imaging:  No results found.      Meds:      sennosides  17.2 mg Oral BID    polyethylene glycol (PEG 3350)  17 g Oral Daily    levETIRAcetam  500 mg Oral BID    atorvastatin  40 mg Oral Nightly    heparin  5,000 Units Subcutaneous 2 times per day    aspirin  325 mg Oral Daily    [Transfer Hold] vortioxetine  10 mg Oral Nightly    [Transfer Hold] progesterone  100 mg Oral Nightly      phenylephrine 30 mcg/min (09/17/24 1423)       ondansetron    HYDROcodone-acetaminophen **OR** HYDROcodone-acetaminophen    morphINE    [Transfer Hold] acetaminophen    [Transfer Hold] bisacodyl    [Transfer Hold] fleet enema

## 2024-09-17 NOTE — PROGRESS NOTES
[facility] hospital  Neurosurgery Progress Note    Isabella Kevin Patient Status:  Inpatient    1970 MRN [id number]   Location [facility] 6NE-A Attending Cornelio Guzman MD   Hosp Day # 5 PCP Mildred Steward MD     Chief Complaint:  POD #4 L EDAS for moyamoya    Subjective:  Syncopal event last night. Pressors restarted and placed on flat bedrest. Speech improving today.    Objective/Physical Exam:    Vital Signs:  Blood pressure 145/82, pulse 61, temperature 98.1 °F (36.7 °C), temperature source Temporal, resp. rate 16, weight 133 lb 2.5 oz (60.4 kg), SpO2 94%, not currently breastfeeding.  Neurologic:   Delayed speech with quiet voice. Oriented x 3.   Follows commands briskly  PERRL, EOMi  Right facial droop  Right triceps and  4/5 otherwise 4+/5 in R biceps and deltoid  5/5 strength LUE and BLE upper extremity 4+/5 R triceps and  ow 5/5 all other    Incision clean dry and intact      Labs:  Recent Labs   Lab 24  0508 24  1617 24  0418 09/15/24  0407 24  0431 24  0412   RBC 3.62* 3.61*   < > 3.64* 3.43* 3.46*   HGB 11.1* 11.1*   < > 11.0* 10.5* 10.7*   HCT 31.7* 31.4*   < > 32.8* 30.2* 29.3*   MCV 87.6 87.0   < > 90.1 88.0 84.7   MCH 30.7 30.7   < > 30.2 30.6 30.9   MCHC 35.0 35.4   < > 33.5 34.8 36.5   RDW 11.9 12.0   < > 12.3 12.2 11.9   NEPRELIM 2.12 7.91*  --   --   --  3.32   WBC 4.4 8.8   < > 10.2 7.0 6.2   .0 212.0   < > 289.0 235.0 258.0    < > = values in this interval not displayed.       Recent Labs   Lab 09/15/24  0406 24  0431 24  0412   * 101* 99   BUN 7* 7* 7*   CREATSERUM 0.64 0.52* 0.51*   EGFRCR 106 111 112   CA 9.0 9.2 9.7    140 140   K 3.6  3.6 3.7 3.6    106 105   CO2 28.0 26.0 28.2     Assessment:  54-year-old  s/p left-sided EDAS for symptomatic moyamoya disease    Plan:  -Optimization per NCC  -Q2' neurochecks  -Pain control  -Slowly sit patient up while watching exam  -Ok to shower  and wash hair (9/17)  -SCDs and SQH for DVT ppx  -Continue daily ASA 325mg  -ADAT  -PT/OT/OOB  -Maintain euvolemia  -Continue Keppra  -Dispo planning     Dr Guzman to follow     ARLENE Matias   [facility]  9/17/2024    Is this a shared or split note between Advanced Practice Provider and Physician? Yes

## 2024-09-17 NOTE — PLAN OF CARE
Neuro checks ordered q 2 hrs. SBP goal at 2000 110-180 (orders to decrease every 4 hours by 10 points until  if no neuro changes). Pt ambulated to bathroom with SBA at 2035 and then to chair at 2040 to eat dinner. Pt instructed to hit call light when finished to get washed up and brush teeth. Pt hit call light at 2046 and tech called this RN to room because pt nonverbal. Upon assessment at 2047, pt's HR dropped to 40's from 80's and BP dropped from 110's/60's to 50's/30's, minimally responsive, nonverbal, no mvmt to RUE noted. Pt laid flat in chair and brandno gtt along with bolus started. APN in unit and immediately to bedside. Pt's BP improved by 2055 and able to move RUE weakly but expressive aphasia noted. Dr Guzman updated and orders received to keep flat, bedrest, SBP goal 150-180, and 500mL bolus. Pt moved back to bed via lifting by 3 RN's. Purwick placed. Aphasia and weakness resolved by 2300. No further episodes overnight.  updated via phone.

## 2024-09-18 NOTE — DIETARY NOTE
[facility]   part of [facility]    NUTRITION ASSESSMENT    Pt meets severe malnutrition criteria at this time.    CRITERIA FOR MALNUTRITION DIAGNOSIS:  Criteria for severe malnutrition diagnosis: acute illness/injury related to wt loss greater than 2% in 1 week and energy intake less than 50% for greater than 5 days      NUTRITION INTERVENTION:    Meal and Snacks - Monitor and encourage adequate PO intake. Regular/general  Medical Food Supplements - Magic Cup BID berry. Rationale/use for oral supplements discussed. Please send at breakfast and dinner even if tray is not ordered by patient.  Vitamin and Mineral Supplements - adding Multivitamin with minerals  Coordination of Nutrition Care - GI/Surgery consult for nutrition support/diet advancement       PATIENT STATUS: 09/18/24 54/F admitted d/t elective craniotomy surgery. Screened d/t consult for low appetite. Surgery went well, improvement in arm strength and speech. Pt has become hypotensive needing brandon-synephrine. Pt reports appetite has been low since surgery. States she only takes a few bites of food. Family in room brought food for her today, but not eating much. Per EMR pat has lost significant wt of 8 lbs (6%) within 12 days. Offered EPHP but refused. Pt willing to try Magic cup berry flavored BID. At F/U check for adherence to eating ONS.    PMH: ADHD, moyamoya disease with L ischemic stroke, Vit D, osteoporosis  Procedure: Left-sided frontotemporal foocwubdf-njso-rwuxx-synangiosis (EDAS) indirect bypass 9/13      ANTHROPOMETRICS:  Ht:  157.5 cm (5' 2\")  Wt: 57.3 kg (126 lb 5.2 oz).   BMI: Body mass index is 23.1 kg/m².  IBW: 50 kg      WEIGHT HISTORY:   Weight loss: Yes, Severe Wt loss of 8 lbs, 6%, over 12 days     Wt Readings from Last 10 Encounters:   09/18/24 57.3 kg (126 lb 5.2 oz)   09/04/24 60.9 kg (134 lb 3.2 oz)   08/28/24 60.6 kg (133 lb 9.6 oz)   06/21/22 59 kg (130 lb)   02/09/22 58 kg (127 lb 12.8 oz)   08/02/21 59 kg (130 lb)    03/17/21 59.4 kg (131 lb)   07/30/19 59 kg (130 lb)   05/14/19 56.7 kg (125 lb)   04/23/18 56.7 kg (125 lb)        NUTRITION:  Diet:       Procedures    Regular/General diet Texture Consistency: Regular; Is Patient on Accuchecks? No      Food Allergies: No  Cultural/Ethnic/Alevism Preferences Addressed: Yes    Percent Meals Eaten (last 3 days)       Date/Time Percent Meals Eaten (%)    09/16/24 0900 25 %    09/16/24 1200 25 %    09/16/24 2039 5 %    09/17/24 1030 50 %    09/17/24 1423 25 %    09/18/24 1100 75 %            GI system review: WNL Last BM Date: 09/18/24  Skin and wounds: head    NUTRITION RELATED PHYSICAL FINDINGS:     1. Body Fat/Muscle Mass: no wasting noted, malnutrition related to PO intake and weight loss     2. Fluid Accumulation: upper extremity edema     NUTRITION PRESCRIPTION: 57.3 kg  Calories: 0738-9839 calories/day (25-30 kcal/kg)  Protein:  grams protein/day (1.3-1.8 grams protein per kg)  Fluid: ~1 ml/kcal or per MD discretion    NUTRITION DIAGNOSIS/PROBLEM:  Malnutrition related to insufficient appetite resulting in inadequate nutrition intake as evidenced by documented/reported insufficient oral intake and documented/reported unintentional weight loss      MONITOR AND EVALUATE/NUTRITION GOALS:  PO intake of 75% of meals TID - New  PO intake of 75% of oral nutrition supplement/s - New  Weight stable within 1 to 2 lbs during admission - New      MEDICATIONS:  Senokot  Gtt: brandon 55 mcg/min    LABS:  Reviewed    Pt is at High nutrition risk    Owen Turner  Dietetic Intern  [id number]

## 2024-09-18 NOTE — PROGRESS NOTES
[facility] [place] [facility] Hospitalist Progress Note     CC: Hospital Follow up    PCP: Mildred Steward MD       Assessment/Plan:     Active Problems:    Cerebrovascular accident (HCC)    Moyamoya    Moyamoya disease    Aphasia    Facial droop    Hypokalemia    Anemia    Atonic constipation      Patient is a 54 year old female with PMH sig for ADHD who presented for elective craniotomy for moyamoya disease.  She is status post EDAS.    Recent CVA  Moyamoya disease  Status post EDAS, POD 5  - Neurochecks  - Pain management per neurosurgery  - PT/OT/speech evaluation-stable  - Brown to be maintained  - BP parameters systolic 120-180, initially weaned off of brandon, however, BP dropped and patient was symptomatic, brandon reinitiated, plan to wean off with midodrine  -Keppra 500 mgTwice dailyx 7 days postsurgery  -Neurosurgery and neurointensivist following    R sided facial droop-improved  R sided weakness- improved  - frequent neurochecks,   - adequate BP control  - Speech eval-diet advanced    Hypotension  Bradycardia  Right-sided symptoms  - Likely in the setting of hypotension and bradycardia  - Symptoms have resolved as of 9/17  - Continue to maintain brandon-  -Every 2h neurochecks per neurosurgery    Bradycardia  - noted on tele, patient asymptomatic  - pt has \"erratic HR\" per family, likely high in the setting of adderall  - obtain EKG-reviewed, no blocks noted  - telemetry    ADHD  - Currently hold home meds      Prophylaxis:   DVT with scd, heparin subcu  Atrophy Prophylaxis PT/OT  Lines/Monitors:     Dispo: CCU  Code status: Full    Patient and/or patient's family given opportunity to ask questions and note understanding and agreeing with therapeutic plan as outlined    Thank you for allowing me to participate in the care of this patient.     Thank You,  Stefani Oden DO    [facility] [place] [facility] Hospitalist  Pager   Answering Service number: [phone]       Subjective:     Seen and examined at bedside.  Remains on brandon-.  No new symptoms.   at  bedside.  Feels very tired.    OBJECTIVE:    Blood pressure (!) 162/132, pulse 61, temperature 97.5 °F (36.4 °C), temperature source Temporal, resp. rate 12, weight 126 lb 5.2 oz (57.3 kg), SpO2 96%, not currently breastfeeding.    Temp:  [97.5 °F (36.4 °C)-98.2 °F (36.8 °C)] 97.5 °F (36.4 °C)  Pulse:  [] 61  Resp:  [8-20] 12  BP: (120-193)/() 162/132  SpO2:  [93 %-100 %] 96 %  AO: (150-162)/(77-86) 150/86      Intake/Output:    Intake/Output Summary (Last 24 hours) at 9/18/2024 1335  Last data filed at 9/18/2024 0800  Gross per 24 hour   Intake 1598 ml   Output 1300 ml   Net 298 ml       Last 3 Weights   09/18/24 0800 126 lb 5.2 oz (57.3 kg)   09/15/24 0400 133 lb 2.5 oz (60.4 kg)   09/14/24 0444 132 lb 11.5 oz (60.2 kg)   09/12/24 1944 134 lb 14.7 oz (61.2 kg)   09/12/24 1926 134 lb 14.7 oz (61.2 kg)   09/04/24 0600 134 lb 3.2 oz (60.9 kg)   08/30/24 0928 132 lb (59.9 kg)   08/28/24 0213 133 lb 9.6 oz (60.6 kg)   08/28/24 0150 133 lb 8 oz (60.6 kg)   08/27/24 1840 132 lb (59.9 kg)       Exam   Gen: No acute distress, alert and oriented x3, no focal neurologic deficits, tired  Heent: Surgical staples intact, clean and dry, drain has been removed   pulm: Lungs clear, normal respiratory effort  CV: Heart with regular rate and rhythm, no peripheral edema  Abd: Abdomen soft, nontender, nondistended, bowel sounds present  MSK: Full range of motion in extremities   Skin: no rashes or lesions  Neuro: AO*3, right-sided facial droop- improved, otherwise, neuro exam is grossly intact, talking a little bit more today - making sentences but slow  Psyc: appropriate mood and affect      Data Review:       Labs:     Recent Labs   Lab 09/13/24  0508 09/13/24  1617 09/14/24  0418 09/16/24  0431 09/17/24  0412 09/18/24  0438   RBC 3.62* 3.61*   < > 3.43* 3.46* 3.89   HGB 11.1* 11.1*   < > 10.5* 10.7* 11.7*   HCT 31.7* 31.4*   < > 30.2* 29.3* 33.4*   MCV 87.6 87.0   < > 88.0 84.7 85.9   MCH 30.7 30.7   < > 30.6 30.9 30.1    MCHC 35.0 35.4   < > 34.8 36.5 35.0   RDW 11.9 12.0   < > 12.2 11.9 12.0   NEPRELIM 2.12 7.91*  --   --  3.32  --    WBC 4.4 8.8   < > 7.0 6.2 5.9   .0 212.0   < > 235.0 258.0 340.0    < > = values in this interval not displayed.         Recent Labs   Lab 09/16/24  0431 09/17/24  0412 09/17/24  1857 09/18/24  0438   * 99  --  95   BUN 7* 7*  --  7*   CREATSERUM 0.52* 0.51*  --  0.66   EGFRCR 111 112  --  105   CA 9.2 9.7  --  10.0    140  --  139   K 3.7 3.6 3.9 4.1    105  --  103   CO2 26.0 28.2  --  28.0       Recent Labs   Lab 09/13/24  0508 09/13/24  1617   ALT 17 15   AST 13 15   ALB 4.1 3.9         Imaging:  No results found.      Meds:      midodrine  5 mg Oral TID    [START ON 9/19/2024] multivitamin with minerals  1 tablet Oral Daily    sennosides  17.2 mg Oral BID    polyethylene glycol (PEG 3350)  17 g Oral Daily    levETIRAcetam  500 mg Oral BID    atorvastatin  40 mg Oral Nightly    heparin  5,000 Units Subcutaneous 2 times per day    aspirin  325 mg Oral Daily    [Transfer Hold] vortioxetine  10 mg Oral Nightly    [Transfer Hold] progesterone  100 mg Oral Nightly      phenylephrine 25 mcg/min (09/18/24 1315)       acetaminophen    magnesium hydroxide    ondansetron    HYDROcodone-acetaminophen **OR** HYDROcodone-acetaminophen    morphINE    [Transfer Hold] bisacodyl    [Transfer Hold] fleet enema

## 2024-09-18 NOTE — PHYSICAL THERAPY NOTE
PT treatment att'd - 162/85 in supine, 128/83 sitting EOB, then 120/77 post exercises sitting EOB. BP remained outside of parameters although pt not symptomatic and denied dizziness throughout. Cued to return to supine, RN aware.

## 2024-09-18 NOTE — PLAN OF CARE
Neuro checks q 2 hrs. No new deficits noted. On RA. SBP goal 150-180, actively titrating brandon to keep within parameter. Nausea x1 at start of shift. Zofran given and nausea resolved. Voiding without difficulty. Norco given x 1 with relief per pt. SCD's on. No family present overnight

## 2024-09-18 NOTE — PLAN OF CARE
A/Ox4, R facial droop and mild R extremity weakness, improved since yesterday. Slow speech, clear. L head incision WDL. Midodrine started, wean Evelio as able to keep -180. Family at bedside throughout shift.       Problem: NEUROLOGICAL - ADULT  Goal: Achieves stable or improved neurological status  Description: INTERVENTIONS  - Assess for and report changes in neurological status  - Initiate measures to prevent increased intracranial pressure  - Maintain blood pressure and fluid volume within ordered parameters to optimize cerebral perfusion and minimize risk of hemorrhage  - Monitor temperature, glucose, and sodium. Initiate appropriate interventions as ordered  Outcome: Progressing  Goal: Achieves maximal functionality and self care  Description: INTERVENTIONS  - Monitor swallowing and airway patency with patient fatigue and changes in neurological status  - Encourage and assist patient to increase activity and self care with guidance from PT/OT  - Encourage visually impaired, hearing impaired and aphasic patients to use assistive/communication devices  Outcome: Progressing  Goal: Absence of seizures  Description: INTERVENTIONS  - Monitor for seizure activity  - Administer anti-seizure medications as ordered  - Monitor neurological status  Outcome: Progressing  Goal: Remains free of injury related to seizure activity  Description: INTERVENTIONS:  - Maintain airway, patient safety  and administer oxygen as ordered  - Monitor patient for seizure activity, document and report duration and description of seizure to MD/LIP  - If seizure occurs, turn patient to side and suction secretions as needed  - Reorient patient post seizure  - Seizure pads on all 4 side rails  - Instruct patient/family to notify RN of any seizure activity  - Instruct patient/family to call for assistance with activity based on assessment  Outcome: Progressing     Problem: PAIN - ADULT  Goal: Verbalizes/displays adequate comfort level or  patient's stated pain goal  Description: INTERVENTIONS:  - Encourage pt to monitor pain and request assistance  - Assess pain using appropriate pain scale  - Administer analgesics based on type and severity of pain and evaluate response  - Implement non-pharmacological measures as appropriate and evaluate response  - Consider cultural and social influences on pain and pain management  - Manage/alleviate anxiety  - Utilize distraction and/or relaxation techniques  - Monitor for opioid side effects  - Notify MD/LIP if interventions unsuccessful or patient reports new pain  - Anticipate increased pain with activity and pre-medicate as appropriate  Outcome: Progressing

## 2024-09-18 NOTE — CM/SW NOTE
Patient started on midodrine with efforts to wean brandon gtt.  When blood pressure optimized--therapies to work and re evaluate needs

## 2024-09-18 NOTE — PROGRESS NOTES
[facility]   NEUROCRITICAL CARE   PROGRESS NOTE    Admission date: 9/12/2024  Reason for Consult: Post op EDAS  Chief Complaint: Recent stroke  ________________________________________________________________    SUBJECTIVE     24 Hour Significant Events: Exam stable overnight, on brandon at 55.  Euvolemic.  Labs stable.  Starting midodrine this morning to wean off brandon. Updated family at bedside.     OBJECTIVE   VITAL SIGNS:   Temp:  [97.5 °F (36.4 °C)-98.2 °F (36.8 °C)] 97.9 °F (36.6 °C)  Pulse:  [] 72  Resp:  [8-20] 9  BP: (121-193)/(63-96) 159/90  SpO2:  [93 %-100 %] 95 %  AO: (150-171)/(73-86) 150/86    INTAKE/OUTPUT:  Intake/Output Summary (Last 24 hours) at 9/18/2024 0924  Last data filed at 9/18/2024 0800  Gross per 24 hour   Intake 1718 ml   Output 2100 ml   Net -382 ml     PHYSICAL EXAM:  GENERAL APPEARANCE: Patient resting comfortably in bed, NAD  HEENT: Normocephalic, atraumatic.   LUNGS: Normal respiratory rate and effort   HEART: Regular rate and rhythm   ABDOMEN: Soft. No tenderness, guarding, or rebound.     NEUROLOGIC:    Mental Status:  A&O x 4, Follows simple commands, slow mildly dysarthric speech, word output improving, able to name and repeat  Cranial nerves: PERRL.  Visual fields full.  EOMI.  R droop improving, more movement in face now.  Hearing grossly intact. Tongue midline   Motor: Drift:  RUE pronation; Motor exam is 5 out of 5 in all extremities bilaterally except 5-/5 in RUE   Sensation: Intact to light touch bilaterally  Cerebellar: Normal Finger-To-Nose test    LABORATORY DATA:  Last 24 hour labs were reviewed in detail.  Recent Labs   Lab 09/16/24  0431 09/17/24  0412 09/17/24  1857 09/18/24  0438    140  --  139   K 3.7 3.6 3.9 4.1    105  --  103   CO2 26.0 28.2  --  28.0   * 99  --  95   BUN 7* 7*  --  7*   CREATSERUM 0.52* 0.51*  --  0.66     Recent Labs   Lab 09/16/24  0431 09/17/24  0412 09/18/24  0438   WBC 7.0 6.2 5.9   HGB 10.5* 10.7*  11.7*   .0 258.0 340.0     Recent Labs   Lab 09/13/24  0508 09/13/24  1617   ALT 17 15   AST 13 15     Recent Labs   Lab 09/14/24  0418 09/15/24  0406 09/16/24  0431   MG 1.7 2.1 1.9       Scheduled Meds:   midodrine  10 mg Oral TID    sennosides  17.2 mg Oral BID    polyethylene glycol (PEG 3350)  17 g Oral Daily    levETIRAcetam  500 mg Oral BID    atorvastatin  40 mg Oral Nightly    heparin  5,000 Units Subcutaneous 2 times per day    aspirin  325 mg Oral Daily    [Transfer Hold] vortioxetine  10 mg Oral Nightly    [Transfer Hold] progesterone  100 mg Oral Nightly     Continuous Infusions:   phenylephrine 55 mcg/min (09/18/24 0800)     PRN Meds:  magnesium hydroxide    ondansetron    HYDROcodone-acetaminophen **OR** HYDROcodone-acetaminophen    morphINE    [Transfer Hold] acetaminophen    [Transfer Hold] bisacodyl    [Transfer Hold] fleet enema    Radiology:    No results found.  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 (9/3-9/5)  S/P craniotomy for left encephaloduroarteriosynangiosis         - Post op CT w/ expected post op changes          - C/b post op worsening of aphasia and droop, repeat CT stable          - EEG 9/14 with no focal, lateralized or generalized epileptiform discharges or seizures noted          - S/p Keppra 500 mg BID x7 days          - Continue asa and statin          - SBP goal back to 150-180, start midodrine to wean off evelio         - PT/OT/SLP evals     Cardiovascular:         - SBP goal as noted above          - Hold home medications         - Evelio gtt PRN      Pulmonary: - Saturating well on RA, encourage IS      Renal:         - Monitor I&Os           -Daily BMP      Gastrointestinal:  Nutrition:  - ADAT    Atonic constipation  - No bowel movement since admission, bowel regimen scheduled 9/17, had BM later in evening      Infectious Disease: - No leukocytosis, afebrile, continue to monitor      Heme/Onc   Anemia - Hb goal > 7, daily CBC     Endocrine: - Accuchecks q6 with SSI prn            Checklist         - Lines: PIVs         - DVT Prophylaxis: SCDs, HSQ         - Diet: ADAT         - IVF: -          - Electrolytes: Replete per protocol         - Code Status: FULL     Dispo: CNICU      Greater than 50 minutes of critical care time (exclusive of billable procedures) was administered to manage and/or prevent neurologic instability. This involved direct patient intervention, complex decision making, and/or extensive discussions with the patient, family, and clinical staff.    Dustin Dillard MD  Neurocritical Care  [facility]

## 2024-09-18 NOTE — OCCUPATIONAL THERAPY NOTE
OT treatment attempted - 162/85 in supine, 128/83 sitting EOB, then 120/77 post exercises sitting EOB. BP remained outside of parameters. No dizziness. Returned to supine, 172/74. Informed RN.

## 2024-09-18 NOTE — PROGRESS NOTES
[facility]  Neurosurgery Progress Note    Isabella Kevin Patient Status:  Inpatient    1970 MRN [id number]   Location [facility] 6NE-A Attending Cornelio Guzman MD   Hosp Day # 6 PCP Mildred Steward MD     Chief Complaint:  POD #5 L EDAS for moyamoya    Subjective:  Evelio gtt on to maintain -180. Patient's HOB slowly elevated overnight which she tolerated well. Reports mild HA. Has been nauseous with decreased appetite, improved with Zofran.     Objective/Physical Exam:    Vital Signs:  Blood pressure (!) 172/84, pulse 56, temperature 97.9 °F (36.6 °C), temperature source Temporal, resp. rate 11, weight 133 lb 2.5 oz (60.4 kg), SpO2 96%, not currently breastfeeding.  Neurologic:   Delayed speech with quiet voice. Oriented x 3.   Follows commands briskly  PERRL, EOMi  Right facial droop  R  4+/5, R triceps 4+/5 ow full strength all other muscle groups of RUE   5/5 strength LUE and BLE     Incision clean dry and intact      Labs:  Recent Labs   Lab 24  0508 24  1617 24  0418 24  0431 24  0412 24  0438   RBC 3.62* 3.61*   < > 3.43* 3.46* 3.89   HGB 11.1* 11.1*   < > 10.5* 10.7* 11.7*   HCT 31.7* 31.4*   < > 30.2* 29.3* 33.4*   MCV 87.6 87.0   < > 88.0 84.7 85.9   MCH 30.7 30.7   < > 30.6 30.9 30.1   MCHC 35.0 35.4   < > 34.8 36.5 35.0   RDW 11.9 12.0   < > 12.2 11.9 12.0   NEPRELIM 2.12 7.91*  --   --  3.32  --    WBC 4.4 8.8   < > 7.0 6.2 5.9   .0 212.0   < > 235.0 258.0 340.0    < > = values in this interval not displayed.       Recent Labs   Lab 24  0431 24  0412 24  1857 24  0438   * 99  --  95   BUN 7* 7*  --  7*   CREATSERUM 0.52* 0.51*  --  0.66   EGFRCR 111 112  --  105   CA 9.2 9.7  --  10.0    140  --  139   K 3.7 3.6 3.9 4.1    105  --  103   CO2 26.0 28.2  --  28.0     Assessment:  54-year-old  s/p left-sided EDAS for symptomatic moyamoya disease    Plan:  -Optimization per  NCC  -Q2' neurochecks  -Pain control  -Nutrition consult in setting of decreased appetite and nausea for possible nutritional supplements  -Continue to wash hair and incision site and keep incision open to air, clean and dry   -SCDs and SQH for DVT ppx  -Continue SBP goal 150-180 and plan to add Midodrine and wean off IV phenylephrine.  -Continue daily ASA 325mg  -ADAT  -PT/OT/OOB  -Maintain euvolemia  -Continue Keppra  -Dispo planning     Patient seen with Dr. Thomas Rosenberg PA-C  [facility]  9/18/2024 12:48 PM     Is this a shared or split note between Advanced Practice Provider and Physician? Yes

## 2024-09-19 NOTE — PLAN OF CARE
Received bedside report this Am, Neuro assessment unchanged. See flowsheet.  at bedside. Plan to Wean off Evelio as able maintaining -180. Strict BR, Strict I&O documentation. Discussed with family and patient. Questions answered. And support provided.     Problem: Patient/Family Goals  Goal: Patient/Family Long Term Goal  Description: Patient's Long Term Goal: Stay out of hospital    Interventions:  - Follow up with PCP after discharge  - Take medication as prescribed  - See additional Care Plan goals for specific interventions  9/19/2024 1037 by Berna Wong, RN  Outcome: Progressing  Note: Discharge home  9/19/2024 1037 by Berna Wong, RN  Outcome: Progressing  Goal: Patient/Family Short Term Goal  Description: Patient's Short Term Goal: Feel better and go home    Interventions:   - Test/procedure ordered  - Monitor labs  - See additional Care Plan goals for specific interventions  9/19/2024 1037 by Berna Wong, RN  Outcome: Progressing  Note: Improve neurological status and maintain bp goals off iv medication  9/19/2024 1037 by Berna Wong, RN  Outcome: Progressing     Problem: NEUROLOGICAL - ADULT  Goal: Achieves stable or improved neurological status  Description: INTERVENTIONS  - Assess for and report changes in neurological status  - Initiate measures to prevent increased intracranial pressure  - Maintain blood pressure and fluid volume within ordered parameters to optimize cerebral perfusion and minimize risk of hemorrhage  - Monitor temperature, glucose, and sodium. Initiate appropriate interventions as ordered  9/19/2024 1037 by Berna Wong, RN  Outcome: Progressing  9/19/2024 1037 by Berna Wong, RN  Outcome: Progressing  Goal: Achieves maximal functionality and self care  Description: INTERVENTIONS  - Monitor swallowing and airway patency with patient fatigue and changes in neurological status  - Encourage and assist patient to increase activity and self care with  guidance from PT/OT  - Encourage visually impaired, hearing impaired and aphasic patients to use assistive/communication devices  9/19/2024 1037 by Berna Wong RN  Outcome: Progressing  9/19/2024 1037 by Berna Wong RN  Outcome: Progressing  Goal: Absence of seizures  Description: INTERVENTIONS  - Monitor for seizure activity  - Administer anti-seizure medications as ordered  - Monitor neurological status  9/19/2024 1037 by Berna Wong RN  Outcome: Progressing  9/19/2024 1037 by Berna Wong RN  Outcome: Progressing  Goal: Remains free of injury related to seizure activity  Description: INTERVENTIONS:  - Maintain airway, patient safety  and administer oxygen as ordered  - Monitor patient for seizure activity, document and report duration and description of seizure to MD/LIP  - If seizure occurs, turn patient to side and suction secretions as needed  - Reorient patient post seizure  - Seizure pads on all 4 side rails  - Instruct patient/family to notify RN of any seizure activity  - Instruct patient/family to call for assistance with activity based on assessment  9/19/2024 1037 by Berna Wong RN  Outcome: Progressing  9/19/2024 1037 by Berna Wong RN  Outcome: Progressing     Problem: PAIN - ADULT  Goal: Verbalizes/displays adequate comfort level or patient's stated pain goal  Description: INTERVENTIONS:  - Encourage pt to monitor pain and request assistance  - Assess pain using appropriate pain scale  - Administer analgesics based on type and severity of pain and evaluate response  - Implement non-pharmacological measures as appropriate and evaluate response  - Consider cultural and social influences on pain and pain management  - Manage/alleviate anxiety  - Utilize distraction and/or relaxation techniques  - Monitor for opioid side effects  - Notify MD/LIP if interventions unsuccessful or patient reports new pain  - Anticipate increased pain with activity and pre-medicate as  appropriate  9/19/2024 1037 by Berna Wong, RN  Outcome: Progressing  9/19/2024 1037 by Berna Wong, RN  Outcome: Progressing

## 2024-09-19 NOTE — PROGRESS NOTES
[facility]   NEUROCRITICAL CARE   PROGRESS NOTE    Admission date: 9/12/2024  Reason for Consult: Post op EDAS  Chief Complaint: Recent stroke  ________________________________________________________________    SUBJECTIVE     24 Hour Significant Events: Exam stable overnight, start to wean down off of brandon today. Tired this AM. No new complaints.     OBJECTIVE   VITAL SIGNS:   Temp:  [97 °F (36.1 °C)-98.9 °F (37.2 °C)] 98.1 °F (36.7 °C)  Pulse:  [52-74] 55  Resp:  [8-14] 9  BP: (120-189)/() 159/75  SpO2:  [94 %-100 %] 97 %    INTAKE/OUTPUT:  Intake/Output Summary (Last 24 hours) at 9/19/2024 0906  Last data filed at 9/19/2024 0800  Gross per 24 hour   Intake 640.5 ml   Output 1400 ml   Net -759.5 ml     PHYSICAL EXAM:  GENERAL APPEARANCE: Patient resting comfortably in bed, NAD  HEENT: Normocephalic, atraumatic.   LUNGS: Normal respiratory rate and effort   HEART: Regular rate and rhythm   ABDOMEN: Soft. No tenderness, guarding, or rebound.     NEUROLOGIC:    Mental Status:  A&O x 4, Follows simple commands, slow mildly dysarthric speech, word output improving, able to name and repeat  Cranial nerves: PERRL.  Visual fields full.  EOMI.  R droop improving, more movement in face now.  Hearing grossly intact. Tongue midline   Motor: Drift:  RUE pronation; Motor exam is 5 out of 5 in all extremities bilaterally except 5-/5 in RUE   Sensation: Intact to light touch bilaterally  Cerebellar: Normal Finger-To-Nose test    LABORATORY DATA:  Last 24 hour labs were reviewed in detail.  Recent Labs   Lab 09/17/24  0412 09/17/24  1857 09/18/24  0438 09/19/24  0528     --  139 138   K 3.6 3.9 4.1 3.9     --  103 100   CO2 28.2  --  28.0 28.0   GLU 99  --  95 94   BUN 7*  --  7* 7*   CREATSERUM 0.51*  --  0.66 0.65     Recent Labs   Lab 09/17/24  0412 09/18/24  0438 09/19/24  0528   WBC 6.2 5.9 4.3   HGB 10.7* 11.7* 11.6*   .0 340.0 271.0     Recent Labs   Lab 09/13/24  0507  09/13/24  1617   ALT 17 15   AST 13 15     Recent Labs   Lab 09/14/24  0418 09/15/24  0406 09/16/24  0431   MG 1.7 2.1 1.9       Scheduled Meds:   multivitamin with minerals  1 tablet Oral Daily    midodrine  10 mg Oral TID    sennosides  17.2 mg Oral BID    polyethylene glycol (PEG 3350)  17 g Oral Daily    levETIRAcetam  500 mg Oral BID    atorvastatin  40 mg Oral Nightly    heparin  5,000 Units Subcutaneous 2 times per day    aspirin  325 mg Oral Daily    [Transfer Hold] vortioxetine  10 mg Oral Nightly    [Transfer Hold] progesterone  100 mg Oral Nightly     Continuous Infusions:   phenylephrine 30 mcg/min (09/19/24 0800)     PRN Meds:  acetaminophen    magnesium hydroxide    ondansetron    HYDROcodone-acetaminophen **OR** HYDROcodone-acetaminophen    morphINE    [Transfer Hold] bisacodyl    [Transfer Hold] fleet enema    Radiology:    No results found.  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 (9/3-9/5)  S/P craniotomy for left encephaloduroarteriosynangiosis         - Post op CT w/ expected post op changes          - C/b post op worsening of aphasia and droop, repeat CT stable          - EEG 9/14 with no focal, lateralized or generalized epileptiform discharges or seizures noted          - S/p Keppra 500 mg BID x7 days          - Continue asa and statin          - Will start to lower blood pressure parameters today while on midodrine if okay with neurosurgery   - Down by 10 q3 hours until off evelio. Remain in bed today, ok to sit up          - PT/OT evals in AM     Cardiovascular:         - SBP goal as noted above          - Hold home medications         - Evelio gtt PRN weaning off      Pulmonary: - Saturating well on RA,  encourage IS      Renal:         - Monitor I&Os           -Daily BMP      Gastrointestinal:  Nutrition: - ADAT    Atonic constipation  - Bowel regimen scheduled 9/17, had BM later in evening, continue current regimen      Infectious Disease: - No leukocytosis, afebrile, continue to monitor      Heme/Onc   Anemia - Hb goal > 7, daily CBC     Endocrine: - Accuchecks q6 with SSI prn            Checklist         - Lines: PIVs         - DVT Prophylaxis: SCDs, HSQ         - Diet: ADAT         - IVF: -          - Electrolytes: Replete per protocol         - Code Status: FULL     Dispo: CNICU      Greater than 40 minutes of critical care time (exclusive of billable procedures) was administered to manage and/or prevent neurologic instability. This involved direct patient intervention, complex decision making, and/or extensive discussions with the patient, family, and clinical staff.    Dustin Dillard MD  Neurocritical Care  [facility]

## 2024-09-19 NOTE — PROGRESS NOTES
[facility] Hospitalist Progress Note     CC: Hospital Follow up    PCP: Mildred Steward MD       Assessment/Plan:     Active Problems:    Cerebrovascular accident (HCC)    Moyamoya    Moyamoya disease    Aphasia    Facial droop    Hypokalemia    Anemia    Atonic constipation      Patient is a 54 year old female with PMH sig for ADHD who presented for elective craniotomy for moyamoya disease.  She is status post EDAS.    Recent CVA  Moyamoya disease  Status post EDAS, POD 6  - Neurochecks  - Pain management per neurosurgery  - PT/OT/speech evaluation-stable  - BP parameters systolic 120-180, initially weaned off of brandon, however, BP dropped and patient was symptomatic, brandon reinitiated, plan to wean off with midodrine  -Keppra 500 mgTwice dailyx 7 days postsurgery  -Neurosurgery and neurointensivist following    R sided facial droop-improved  R sided weakness- improved  - frequent neurochecks,   - adequate BP control  - Speech eval-diet advanced    Hypotension- resolved  Bradycardia- resolved  Right-sided symptoms- resolved  - Likely in the setting of hypotension and bradycardia  - Symptoms have resolved as of 9/17  - Continue to maintain BP within range per neuro  -Every 2h neurochecks per neurosurgery    Bradycardia  - noted on tele, patient asymptomatic  - pt has \"erratic HR\" per family, likely high in the setting of adderall  - obtain EKG-reviewed, no blocks noted  - telemetry    ADHD  - Recommend resuming home Adderall, might assist with improved blood pressure and heart rate      Prophylaxis:   DVT with scd, heparin subcu  Atrophy Prophylaxis PT/OT  Lines/Monitors:     Dispo: CCU  Code status: Full    Patient and/or patient's family given opportunity to ask questions and note understanding and agreeing with therapeutic plan as outlined    Thank you for allowing me to participate in the care of this patient.     Thank You,  Stefani Oden DO    [facility] Hospitalist  Pager   Answering Service number: [phone]        Subjective:     Seen and examined at bedside.  Still on brandon-.  Receiving midodrine however difficult time weaning off brandon at this time.  No new symptoms.   at bedside.      OBJECTIVE:    Blood pressure (!) 162/75, pulse 61, temperature 98.2 °F (36.8 °C), temperature source Temporal, resp. rate 11, weight 126 lb 5.2 oz (57.3 kg), SpO2 95%, not currently breastfeeding.    Temp:  [97 °F (36.1 °C)-98.9 °F (37.2 °C)] 98.2 °F (36.8 °C)  Pulse:  [52-76] 61  Resp:  [7-16] 11  BP: (127-194)/() 162/75  SpO2:  [92 %-98 %] 95 %      Intake/Output:    Intake/Output Summary (Last 24 hours) at 9/19/2024 1438  Last data filed at 9/19/2024 1400  Gross per 24 hour   Intake 1181.43 ml   Output 875 ml   Net 306.43 ml       Last 3 Weights   09/18/24 0800 126 lb 5.2 oz (57.3 kg)   09/15/24 0400 133 lb 2.5 oz (60.4 kg)   09/14/24 0444 132 lb 11.5 oz (60.2 kg)   09/12/24 1944 134 lb 14.7 oz (61.2 kg)   09/12/24 1926 134 lb 14.7 oz (61.2 kg)   09/04/24 0600 134 lb 3.2 oz (60.9 kg)   08/30/24 0928 132 lb (59.9 kg)   08/28/24 0213 133 lb 9.6 oz (60.6 kg)   08/28/24 0150 133 lb 8 oz (60.6 kg)   08/27/24 1840 132 lb (59.9 kg)       Exam   Gen: No acute distress, alert and oriented x3, no focal neurologic deficits, tired  Heent: Surgical staples intact, clean and dry, drain has been removed   pulm: Lungs clear, normal respiratory effort  CV: Heart with regular rate and rhythm, no peripheral edema  Abd: Abdomen soft, nontender, nondistended, bowel sounds present  MSK: Full range of motion in extremities   Skin: no rashes or lesions  Neuro: AO*3, right-sided facial droop, otherwise, neuro exam is grossly intact, delayed speech   Psyc: Depressed mood      Data Review:       Labs:     Recent Labs   Lab 09/13/24  1617 09/14/24  0418 09/17/24  0412 09/18/24  0438 09/19/24  0528   RBC 3.61*   < > 3.46* 3.89 3.83   HGB 11.1*   < > 10.7* 11.7* 11.6*   HCT 31.4*   < > 29.3* 33.4* 33.2*   MCV 87.0   < > 84.7 85.9 86.7   MCH 30.7   < > 30.9  30.1 30.3   MCHC 35.4   < > 36.5 35.0 34.9   RDW 12.0   < > 11.9 12.0 12.2   NEPRELIM 7.91*  --  3.32  --  2.79   WBC 8.8   < > 6.2 5.9 4.3   .0   < > 258.0 340.0 271.0    < > = values in this interval not displayed.         Recent Labs   Lab 09/17/24  0412 09/17/24  1857 09/18/24  0438 09/19/24  0528   GLU 99  --  95 94   BUN 7*  --  7* 7*   CREATSERUM 0.51*  --  0.66 0.65   EGFRCR 112  --  105 105   CA 9.7  --  10.0 9.6     --  139 138   K 3.6 3.9 4.1 3.9     --  103 100   CO2 28.2  --  28.0 28.0       Recent Labs   Lab 09/13/24  0508 09/13/24  1617   ALT 17 15   AST 13 15   ALB 4.1 3.9         Imaging:  No results found.      Meds:      [START ON 9/20/2024] sennosides  8.6 mg Oral Daily    multivitamin with minerals  1 tablet Oral Daily    midodrine  10 mg Oral TID    levETIRAcetam  500 mg Oral BID    atorvastatin  40 mg Oral Nightly    heparin  5,000 Units Subcutaneous 2 times per day    aspirin  325 mg Oral Daily    vortioxetine  10 mg Oral Nightly    [Transfer Hold] progesterone  100 mg Oral Nightly      phenylephrine 20 mcg/min (09/19/24 1413)       acetaminophen    magnesium hydroxide    ondansetron    HYDROcodone-acetaminophen **OR** HYDROcodone-acetaminophen    morphINE    [Transfer Hold] bisacodyl    [Transfer Hold] fleet enema

## 2024-09-19 NOTE — PROGRESS NOTES
[facility]  Neurosurgery Progress Note    Isabella Kevin Patient Status:  Inpatient    1970 MRN [id number]   Location [facility] 6NE-A Attending Cornelio Guzman MD   Hosp Day # 7 PCP Mildred Steward MD     Chief Complaint:  POD #6 L EDAS for moyamoya    Subjective:  Midodrine started TID to assist to help wean of pressors.      Objective/Physical Exam:    Vital Signs:  Blood pressure 133/69, pulse 57, temperature 97.9 °F (36.6 °C), temperature source Temporal, resp. rate 12, weight 126 lb 5.2 oz (57.3 kg), SpO2 95%, not currently breastfeeding.  Neurologic:   Delayed speech with quiet voice. Oriented x 3.   Follows commands briskly  PERRL, EOMi  Right facial droop  R  4+/5, R triceps 4+/5 ow full strength all other muscle groups of RUE   5/5 strength LUE and BLE     Incision clean dry and intact      Labs:  Recent Labs   Lab 24  1617 24  0528   RBC 3.61*   < > 3.46* 3.89 3.83   HGB 11.1*   < > 10.7* 11.7* 11.6*   HCT 31.4*   < > 29.3* 33.4* 33.2*   MCV 87.0   < > 84.7 85.9 86.7   MCH 30.7   < > 30.9 30.1 30.3   MCHC 35.4   < > 36.5 35.0 34.9   RDW 12.0   < > 11.9 12.0 12.2   NEPRELIM 7.91*  --  3.32  --  2.79   WBC 8.8   < > 6.2 5.9 4.3   .0   < > 258.0 340.0 271.0    < > = values in this interval not displayed.       Recent Labs   Lab 24  18524  04324  0528   GLU 99  --  95 94   BUN 7*  --  7* 7*   CREATSERUM 0.51*  --  0.66 0.65   EGFRCR 112  --  105 105   CA 9.7  --  10.0 9.6     --  139 138   K 3.6 3.9 4.1 3.9     --  103 100   CO2 28.2  --  28.0 28.0     Assessment:  54-year-old  s/p left-sided EDAS for symptomatic moyamoya disease    Plan:  -Optimization per NCC  -Q2' neurochecks  -Pain control  -Continue to wash hair and incision site and keep incision open to air, clean and dry   -SCDs and SQH for DVT ppx  -Will liberalize BP parameters per NCC to  titrate off pressors  -Continue daily ASA 325mg  -ADAT  -PT/OT/OOB  -Maintain euvolemia  -Continue Keppra x7days  -Dispo planning     Patient seen with Dr. Thomas Anand, [facility]      Is this a shared or split note between Advanced Practice Provider and Physician? Yes

## 2024-09-19 NOTE — SLP NOTE
SLP continues to follow. Plan for reassessment of communication once patient BP stabilizes. Discussed with RN. Note patient already has OP SLP scheduled for previous communication deficits.    Antonio Miles MS CCC-SLP  Pager [phone]

## 2024-09-19 NOTE — DISCHARGE INSTRUCTIONS
Patient Instructions:  Activity: Take it easy for next several weeks. Recommend no returning to work until cleared at follow up appointments. No driving while on analgesics.  Monitor for stroke like symptoms. If concerns, recommend laying flat in the bed and calling 911.  Wound Care: Keep incision clean and dry. No ointments or creams to incision site. Use baby shampoo only, no conditioners or hair products until cleared by neurosurgery.   Continue to take daily Aspirin 81mg and Plavix 75mg daily

## 2024-09-19 NOTE — PHYSICAL THERAPY NOTE
PT treatment att'd , discussed case with RN- now with strict bed rest order. Will hold and re-attempt as appropriate.

## 2024-09-20 NOTE — OCCUPATIONAL THERAPY NOTE
OCCUPATIONAL THERAPY TREATMENT NOTE - INPATIENT     Room Number: 6620/6620-A  Session: 2  Number of Visits to Meet Established Goals: 8    Presenting Problem: Mcclain mcclain with recent L CVA, s/p L crani and EDAS bypass 9/13      ASSESSMENT   Patient demonstrates fair progress this session, goals remain in progress. Per MD note on 9/20, \"ok to work with PT this afternoon and can sit up in bedside chair.\"    BP monitored during the session:  Supine 126/68 HR 69  Sitting 123/83 HR 80  Sitting on commode after standing and taking steps 126/81 HR 81  Sitting in chair 127/76 HR 70    Patient continues to function near baseline with toileting and lower body dressing.   Contributing factors to remaining limitations include decreased endurance.  Next session anticipate patient to progress toileting and lower body dressing.  Occupational Therapy will continue to follow patient for duration of hospitalization.    Patient continues to benefit from continued skilled OT services: for duration of hospitalization, however, given the patient is functioning near baseline level do not anticipate skilled therapy needs at discharge .        OT Device Recommendations: None    History: Patient is a 54 year old female admitted on 9/12/2024 with Presenting Problem: Mcclain mcclain with recent L CVA, s/p L crani and EDAS bypass 9/13. Co-Morbidities : L hemispheric stroke 9/3/2024, ADHD, vit D deficiency, mcclain mcclain     WEIGHT BEARING RESTRICTION  Weight Bearing Restriction: None                Recommendations for nursing staff:   Transfers: sba  Toileting location: bedside commode, per MD order 9/20    TREATMENT SESSION:  Patient Start of Session: supine  FUNCTIONAL TRANSFER ASSESSMENT  Sit to Stand: Edge of Bed  Edge of Bed: Stand-by Assist  Toilet Transfer: Not Tested  Commode Transfer: Stand-by Assist    BED MOBILITY  Supine to Sit : Supervision  Sit to Supine (OT): Not Tested    BALANCE ASSESSMENT     FUNCTIONAL ADL ASSESSMENT  LB Dressing Seated:  Not Tested  Toileting Standing: Supervision      EDUCATION PROVIDED  Patient: Role of Occupational Therapy; Plan of Care; Functional Transfer Techniques; Posture/Positioning  Patient's Response to Education: Verbalized Understanding      Equipment used: rw, commode    Therapist comments: pleasant, motivated.  present. Per MD order, ok to work with therapy, ok to be up to the chair. -180 per RN.   See the top section of this note about BP reading during the session. SBA to stand and to transfer to bedside commode. Independent hygiene care while standing. Up to chair. Updated RN.        Patient End of Session: Up in chair;Needs met;Call light within reach;RN aware of session/findings;All patient questions and concerns addressed;Family present;SCDs in place      PAIN ASSESSMENT  Ratin  Location: RUE with BP reading, incision  Management Techniques: Repositioning;Relaxation;Nurse notified     OBJECTIVE  Precautions:  (-180)    AM-PAC ‘6-Clicks’ Inpatient Daily Activity Short Form  -   Putting on and taking off regular lower body clothing?: A Little  -   Bathing (including washing, rinsing, drying)?: A Little  -   Toileting, which includes using toilet, bedpan or urinal? : A Little  -   Putting on and taking off regular upper body clothing?: A Little  -   Taking care of personal grooming such as brushing teeth?: None  -   Eating meals?: None    AM-PAC Score:  Score: 20  Approx Degree of Impairment: 38.32%  Standardized Score (AM-PAC Scale): 42.03    PLAN  OT Treatment Plan: Balance activities;ADL training;Functional transfer training;UE strengthening/ROM;Patient/Family education;Patient/Family training;Equipment eval/education;Neuromuscluar reeducation;Compensatory technique education;Fine motor coordination activities  Rehab Potential : Good  Frequency: 3-5x/week    OT Goals:   Ongoing   ADL Goals   Patient will perform grooming: with supervision-MET   Patient will perform lower body  dressing:  with supervision -ONGOING  Patient will perform toileting: with supervision-MET 9/16     Functional Transfer Goals  Patient will transfer from sit to supine:  with supervision  Patient will transfer from supine to sit:  with supervision-MET 9/16  Patient will transfer from sit to stand:  with supervision-MET 9/16  Patient will transfer to toilet:  with supervision-MET 9/16     UE Exercise Program Goal  Patient will be supervision with right AROM HEP (home exercise program).  Patient will be supervision with right coordination HEP (home exercise program)     Additional Goals  Pt will stand 5 minutes during light ADL     Therapist Goals  Complete PhQ9 vs ADRS        OT Session Time: 14 minutes  Self-Care Home Management: 8 minutes

## 2024-09-20 NOTE — CM/SW NOTE
Patient able to work with therapies today--NOT anticipating need @ discharge--CM/SW to continue to follow

## 2024-09-20 NOTE — SPIRITUAL CARE NOTE
Spiritual Care Visit Note    Patient Name: Isabella Kevin Date of Spiritual Care Visit: 24   : 1970 Primary Dx: <principal problem not specified>       Referred By:      Spiritual Care Taxonomy:    Intended Effects: Build relationship of care and support    Methods: Offer support;Offer spiritual/Mandaeism support;Offer emotional support    Interventions: Explain  role;Silent prayer    Visit Type/Summary:     - Spiritual Care: Patient and family expressed appreciation for  visit. Provided information regarding how to contact Spiritual Care and left a Spiritual Care information card.  remains available as needed for follow up.    Spiritual Care support can be requested via an Epic consult. For urgent/immediate needs, please contact the On Call  at: [facility]: ext [phone]    Effie Mitchell MA

## 2024-09-20 NOTE — PAYOR COMM NOTE
--------------  CONTINUED STAY REVIEW    Payor: SHELDON PPBECKY  Subscriber #:  [id number]  Authorization Number: [id number]    Admit date: 9/12/24  Admit time:  6:09 PM    REVIEW DOCUMENTATION:      9/18 IM        Assessment/Plan:      Active Problems:    Cerebrovascular accident (HCC)    Moyamoya    Moyamoya disease    Aphasia    Facial droop    Hypokalemia    Anemia    Atonic constipation        Patient is a 54 year old female with PMH sig for ADHD who presented for elective craniotomy for moyamoya disease.  She is status post EDAS.     Recent CVA  Moyamoya disease  Status post EDAS, POD 5  - Neurochecks  - Pain management per neurosurgery  - PT/OT/speech evaluation-stable  - Brown to be maintained  - BP parameters systolic 120-180, initially weaned off of brandon, however, BP dropped and patient was symptomatic, brandon reinitiated, plan to wean off with midodrine  -Keppra 500 mgTwice dailyx 7 days postsurgery  -Neurosurgery and neurointensivist following     R sided facial droop-improved  R sided weakness- improved  - frequent neurochecks,   - adequate BP control  - Speech eval-diet advanced     Hypotension  Bradycardia  Right-sided symptoms  - Likely in the setting of hypotension and bradycardia  - Symptoms have resolved as of 9/17  - Continue to maintain brandon-  -Every 2h neurochecks per neurosurgery     Bradycardia  - noted on tele, patient asymptomatic  - pt has \"erratic HR\" per family, likely high in the setting of adderall  - obtain EKG-reviewed, no blocks noted  - telemetry     ADHD  - Currently hold home meds        Prophylaxis:   DVT with scd, heparin subcu  Atrophy Prophylaxis PT/OT  Lines/Monitors:      Dispo: CCU  Code status: Full     Patient and/or patient's family given opportunity to ask questions and note understanding and agreeing with therapeutic plan as outlined     Thank you for allowing me to participate in the care of this patient.      Thank You,  Stefani Oden DO     [facility]  Pager    Answering Service number: [phone]         Subjective:      Seen and examined at bedside.  Remains on brandon-.  No new symptoms.   at bedside.  Feels very tired.     OBJECTIVE:     Blood pressure (!) 162/132, pulse 61, temperature 97.5 °F (36.4 °C), temperature source Temporal, resp. rate 12, weight 126 lb 5.2 oz (57.3 kg), SpO2 96%, not currently breastfeeding.     Temp:  [97.5 °F (36.4 °C)-98.2 °F (36.8 °C)] 97.5 °F (36.4 °C)  Pulse:  [] 61  Resp:  [8-20] 12  BP: (120-193)/() 162/132  SpO2:  [93 %-100 %] 96 %  AO: (150-162)/(77-86) 150/86        Intake/Output:     Intake/Output Summary (Last 24 hours) at 9/18/2024 1335  Last data filed at 9/18/2024 0800      Gross per 24 hour   Intake 1598 ml   Output 1300 ml   Net 298 ml              Last 3 Weights   09/18/24 0800 126 lb 5.2 oz (57.3 kg)   09/15/24 0400 133 lb 2.5 oz (60.4 kg)   09/14/24 0444 132 lb 11.5 oz (60.2 kg)   09/12/24 1944 134 lb 14.7 oz (61.2 kg)   09/12/24 1926 134 lb 14.7 oz (61.2 kg)   09/04/24 0600 134 lb 3.2 oz (60.9 kg)   08/30/24 0928 132 lb (59.9 kg)   08/28/24 0213 133 lb 9.6 oz (60.6 kg)   08/28/24 0150 133 lb 8 oz (60.6 kg)   08/27/24 1840 132 lb (59.9 kg)         Exam   Gen: No acute distress, alert and oriented x3, no focal neurologic deficits, tired  Heent: Surgical staples intact, clean and dry, drain has been removed   pulm: Lungs clear, normal respiratory effort  CV: Heart with regular rate and rhythm, no peripheral edema  Abd: Abdomen soft, nontender, nondistended, bowel sounds present  MSK: Full range of motion in extremities   Skin: no rashes or lesions  Neuro: AO*3, right-sided facial droop- improved, otherwise, neuro exam is grossly intact, talking a little bit more today - making sentences but slow  Psyc: appropriate mood and affect        Data Review:       Labs:               Recent Labs   Lab 09/13/24  0508 09/13/24  1617 09/14/24  0418 09/16/24  0431 09/17/24  0412 09/18/24  0438   RBC 3.62* 3.61*   < >  3.43* 3.46* 3.89   HGB 11.1* 11.1*   < > 10.5* 10.7* 11.7*   HCT 31.7* 31.4*   < > 30.2* 29.3* 33.4*   MCV 87.6 87.0   < > 88.0 84.7 85.9   MCH 30.7 30.7   < > 30.6 30.9 30.1   MCHC 35.0 35.4   < > 34.8 36.5 35.0   RDW 11.9 12.0   < > 12.2 11.9 12.0   NEPRELIM 2.12 7.91*  --   --  3.32  --    WBC 4.4 8.8   < > 7.0 6.2 5.9   .0 212.0   < > 235.0 258.0 340.0    < > = values in this interval not displayed.                   Recent Labs   Lab 09/16/24  0431 09/17/24  0412 09/17/24  1857 09/18/24  0438   * 99  --  95   BUN 7* 7*  --  7*   CREATSERUM 0.52* 0.51*  --  0.66   EGFRCR 111 112  --  105   CA 9.2 9.7  --  10.0    140  --  139   K 3.7 3.6 3.9 4.1    105  --  103   CO2 26.0 28.2  --  28.0              Recent Labs   Lab 09/13/24  0508 09/13/24  1617   ALT 17 15   AST 13 15   ALB 4.1 3.9            Imaging:  No results found.        Meds:      Scheduled Medications    midodrine  5 mg Oral TID    [START ON 9/19/2024] multivitamin with minerals  1 tablet Oral Daily    sennosides  17.2 mg Oral BID    polyethylene glycol (PEG 3350)  17 g Oral Daily    levETIRAcetam  500 mg Oral BID    atorvastatin  40 mg Oral Nightly    heparin  5,000 Units Subcutaneous 2 times per day    aspirin  325 mg Oral Daily    [Transfer Hold] vortioxetine  10 mg Oral Nightly    [Transfer Hold] progesterone  100 mg Oral Nightly         Medication Infusions    phenylephrine 25 mcg/min (09/18/24 1315)           PRN Medications     acetaminophen    magnesium hydroxide    ondansetron    HYDROcodone-acetaminophen **OR** HYDROcodone-acetaminophen    morphINE    [Transfer Hold] bisacodyl    [Transfer Hold] fleet enema        9/18 Neuro surgery    Chief Complaint:  POD #5 L EDAS for moyamoya     Subjective:  Evelio gtt on to maintain -180. Patient's HOB slowly elevated overnight which she tolerated well. Reports mild HA. Has been nauseous with decreased appetite, improved with Zofran.      Objective/Physical Exam:     Vital  Signs:  Blood pressure (!) 172/84, pulse 56, temperature 97.9 °F (36.6 °C), temperature source Temporal, resp. rate 11, weight 133 lb 2.5 oz (60.4 kg), SpO2 96%, not currently breastfeeding.  Neurologic:   Delayed speech with quiet voice. Oriented x 3.   Follows commands briskly  PERRL, EOMi  Right facial droop  R  4+/5, R triceps 4+/5 ow full strength all other muscle groups of RUE   5/5 strength LUE and BLE     Incision clean dry and intact        Labs:           Recent Labs   Lab 09/13/24  0508 09/13/24  1617 09/14/24  0418 09/16/24  0431 09/17/24 0412 09/18/24  0438   RBC 3.62* 3.61*   < > 3.43* 3.46* 3.89   HGB 11.1* 11.1*   < > 10.5* 10.7* 11.7*   HCT 31.7* 31.4*   < > 30.2* 29.3* 33.4*   MCV 87.6 87.0   < > 88.0 84.7 85.9   MCH 30.7 30.7   < > 30.6 30.9 30.1   MCHC 35.0 35.4   < > 34.8 36.5 35.0   RDW 11.9 12.0   < > 12.2 11.9 12.0   NEPRELIM 2.12 7.91*  --   --  3.32  --    WBC 4.4 8.8   < > 7.0 6.2 5.9   .0 212.0   < > 235.0 258.0 340.0    < > = values in this interval not displayed.                Recent Labs   Lab 09/16/24  0431 09/17/24 0412 09/17/24  1857 09/18/24 0438   * 99  --  95   BUN 7* 7*  --  7*   CREATSERUM 0.52* 0.51*  --  0.66   EGFRCR 111 112  --  105   CA 9.2 9.7  --  10.0    140  --  139   K 3.7 3.6 3.9 4.1    105  --  103   CO2 26.0 28.2  --  28.0      Assessment:  54-year-old  s/p left-sided EDAS for symptomatic moyamoya disease     Plan:  -Optimization per NCC  -Q2' neurochecks  -Pain control  -Nutrition consult in setting of decreased appetite and nausea for possible nutritional supplements  -Continue to wash hair and incision site and keep incision open to air, clean and dry   -SCDs and SQH for DVT ppx  -Continue SBP goal 150-180 and plan to add Midodrine and wean off IV phenylephrine.  -Continue daily ASA 325mg  -ADAT  -PT/OT/OOB  -Maintain euvolemia  -Continue Keppra  -Dispo planning      Patient seen with Dr. Thomas Rosenberg,  [facility]  [facility]  9/18/2024 12:48 PM      Is this a shared or split note between Advanced Practice Provider and Physician? Yes              Attestation signed by Cornelio Guzman MD at 9/18/2024  7:44 PM     Patient examined and assessed. Agree with above.      Neurologically stable.  No further episodes of stroke symptoms.  Blood pressure stable on phenylephrine.  Incision clean dry and intact     Will plan for transition from IV phenylephrine to midodrine and continue to monitor closely.       I discussed with the patient and her  the current clinical situation and plan of care, and they expressed understanding and agreement with the plan.                          9/19 IM        Assessment/Plan:      Active Problems:    Cerebrovascular accident (HCC)    Moyamoya    Moyamoya disease    Aphasia    Facial droop    Hypokalemia    Anemia    Atonic constipation        Patient is a 54 year old female with PMH sig for ADHD who presented for elective craniotomy for moyamoya disease.  She is status post EDAS.     Recent CVA  Moyamoya disease  Status post EDAS, POD 6  - Neurochecks  - Pain management per neurosurgery  - PT/OT/speech evaluation-stable  - BP parameters systolic 120-180, initially weaned off of brandon, however, BP dropped and patient was symptomatic, brandon reinitiated, plan to wean off with midodrine  -Keppra 500 mgTwice dailyx 7 days postsurgery  -Neurosurgery and neurointensivist following     R sided facial droop-improved  R sided weakness- improved  - frequent neurochecks,   - adequate BP control  - Speech eval-diet advanced     Hypotension- resolved  Bradycardia- resolved  Right-sided symptoms- resolved  - Likely in the setting of hypotension and bradycardia  - Symptoms have resolved as of 9/17  - Continue to maintain BP within range per neuro  -Every 2h neurochecks per neurosurgery     Bradycardia  - noted on tele, patient asymptomatic  - pt has \"erratic HR\" per family, likely high  in the setting of adderall  - obtain EKG-reviewed, no blocks noted  - telemetry     ADHD  - Recommend resuming home Adderall, might assist with improved blood pressure and heart rate        Prophylaxis:   DVT with scd, heparin subcu  Atrophy Prophylaxis PT/OT  Lines/Monitors:      Dispo: CCU  Code status: Full     Patient and/or patient's family given opportunity to ask questions and note understanding and agreeing with therapeutic plan as outlined     Thank you for allowing me to participate in the care of this patient.      Thank You,  DO MICKEY Haji Hospitalist  Pager   Answering Service number: [phone]         Subjective:      Seen and examined at bedside.  Still on brandon-.  Receiving midodrine however difficult time weaning off brandon at this time.  No new symptoms.   at bedside.        OBJECTIVE:     Blood pressure (!) 162/75, pulse 61, temperature 98.2 °F (36.8 °C), temperature source Temporal, resp. rate 11, weight 126 lb 5.2 oz (57.3 kg), SpO2 95%, not currently breastfeeding.     Temp:  [97 °F (36.1 °C)-98.9 °F (37.2 °C)] 98.2 °F (36.8 °C)  Pulse:  [52-76] 61  Resp:  [7-16] 11  BP: (127-194)/() 162/75  SpO2:  [92 %-98 %] 95 %        Intake/Output:     Intake/Output Summary (Last 24 hours) at 9/19/2024 1438  Last data filed at 9/19/2024 1400      Gross per 24 hour   Intake 1181.43 ml   Output 875 ml   Net 306.43 ml              Last 3 Weights   09/18/24 0800 126 lb 5.2 oz (57.3 kg)   09/15/24 0400 133 lb 2.5 oz (60.4 kg)   09/14/24 0444 132 lb 11.5 oz (60.2 kg)   09/12/24 1944 134 lb 14.7 oz (61.2 kg)   09/12/24 1926 134 lb 14.7 oz (61.2 kg)   09/04/24 0600 134 lb 3.2 oz (60.9 kg)   08/30/24 0928 132 lb (59.9 kg)   08/28/24 0213 133 lb 9.6 oz (60.6 kg)   08/28/24 0150 133 lb 8 oz (60.6 kg)   08/27/24 1840 132 lb (59.9 kg)         Exam   Gen: No acute distress, alert and oriented x3, no focal neurologic deficits, tired  Heent: Surgical staples intact, clean and dry, drain has been  removed   pulm: Lungs clear, normal respiratory effort  CV: Heart with regular rate and rhythm, no peripheral edema  Abd: Abdomen soft, nontender, nondistended, bowel sounds present  MSK: Full range of motion in extremities   Skin: no rashes or lesions  Neuro: AO*3, right-sided facial droop, otherwise, neuro exam is grossly intact, delayed speech   Psyc: Depressed mood        Data Review:       Labs:              Recent Labs   Lab 09/13/24  1617 09/14/24  0418 09/17/24  0412 09/18/24  0438 09/19/24  0528   RBC 3.61*   < > 3.46* 3.89 3.83   HGB 11.1*   < > 10.7* 11.7* 11.6*   HCT 31.4*   < > 29.3* 33.4* 33.2*   MCV 87.0   < > 84.7 85.9 86.7   MCH 30.7   < > 30.9 30.1 30.3   MCHC 35.4   < > 36.5 35.0 34.9   RDW 12.0   < > 11.9 12.0 12.2   NEPRELIM 7.91*  --  3.32  --  2.79   WBC 8.8   < > 6.2 5.9 4.3   .0   < > 258.0 340.0 271.0    < > = values in this interval not displayed.                   Recent Labs   Lab 09/17/24  0412 09/17/24  1857 09/18/24 0438 09/19/24  0528   GLU 99  --  95 94   BUN 7*  --  7* 7*   CREATSERUM 0.51*  --  0.66 0.65   EGFRCR 112  --  105 105   CA 9.7  --  10.0 9.6     --  139 138   K 3.6 3.9 4.1 3.9     --  103 100   CO2 28.2  --  28.0 28.0              Recent Labs   Lab 09/13/24  0508 09/13/24  1617   ALT 17 15   AST 13 15   ALB 4.1 3.9            Imaging:  No results found.        Meds:      Scheduled Medications    [START ON 9/20/2024] sennosides  8.6 mg Oral Daily    multivitamin with minerals  1 tablet Oral Daily    midodrine  10 mg Oral TID    levETIRAcetam  500 mg Oral BID    atorvastatin  40 mg Oral Nightly    heparin  5,000 Units Subcutaneous 2 times per day    aspirin  325 mg Oral Daily    vortioxetine  10 mg Oral Nightly    [Transfer Hold] progesterone  100 mg Oral Nightly         Medication Infusions    phenylephrine 20 mcg/min (09/19/24 1413)                9/19 Neuro surgery    Chief Complaint:  POD #6 L EDAS for moyamoya     Subjective:  Midodrine started  TID to assist to help wean of pressors.        Objective/Physical Exam:     Vital Signs:  Blood pressure 133/69, pulse 57, temperature 97.9 °F (36.6 °C), temperature source Temporal, resp. rate 12, weight 126 lb 5.2 oz (57.3 kg), SpO2 95%, not currently breastfeeding.  Neurologic:   Delayed speech with quiet voice. Oriented x 3.   Follows commands briskly  PERRL, EOMi  Right facial droop  R  4+/5, R triceps 4+/5 ow full strength all other muscle groups of RUE   5/5 strength LUE and BLE     Incision clean dry and intact        Labs:          Recent Labs   Lab 09/13/24  1617 09/14/24 0418 09/17/24 0412 09/18/24 0438 09/19/24  0528   RBC 3.61*   < > 3.46* 3.89 3.83   HGB 11.1*   < > 10.7* 11.7* 11.6*   HCT 31.4*   < > 29.3* 33.4* 33.2*   MCV 87.0   < > 84.7 85.9 86.7   MCH 30.7   < > 30.9 30.1 30.3   MCHC 35.4   < > 36.5 35.0 34.9   RDW 12.0   < > 11.9 12.0 12.2   NEPRELIM 7.91*  --  3.32  --  2.79   WBC 8.8   < > 6.2 5.9 4.3   .0   < > 258.0 340.0 271.0    < > = values in this interval not displayed.                Recent Labs   Lab 09/17/24 0412 09/17/24  1857 09/18/24 0438 09/19/24  0528   GLU 99  --  95 94   BUN 7*  --  7* 7*   CREATSERUM 0.51*  --  0.66 0.65   EGFRCR 112  --  105 105   CA 9.7  --  10.0 9.6     --  139 138   K 3.6 3.9 4.1 3.9     --  103 100   CO2 28.2  --  28.0 28.0      Assessment:  54-year-old  s/p left-sided EDAS for symptomatic moyamoya disease     Plan:  -Optimization per NCC  -Q2' neurochecks  -Pain control  -Continue to wash hair and incision site and keep incision open to air, clean and dry   -SCDs and SQH for DVT ppx  -Will liberalize BP parameters per NCC to titrate off pressors  -Continue daily ASA 325mg  -ADAT  -PT/OT/OOB  -Maintain euvolemia  -Continue Keppra x7days  -Dispo planning                MEDICATIONS ADMINISTERED IN LAST 1 DAY:  acetaminophen (Tylenol) tab 650 mg       Date Action Dose Route User    9/19/2024 1827 Given 650 mg Oral Berna Wong,  RN    9/19/2024 1214 Given 650 mg Oral Berna Wong RN          aspirin tab 325 mg       Date Action Dose Route User    9/20/2024 0843 Given 325 mg Oral Patricia Michel RN          atorvastatin (Lipitor) tab 40 mg       Date Action Dose Route User    9/19/2024 1944 Given 40 mg Oral Nataly Samuel RN          heparin (Porcine) 5000 UNIT/ML injection 5,000 Units       Date Action Dose Route User    9/20/2024 0842 Given 5,000 Units Subcutaneous (Left Upper Abdomen) Patricia Michel RN    9/19/2024 1944 Given 5,000 Units Subcutaneous (Flank) Nataly Samuel RN          HYDROcodone-acetaminophen (Norco)  MG per tab 1 tablet       Date Action Dose Route User    9/19/2024 1942 Given 1 tablet Oral Nataly Samuel RN          levETIRAcetam (Keppra) tab 500 mg       Date Action Dose Route User    9/19/2024 1728 Given 500 mg Oral Berna Wong RN          midodrine (ProAmatine) tab 10 mg       Date Action Dose Route User    9/20/2024 0602 Given 10 mg Oral Nataly Samuel RN    9/19/2024 1728 Given 10 mg Oral Berna Wong RN    9/19/2024 1214 Given 10 mg Oral Berna Wong RN          ondansetron (Zofran) 4 MG/2ML injection 4 mg       Date Action Dose Route User    9/19/2024 2211 Given 4 mg Intravenous Nataly Samuel RN          phenylephrine in sodium chloride 0.9% (Evelio-Synephrine) 50 mg/250mL infusion premix       Date Action Dose Route User    9/20/2024 0043 Restarted 10 mcg/min Intravenous Nataly Samuel RN    9/19/2024 1900 Rate/Dose Verify 10 mcg/min Intravenous Berna Wong RN    9/19/2024 1815 Rate/Dose Change 10 mcg/min Intravenous Berna Wong RN    9/19/2024 1600 Rate/Dose Verify 20 mcg/min Intravenous Berna Wong RN    9/19/2024 1500 Rate/Dose Verify 20 mcg/min Intravenous Berna Wong RN    9/19/2024 1413 Rate/Dose Change 20 mcg/min Intravenous Berna Wong RN    9/19/2024 1400 Rate/Dose Change 30 mcg/min Intravenous Berna Wong, RN     9/19/2024 1200 Rate/Dose Change 35 mcg/min Intravenous Berna Wong RN    9/19/2024 1130 Rate/Dose Change 40 mcg/min Intravenous Berna Wong RN          potassium chloride 20 mEq/100mL IVPB premix 20 mEq       Date Action Dose Route User    9/20/2024 0602 New Bag 20 mEq Intravenous Nataly Samuel RN          sennosides (Senokot) tab 8.6 mg       Date Action Dose Route User    9/20/2024 0843 Given 8.6 mg Oral Patricia Michel RN          multivitamin with minerals (Thera M Plus) tab 1 tablet       Date Action Dose Route User    9/20/2024 0843 Given 1 tablet Oral Patricia Michel RN          vortioxetine (Trintellix) tab 10 mg       Date Action Dose Route User    9/19/2024 1944 Given 10 mg Oral Nataly Samuel RN            Vitals (last day)       Date/Time Temp Pulse Resp BP SpO2 Weight O2 Device O2 Flow Rate (L/min) [facility]    09/20/24 1000 -- 78 14 136/72 95 % -- -- --     09/20/24 0900 -- 78 16 138/84 97 % -- -- --     09/20/24 0800 98 °F (36.7 °C) 75 13 145/84 96 % -- None (Room air) --     09/20/24 0735 -- 73 23 154/86 100 % -- -- --     09/20/24 0600 -- 79 18 143/88 97 % -- -- --     09/20/24 0500 -- 66 15 146/80 96 % -- None (Room air) -- AL    09/20/24 0400 98.1 °F (36.7 °C) 60 14 131/84 95 % -- None (Room air) -- AL    09/20/24 0300 -- 60 13 153/92 97 % -- None (Room air) -- AL    09/20/24 0200 -- 55 11 149/92 96 % -- None (Room air) -- AL    09/20/24 0100 -- 62 14 116/75 93 % -- -- -- AL    09/20/24 0043 -- 61 13 122/74 95 % -- -- -- AL    09/20/24 0000 -- 62 10 133/74 97 % -- None (Room air) -- AL    09/19/24 2300 -- 72 11 132/78 96 % -- -- -- AL    09/19/24 2200 -- 58 12 146/83 97 % -- -- -- AL    09/19/24 2100 -- 64 14 154/80 97 % -- -- -- AL    09/19/24 2000 98.6 °F (37 °C) 61 12 140/79 96 % -- None (Room air) -- AL    09/19/24 1900 -- 61 16 174/85 98 % -- None (Room air) -- AT    09/19/24 1845 -- 56 13 -- 96 % -- -- -- AT    09/19/24 1830 -- 64 7 159/88 97 % -- -- -- AT     09/19/24 1800 -- 65 10 149/78 96 % -- None (Room air) -- AT    09/19/24 1730 -- 67 15 144/80 97 % -- None (Room air) -- AT    09/19/24 1700 -- 59 10 127/76 96 % -- None (Room air) -- AT    09/19/24 1645 -- 68 12 128/70 93 % -- -- -- AT    09/19/24 1630 -- 69 12 122/78 95 % -- -- -- AT    09/19/24 1600 -- 70 10 120/74 94 % -- -- -- AT    09/19/24 1545 97.9 °F (36.6 °C) 57 12 133/69 95 % -- -- -- AT    09/19/24 1530 -- 60 19 133/74 94 % -- -- -- AT    09/19/24 1515 -- 59 11 146/81 94 % -- -- -- AT    09/19/24 1500 -- 61 10 134/78 92 % -- None (Room air) -- AT    09/19/24 1415 -- 61 11 162/75 95 % -- -- -- AT    09/19/24 1414 -- 61 12 194/88 97 % -- -- -- AT    09/19/24 1400 -- 62 14 191/77 95 % -- None (Room air) -- AT    09/19/24 1330 -- 61 13 166/87 94 % -- -- -- AT    09/19/24 1300 -- 62 14 160/72 94 % -- None (Room air) -- AT    09/19/24 1230 -- 76 12 133/82 96 % -- -- -- AT    09/19/24 1215 -- 72 7 137/79 95 % -- -- -- AT    09/19/24 1200 98.2 °F (36.8 °C) 70 16 147/83 96 % -- None (Room air) -- AT    09/19/24 1145 -- 58 11 180/86 96 % -- -- -- AT    09/19/24 1130 -- 57 10 182/84 95 % -- -- -- AT    09/19/24 1115 -- 57 13 154/77 98 % -- -- -- AT    09/19/24 1100 -- 57 10 157/79 97 % -- None (Room air) -- AT    09/19/24 1045 -- 61 13 144/85 96 % -- -- -- AT    09/19/24 1030 -- 70 12 137/88 92 % -- -- -- AT    09/19/24 1027 -- 75 14 137/89 97 % -- -- -- AT    09/19/24 1015 -- 69 10 130/76 97 % -- -- -- AT    09/19/24 1000 -- 73 11 129/81 96 % -- -- -- AT    09/19/24 0930 -- 70 14 154/81 98 % -- -- -- AT    09/19/24 0900 -- 64 10 169/87 93 % -- None (Room air) -- AT    09/19/24 0800 98.1 °F (36.7 °C) 55 9 159/75 97 % -- None (Room air) -- AT    09/19/24 0700 -- 66 10 148/74 96 % -- None (Room air) -- AL    09/19/24 0630 -- 60 13 185/80 94 % -- None (Room air) -- AL    09/19/24 0600 -- 55 12 148/78 97 % -- None (Room air) -- AL    09/19/24 0500 -- 56 10 170/85 95 % -- None (Room air) -- AL    09/19/24 0400 -- 54  12 151/82 98 % -- None (Room air) -- AL    09/19/24 0300 98.6 °F (37 °C) 56 10 157/78 96 % -- None (Room air) -- AL    09/19/24 0200 -- 54 10 146/77 97 % -- None (Room air) -- AL    09/19/24 0130 -- 56 10 142/73 96 % -- None (Room air) -- AL    09/19/24 0100 -- 57 9 179/75 96 % -- None (Room air) -- AL    09/19/24 0000 98.9 °F (37.2 °C) 53 11 182/83 96 % -- None (Room air) -- AL                09/18/24 0330 -- 54 12 176/77 Abnormal  98 % -- -- -- MS   09/18/24 0300 -- 55 10 158/79 96 % -- -- -- MS   09/18/24 0230 -- 52 8 Abnormal  193/95 Abnormal  95 % -- -- -- MS   09/18/24 0200 -- 56 9 Abnormal  165/87 Abnormal  94 % -- -- -- MS   09/18/24 0130 -- 57 9 Abnormal  168/72 Abnormal  98 % -- -- -- MS   09/18/24 0100 -- 56 9 Abnormal  181/79 Abnormal  97 % -- -- -- MS   09/18/24 0030 -- 55 10 185/96 Abnormal  96 % -- -- -- MS

## 2024-09-20 NOTE — PROGRESS NOTES
[facility]   NEUROCRITICAL CARE   PROGRESS NOTE    Admission date: 9/12/2024  Reason for Consult: Post op EDAS  Chief Complaint: Recent stroke  ________________________________________________________________    SUBJECTIVE     24 Hour Significant Events: Exam stable while weaning off brandon, off since 6 AM this morning. Vomited yesterday, didn't sleep well so drowsy this AM.     OBJECTIVE   VITAL SIGNS:   Temp:  [97.9 °F (36.6 °C)-98.6 °F (37 °C)] 98 °F (36.7 °C)  Pulse:  [55-79] 75  Resp:  [7-23] 13  BP: (116-194)/(69-92) 145/84  SpO2:  [92 %-100 %] 96 %    INTAKE/OUTPUT:  Intake/Output Summary (Last 24 hours) at 9/20/2024 0815  Last data filed at 9/20/2024 0602  Gross per 24 hour   Intake 1563.23 ml   Output 900 ml   Net 663.23 ml     PHYSICAL EXAM:  GENERAL APPEARANCE: Patient resting comfortably in bed, NAD  HEENT: Normocephalic, atraumatic.   LUNGS: Normal respiratory rate and effort   HEART: Regular rate and rhythm   ABDOMEN: Soft. No tenderness, guarding, or rebound.     NEUROLOGIC:    Mental Status:  A&O x 4, Follows simple commands, slow mildly dysarthric speech, word output improving, able to name and repeat  Cranial nerves: PERRL.  Visual fields full.  EOMI.  R droop improving, more movement in face now.  Hearing grossly intact. Tongue midline   Motor: Drift:  RUE pronation; Motor exam is 5 out of 5 in all extremities bilaterally except 5-/5 in RUE   Sensation: Intact to light touch bilaterally  Cerebellar: Normal Finger-To-Nose test    LABORATORY DATA:  Last 24 hour labs were reviewed in detail.  Recent Labs   Lab 09/18/24  0438 09/19/24  0528 09/20/24  0431    138 137   K 4.1 3.9 3.7    100 98   CO2 28.0 28.0 29.0   GLU 95 94 93   BUN 7* 7* 8*   CREATSERUM 0.66 0.65 0.69     Recent Labs   Lab 09/18/24  0438 09/19/24  0528 09/20/24  0431   WBC 5.9 4.3 4.5   HGB 11.7* 11.6* 11.9*   .0 271.0 262.0     Recent Labs   Lab 09/13/24  1617   ALT 15   AST 15     Recent Labs    Lab 09/14/24  0418 09/15/24  0406 09/16/24  0431   MG 1.7 2.1 1.9       Scheduled Meds:   sennosides  8.6 mg Oral Daily    multivitamin with minerals  1 tablet Oral Daily    midodrine  10 mg Oral TID    atorvastatin  40 mg Oral Nightly    heparin  5,000 Units Subcutaneous 2 times per day    aspirin  325 mg Oral Daily    vortioxetine  10 mg Oral Nightly    [Transfer Hold] progesterone  100 mg Oral Nightly     Continuous Infusions:   phenylephrine Stopped (09/20/24 0602)     PRN Meds:  acetaminophen    magnesium hydroxide    ondansetron    HYDROcodone-acetaminophen **OR** HYDROcodone-acetaminophen    morphINE    [Transfer Hold] bisacodyl    [Transfer Hold] fleet enema    Radiology:    No results found.  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  Liberalize neurochecks nightly if okay with neurosurgery     Cardiovascular:         - SBP goal as noted above          - Hold home medications         - Evelio gtt PRN weaning off      Pulmonary: - Saturating well on RA, encourage IS      Renal:         - Monitor I&Os           -Daily BMP       Gastrointestinal:  Nutrition: - Gen diet     Atonic constipation  - Bowel regimen scheduled 9/17, had BM later in evening, continue current regimen      Infectious Disease: - No leukocytosis, afebrile, continue to monitor      Heme/Onc   Anemia - Hb goal > 7, daily CBC     Endocrine: - Accuchecks q6 with SSI prn            Checklist         - Lines: PIVs         - DVT Prophylaxis: SCDs, HSQ         - Diet: ADAT         - IVF: -          - Electrolytes: Replete per protocol         - Code Status: FULL     Dispo: CNICU pending stability off of pressors      Greater than 40 minutes of critical care time (exclusive of billable procedures) was administered to manage and/or prevent neurologic instability. This involved direct patient intervention, complex decision making, and/or extensive discussions with the patient, family, and clinical staff.    Dustin Dillard MD  Neurocritical Care  [facility]

## 2024-09-20 NOTE — PHYSICAL THERAPY NOTE
PHYSICAL THERAPY TREATMENT NOTE - INPATIENT    Room Number: 6620/6620-A     Session: 2     Number of Visits to Meet Established Goals: 4  History related to current admission: Patient is a 54 year old female admitted on 9/12/2024 from home for L crani.  Pt with recent L hemispheric stroke on 9/3, discharged home on 9/5.  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  Post-op with aphasia and R UE weakness.  CTH stable. Hypotensive/bradycardic episode while in the chair 9/17 - placed on bedrest through 9/18, placed back on bedrest through 9/20.         HOME SITUATION  Type of Home: House   Home Layout: Two level     Lives With: Spouse (one child living at home, second child away at school)  Drives: Yes     Prior Level of [place]: per  at b/s: community indep; after recent stroke, pt indep, did not return to work, however was busy with scrapbooking activities; reported deficits with slow speech, declines difficulty with word finding  Presenting Problem: moyamoya  Co-Morbidities : L hemispheric stroke 9/3/2024, ADHD, vit D deficiency, mcclain mcclain    ASSESSMENT   Patient demonstrates good  progress this session, goals remain in progress.After being on bedrest pt still demonstrating good functional strength during transfer from bed > commode > bedside chair.     Patient continues to function below baseline with bed mobility, transfers, gait, and stair negotiation. Contributing factors to remaining limitations include decreased endurance/aerobic capacity, decreased muscular endurance, and medical status.  Next session anticipate patient to progress gait and stair negotiation.  Physical Therapy will continue to follow patient for duration of hospitalization.    Patient continues to benefit from continued skilled PT services: for duration of hospitalization, however, given the patient is functioning near baseline level do not anticipate skilled therapy needs at discharge .     PLAN  PT  Treatment Plan: Patient education;Gait training;Neuromuscular re-educate;Transfer training;Stair training;Balance training  Rehab Potential : Good  Frequency (Obs):  (1-3x per week)    CURRENT GOALS     Goal #1 Patient is able to demonstrate supine - sit EOB @ level: independent      Goal #2 Patient is able to demonstrate transfers EOB to/from Chair/Wheelchair at assistance level: modified independent      Goal #3 Patient is able to ambulate 200 feet with assist device: none at assistance level: supervision      Goal #4     Goal #5     Goal #6     Goal Comments: Goals established on 2024 all goals ongoing    SUBJECTIVE      OBJECTIVE  Precautions:  (-180)    WEIGHT BEARING RESTRICTION  Weight Bearing Restriction: None                PAIN ASSESSMENT   Ratin  Location: L incisional/neck  Management Techniques: Activity promotion;Body mechanics;Repositioning    BALANCE                                                                                                                       Static Sitting: Good  Dynamic Sitting: Good           Static Standing: Fair -  Dynamic Standing: Fair -    ACTIVITY TOLERANCE           BP: 127/76  BP Location: Right arm  BP Method: Automatic  Patient Position: Sitting (post transfer to bedside chair)    O2 WALK         AM-PAC '6-Clicks' INPATIENT SHORT FORM - BASIC MOBILITY  How much difficulty does the patient currently have...  Patient Difficulty: Turning over in bed (including adjusting bedclothes, sheets and blankets)?: None   Patient Difficulty: Sitting down on and standing up from a chair with arms (e.g., wheelchair, bedside commode, etc.): None   Patient Difficulty: Moving from lying on back to sitting on the side of the bed?: None   How much help from another person does the patient currently need...   Help from Another: Moving to and from a bed to a chair (including a wheelchair)?: None   Help from Another: Need to walk in hospital room?: A Little    Help from Another: Climbing 3-5 steps with a railing?: A Little       AM-PAC Score:  Raw Score: 22   Approx Degree of Impairment: 20.91%   Standardized Score (AM-PAC Scale): 53.28   CMS Modifier (G-Code): CJ    FUNCTIONAL ABILITY STATUS  Gait Assessment   Functional Mobility/Gait Assessment  Gait Assistance: Contact guard assist  Distance (ft): 3,3  Assistive Device: Rolling walker  Pattern: Within Functional Limits    Skilled Therapy Provided    Bed Mobility:  Rolling: indep  Supine<>Sit: indep     Transfer Mobility:  Sit<>Stand: SBA   Stand<>Sit: SBA   Gait: SBA    Therapist's Comments: Pt BP assessed throughout session and remained in parameters (consistently in 120s for SBP - see flowsheet for detail). Performed ankle pumps. Heel slides, SLR x10 in supine and then LAQ x10 in sitting. Denied dizziness thorughout activity. Cued for use of RW 2/2 prolonged bedrest for safety. Able to utilize appropriately during transfer from bed > bedside commode > bedside chair     Patient End of Session: Up in chair;Needs met;Call light within reach;RN aware of session/findings;All patient questions and concerns addressed;Family present    PT Session Time: 23 minutes  Gait Training:  minutes  Therapeutic Activity: 23 minutes  Therapeutic Exercise:  minutes   Neuromuscular Re-education:  minutes

## 2024-09-20 NOTE — PROGRESS NOTES
[facility]  Neurosurgery Progress Note    Isabella Kevin Patient Status:  Inpatient    1970 MRN [id number]   Location [facility] 6NE-A Attending Cornelio Guzman MD   Hosp Day # 8 PCP Mildred Steward MD     Chief Complaint:  POD #7 L EDAS for moyamoya    Subjective:  Evelio gtt stopped at 0600. Patient reports not feeling well this morning, overall feeling weak and fatigued. Has continued on bedrest.     Objective/Physical Exam:    Vital Signs:  Blood pressure 136/72, pulse 78, temperature 98 °F (36.7 °C), temperature source Temporal, resp. rate 14, weight 126 lb 5.2 oz (57.3 kg), SpO2 95%, not currently breastfeeding.  Neurologic:   Appears tired on exam  Delayed speech with quiet voice. Oriented x 3.   Follows commands briskly  PERRL, EOMi  Right facial droop  R  4+/5, R triceps 4+/5 ow full strength all other muscle groups of RUE   5/5 strength LUE and BLE     Incision clean dry and intact      Labs:  Recent Labs   Lab 24  0412 24  04324  0524  0431   RBC 3.46* 3.89 3.83 3.96   HGB 10.7* 11.7* 11.6* 11.9*   HCT 29.3* 33.4* 33.2* 34.0*   MCV 84.7 85.9 86.7 85.9   MCH 30.9 30.1 30.3 30.1   MCHC 36.5 35.0 34.9 35.0   RDW 11.9 12.0 12.2 12.2   NEPRELIM 3.32  --  2.79 3.07   WBC 6.2 5.9 4.3 4.5   .0 340.0 271.0 262.0       Recent Labs   Lab 24  0438 24  0524  0431   GLU 95 94 93   BUN 7* 7* 8*   CREATSERUM 0.66 0.65 0.69   EGFRCR 105 105 104   CA 10.0 9.6 10.0    138 137   K 4.1 3.9 3.7    100 98   CO2 28.0 28.0 29.0     Assessment:  54-year-old  s/p left-sided EDAS for symptomatic moyamoya disease    Plan:  -Optimization per NCC  -Q2' neurochecks  -Pain control  -Continue to wash hair and incision site and keep incision open to air, clean and dry   -SCDs and SQH for DVT ppx  -Continue to liberalize BP parameters per NCC to continue off pressors  -Continue daily ASA 325mg  -ADAT  -Continue bedrest this  morning. If patient doing well, ok to work with PT this afternoon and can sit up in bedside chair.   -Maintain euvolemia  -Continue Keppra x7days total  -Dispo planning     Patient seen with Dr. Thomas Rosenberg PA-C  [facility]  9/20/2024 12:22 PM     Is this a shared or split note between Advanced Practice Provider and Physician? Yes

## 2024-09-20 NOTE — DIETARY NOTE
[facility]   part of [facility]    NUTRITION ASSESSMENT    Pt meets severe malnutrition criteria at this time.    CRITERIA FOR MALNUTRITION DIAGNOSIS:  Criteria for severe malnutrition diagnosis: acute illness/injury related to wt loss greater than 2% in 1 week and energy intake less than 50% for greater than 5 days      NUTRITION INTERVENTION:    Meal and Snacks - Monitor and encourage adequate PO intake. Regular/general  Medical Food Supplements - [employer] 1.4 BID vanila. Rationale/use for oral supplements discussed. Please send at breakfast and dinner even if tray is not ordered by patient.  Vitamin and Mineral Supplements - adding Multivitamin with minerals  Coordination of Nutrition Care - GI/Surgery consult for nutrition support/diet advancement       PATIENT STATUS:   9/20: Pt is still not able to eat much and has not had adequate nutrition for 8 days. Pt was given Magic Cup but did not like. Switched to [employer] 1.4 and will assess adherence.  at bedside claimed pt was nauseous and had emesis yesterday but has resolved today. No other complaints besides lack of appetite. Pt has been weaned off pressor. If pt is not able to tolerate ONS and food, may need to consider alternative means of nutrition.    09/18/24 54/F admitted d/t elective craniotomy surgery. Screened d/t consult for low appetite. Surgery went well, improvement in arm strength and speech. Pt has become hypotensive needing brandon-synephrine. Pt reports appetite has been low since surgery. States she only takes a few bites of food. Family in room brought food for her today, but not eating much. Per EMR pat has lost significant wt of 8 lbs (6%) within 12 days. Offered EPHP but refused. Pt willing to try Magic cup berry flavored BID. At F/U check for adherence to eating ONS.    PMH: ADHD, moyamoya disease with L ischemic stroke, Vit D, osteoporosis  Procedure: Left-sided frontotemporal wobvthhah-dgrm-ldken-synangiosis (EDAS)  indirect bypass 9/13      ANTHROPOMETRICS:  Ht:  157.5 cm (5' 2\")  Wt: 57.3 kg (126 lb 5.2 oz).   BMI: Body mass index is 23.1 kg/m².  IBW: 50 kg      WEIGHT HISTORY:   Weight loss: Yes, Severe Wt loss of 8 lbs, 6%, over 12 days     Wt Readings from Last 10 Encounters:   09/18/24 57.3 kg (126 lb 5.2 oz)   09/04/24 60.9 kg (134 lb 3.2 oz)   08/28/24 60.6 kg (133 lb 9.6 oz)   06/21/22 59 kg (130 lb)   02/09/22 58 kg (127 lb 12.8 oz)   08/02/21 59 kg (130 lb)   03/17/21 59.4 kg (131 lb)   07/30/19 59 kg (130 lb)   05/14/19 56.7 kg (125 lb)   04/23/18 56.7 kg (125 lb)        NUTRITION:  Diet:       Procedures    Regular/General diet Texture Consistency: Regular; Is Patient on Accuchecks? No      Food Allergies: No  Cultural/Ethnic/Mosque Preferences Addressed: Yes    Percent Meals Eaten (last 3 days)       Date/Time Percent Meals Eaten (%)    09/17/24 1030 50 %    09/17/24 1423 25 %    09/18/24 1100 75 %    09/19/24 1000 100 %    09/19/24 1400 50 %            GI system review: WNL Last BM Date: 09/18/24  Skin and wounds: head    NUTRITION RELATED PHYSICAL FINDINGS:     1. Body Fat/Muscle Mass: no wasting noted, malnutrition related to PO intake and weight loss     2. Fluid Accumulation: upper extremity edema     NUTRITION PRESCRIPTION: 57.3 kg  Calories: 7740-0097 calories/day (25-30 kcal/kg)  Protein:  grams protein/day (1.3-1.8 grams protein per kg)  Fluid: ~1 ml/kcal or per MD discretion    NUTRITION DIAGNOSIS/PROBLEM:  Malnutrition related to insufficient appetite resulting in inadequate nutrition intake as evidenced by documented/reported insufficient oral intake and documented/reported unintentional weight loss      MONITOR AND EVALUATE/NUTRITION GOALS:  PO intake of 75% of meals TID - Not met, Continues  PO intake of 75% of oral nutrition supplement/s - Not met, Continues  Weight stable within 1 to 2 lbs during admission - Ongoing      MEDICATIONS:  Senokot, MV with mineral, Kcl 20  mEq    LABS:  Reviewed    Pt is at High nutrition risk    Owen EppsMarcy  Dietetic Intern  [id number]

## 2024-09-20 NOTE — PLAN OF CARE
Assumed care of pt at 0730. Q2H Neuros R facial droop. RUE pronation drift. See complex neuro flowsheet for further details. VSS on RA. Pt maintained -180. Pt tolerated being up in the chair x2. Pt voiding using the commode. Urine gamaliel colored concentrated and odorous. Encouraged pt to drink fluids. Pt's appetite is fair. Spoke to dietitian to switch her supplements. She did not like the magic cup. Pt and pt's  updated with plan of care.    Problem: NEUROLOGICAL - ADULT  Goal: Achieves stable or improved neurological status  Description: INTERVENTIONS  - Assess for and report changes in neurological status  - Initiate measures to prevent increased intracranial pressure  - Maintain blood pressure and fluid volume within ordered parameters to optimize cerebral perfusion and minimize risk of hemorrhage  - Monitor temperature, glucose, and sodium. Initiate appropriate interventions as ordered  Outcome: Progressing  Goal: Achieves maximal functionality and self care  Description: INTERVENTIONS  - Monitor swallowing and airway patency with patient fatigue and changes in neurological status  - Encourage and assist patient to increase activity and self care with guidance from PT/OT  - Encourage visually impaired, hearing impaired and aphasic patients to use assistive/communication devices  Outcome: Progressing  Goal: Absence of seizures  Description: INTERVENTIONS  - Monitor for seizure activity  - Administer anti-seizure medications as ordered  - Monitor neurological status  Outcome: Progressing  Goal: Remains free of injury related to seizure activity  Description: INTERVENTIONS:  - Maintain airway, patient safety  and administer oxygen as ordered  - Monitor patient for seizure activity, document and report duration and description of seizure to MD/LIP  - If seizure occurs, turn patient to side and suction secretions as needed  - Reorient patient post seizure  - Seizure pads on all 4 side rails  - Instruct  patient/family to notify RN of any seizure activity  - Instruct patient/family to call for assistance with activity based on assessment  Outcome: Progressing     Problem: PAIN - ADULT  Goal: Verbalizes/displays adequate comfort level or patient's stated pain goal  Description: INTERVENTIONS:  - Encourage pt to monitor pain and request assistance  - Assess pain using appropriate pain scale  - Administer analgesics based on type and severity of pain and evaluate response  - Implement non-pharmacological measures as appropriate and evaluate response  - Consider cultural and social influences on pain and pain management  - Manage/alleviate anxiety  - Utilize distraction and/or relaxation techniques  - Monitor for opioid side effects  - Notify MD/LIP if interventions unsuccessful or patient reports new pain  - Anticipate increased pain with activity and pre-medicate as appropriate  Outcome: Progressing

## 2024-09-21 NOTE — PLAN OF CARE
0645 applesauce taken, 0650 meds given. 0705 emesis x1, clear and some undigested food, ~200mL, no obvious meds identifed. Zofran given.

## 2024-09-21 NOTE — PLAN OF CARE
Assumed patient care at 0730.  Vital signs stable.  Patient alert and oriented x 4.  Patient with complaints of a headache, prn norco given.  Patient with very little appetite.  Encouraged to eat by this RN and family.  Patient up to chair with standby assist.  Neurologically intact except for a right facial droop and delayed speech.     Problem: Patient/Family Goals  Goal: Patient/Family Long Term Goal  Description: Patient's Long Term Goal: Stay out of hospital    Interventions:  - Follow up with PCP after discharge  - Take medication as prescribed  - See additional Care Plan goals for specific interventions  Outcome: Progressing  Goal: Patient/Family Short Term Goal  Description: Patient's Short Term Goal: Feel better and go home    Interventions:   - Test/procedure ordered  - Monitor labs  - See additional Care Plan goals for specific interventions  Outcome: Progressing     Problem: NEUROLOGICAL - ADULT  Goal: Achieves stable or improved neurological status  Description: INTERVENTIONS  - Assess for and report changes in neurological status  - Initiate measures to prevent increased intracranial pressure  - Maintain blood pressure and fluid volume within ordered parameters to optimize cerebral perfusion and minimize risk of hemorrhage  - Monitor temperature, glucose, and sodium. Initiate appropriate interventions as ordered  Outcome: Progressing  Goal: Achieves maximal functionality and self care  Description: INTERVENTIONS  - Monitor swallowing and airway patency with patient fatigue and changes in neurological status  - Encourage and assist patient to increase activity and self care with guidance from PT/OT  - Encourage visually impaired, hearing impaired and aphasic patients to use assistive/communication devices  Outcome: Progressing  Goal: Absence of seizures  Description: INTERVENTIONS  - Monitor for seizure activity  - Administer anti-seizure medications as ordered  - Monitor neurological status  Outcome:  Progressing  Goal: Remains free of injury related to seizure activity  Description: INTERVENTIONS:  - Maintain airway, patient safety  and administer oxygen as ordered  - Monitor patient for seizure activity, document and report duration and description of seizure to MD/LIP  - If seizure occurs, turn patient to side and suction secretions as needed  - Reorient patient post seizure  - Seizure pads on all 4 side rails  - Instruct patient/family to notify RN of any seizure activity  - Instruct patient/family to call for assistance with activity based on assessment  Outcome: Progressing     Problem: PAIN - ADULT  Goal: Verbalizes/displays adequate comfort level or patient's stated pain goal  Description: INTERVENTIONS:  - Encourage pt to monitor pain and request assistance  - Assess pain using appropriate pain scale  - Administer analgesics based on type and severity of pain and evaluate response  - Implement non-pharmacological measures as appropriate and evaluate response  - Consider cultural and social influences on pain and pain management  - Manage/alleviate anxiety  - Utilize distraction and/or relaxation techniques  - Monitor for opioid side effects  - Notify MD/LIP if interventions unsuccessful or patient reports new pain  - Anticipate increased pain with activity and pre-medicate as appropriate  Outcome: Progressing

## 2024-09-21 NOTE — PLAN OF CARE
Neuro exam unchanged thus far. Delayed responses to questions, but clear accurate answers given. Pt  and daughter at bedside early in shift. One bout of nausea, no emesis. Zofran given once. Intermittent mild headache. Tolerating up to commode with standby asst.

## 2024-09-21 NOTE — PROGRESS NOTES
[facility]  Neurosurgery Progress Note  2024    Isabella Kevin Patient Status:  Inpatient    1970 MRN [id number]   Location [facility] 6NE-A Attending Cornelio Guzman MD   Hosp Day # 9 PCP Mildred Steward MD     SUBJECTIVE:  Isabella Kevin is a(n) 54 year old female status post indirect bypass for moyamoya  She is sleeping  No issues per nurse      OBJECTIVE / PHYSICAL EXAM:  Vital Signs:  Blood pressure 126/80, pulse 71, temperature 97.8 °F (36.6 °C), temperature source Temporal, resp. rate 12, weight 125 lb 7.1 oz (56.9 kg), SpO2 96%, not currently breastfeeding.  Sleeping  Easily arouses  Follows commands x 4      Lab Results (last 24 hours):  Recent Results (from the past 24 hour(s))   Potassium    Collection Time: 24  5:14 AM   Result Value Ref Range    Potassium 3.8 3.5 - 5.1 mmol/L   CBC, Platelet; No Differential    Collection Time: 24  5:14 AM   Result Value Ref Range    WBC 4.7 4.0 - 11.0 x10(3) uL    RBC 3.65 (L) 3.80 - 5.30 x10(6)uL    HGB 11.1 (L) 12.0 - 16.0 g/dL    HCT 31.5 (L) 35.0 - 48.0 %    .0 150.0 - 450.0 10(3)uL    MCV 86.3 80.0 - 100.0 fL    MCH 30.4 26.0 - 34.0 pg    MCHC 35.2 31.0 - 37.0 g/dL    RDW 12.3 %   Basic Metabolic Panel (8)    Collection Time: 24  5:14 AM   Result Value Ref Range    Glucose 90 70 - 99 mg/dL    Sodium 136 136 - 145 mmol/L    Potassium 3.8 3.5 - 5.1 mmol/L    Chloride 101 98 - 112 mmol/L    CO2 28.0 21.0 - 32.0 mmol/L    Anion Gap 7 0 - 18 mmol/L    BUN 10 9 - 23 mg/dL    Creatinine 0.69 0.55 - 1.02 mg/dL    Calcium, Total 9.5 8.7 - 10.4 mg/dL    Calculated Osmolality 281 275 - 295 mOsm/kg    eGFR-Cr 104 >=60 mL/min/1.73m2   Magnesium    Collection Time: 24  5:14 AM   Result Value Ref Range    Magnesium 2.0 1.6 - 2.6 mg/dL       Assessment/Plan:  54-year-old female here with moyamoya  She is doing well  Okay to make every 4 hours neurochecks  Will keep in the unit 1 more day  Discussed  with neurocritical care      Faustino Hopkins MD   [facility]  9/21/2024  10:16 AM  Dictated but not proofread

## 2024-09-21 NOTE — PROGRESS NOTES
[facility] Hospitalist Progress Note     CC: Hospital Follow up    PCP: Mildred Steward MD   Assessment/Plan:     Active Problems:    Cerebrovascular accident (HCC)    Moyamoya    Moyamoya disease    Aphasia    Facial droop    Hypokalemia    Anemia    Atonic constipation      Patient is a 54 year old female with PMH sig for ADHD who presented for elective craniotomy for moyamoya disease.  She is status post EDAS.    Recent CVA  Moyamoya disease  Status post EDAS, POD 6  - Neurochecks q4 now   - Pain management per neurosurgery  - PT/OT/speech evaluation-stable  - Weaned off brandon gtt this morning   -Keppra 500 mgTwice dailyx 7 days postsurgery  -Neurosurgery and neurointensivist following    R sided facial droop-improved  R sided weakness- improved  - frequent neurochecks,   - adequate BP control  - Speech eval-diet advanced    Bradycardia  - noted on tele, patient asymptomatic  - pt has \"erratic HR\" per family, likely high in the setting of adderall  - obtain EKG-reviewed, no blocks noted  - telemetry    ADHD  - Recommend resuming home Adderall, might assist with improved blood pressure and heart rate    Prophylaxis:   DVT with scd, heparin subcu  Lines/Monitors:     Dispo: CCU  Code status: Full    [facility]  [facility]- Hospitalist      Subjective:     No acute overnight events. Patient seen at bedside. States she is very tired with headache. Plan to remain in neuro ICU today.     OBJECTIVE:    Blood pressure 145/77, pulse 73, temperature 97.2 °F (36.2 °C), temperature source Temporal, resp. rate 13, weight 125 lb 7.1 oz (56.9 kg), SpO2 96%, not currently breastfeeding.    Temp:  [97.2 °F (36.2 °C)-97.8 °F (36.6 °C)] 97.2 °F (36.2 °C)  Pulse:  [] 73  Resp:  [9-21] 13  BP: (114-149)/() 145/77  SpO2:  [93 %-98 %] 96 %      Intake/Output:    Intake/Output Summary (Last 24 hours) at 9/21/2024 1319  Last data filed at 9/21/2024 1200  Gross per 24 hour   Intake 910 ml   Output 1100 ml   Net -190 ml        Last 3 Weights   09/21/24 0600 125 lb 7.1 oz (56.9 kg)   09/18/24 0800 126 lb 5.2 oz (57.3 kg)   09/15/24 0400 133 lb 2.5 oz (60.4 kg)   09/14/24 0444 132 lb 11.5 oz (60.2 kg)   09/12/24 1944 134 lb 14.7 oz (61.2 kg)   09/12/24 1926 134 lb 14.7 oz (61.2 kg)   09/04/24 0600 134 lb 3.2 oz (60.9 kg)   08/30/24 0928 132 lb (59.9 kg)   08/28/24 0213 133 lb 9.6 oz (60.6 kg)   08/28/24 0150 133 lb 8 oz (60.6 kg)   08/27/24 1840 132 lb (59.9 kg)       Exam   Gen: No acute distress, alert and oriented x3, no focal neurologic deficits, tired  Heent: Surgical staples intact, clean and dry, drain has been removed   pulm: Lungs clear, normal respiratory effort  CV: Heart with regular rate and rhythm, no peripheral edema  Abd: Abdomen soft, nontender, nondistended, bowel sounds present  MSK: Full range of motion in extremities   Skin: no rashes or lesions  Neuro: AO*3, right-sided facial droop, otherwise, neuro exam is grossly intact, delayed speech   Psyc: Depressed mood      Data Review:       Labs:     Recent Labs   Lab 09/17/24  0412 09/18/24  0438 09/19/24  0528 09/20/24  0431 09/21/24  0514   RBC 3.46*   < > 3.83 3.96 3.65*   HGB 10.7*   < > 11.6* 11.9* 11.1*   HCT 29.3*   < > 33.2* 34.0* 31.5*   MCV 84.7   < > 86.7 85.9 86.3   MCH 30.9   < > 30.3 30.1 30.4   MCHC 36.5   < > 34.9 35.0 35.2   RDW 11.9   < > 12.2 12.2 12.3   NEPRELIM 3.32  --  2.79 3.07  --    WBC 6.2   < > 4.3 4.5 4.7   .0   < > 271.0 262.0 209.0    < > = values in this interval not displayed.         Recent Labs   Lab 09/19/24  0528 09/20/24  0431 09/21/24  0514   GLU 94 93 90   BUN 7* 8* 10   CREATSERUM 0.65 0.69 0.69   EGFRCR 105 104 104   CA 9.6 10.0 9.5    137 136   K 3.9 3.7 3.8  3.8    98 101   CO2 28.0 29.0 28.0       No results for input(s): \"ALT\", \"AST\", \"ALB\", \"AMYLASE\", \"LIPASE\", \"LDH\" in the last 168 hours.    Invalid input(s): \"ALPHOS\", \"TBIL\", \"DBIL\", \"TPROT\"        Imaging:  No results found.      Meds:       melatonin  5 mg Oral Nightly    sennosides  8.6 mg Oral Daily    multivitamin with minerals  1 tablet Oral Daily    midodrine  10 mg Oral TID    atorvastatin  40 mg Oral Nightly    heparin  5,000 Units Subcutaneous 2 times per day    aspirin  325 mg Oral Daily    vortioxetine  10 mg Oral Nightly    [Transfer Hold] progesterone  100 mg Oral Nightly           acetaminophen    magnesium hydroxide    ondansetron    HYDROcodone-acetaminophen **OR** HYDROcodone-acetaminophen    morphINE    [Transfer Hold] bisacodyl    [Transfer Hold] fleet enema

## 2024-09-21 NOTE — PROGRESS NOTES
[facility]   NEUROCRITICAL CARE   PROGRESS NOTE    Admission date: 9/12/2024  Reason for Consult: Post op EDAS  Chief Complaint: Recent stroke  ________________________________________________________________    SUBJECTIVE     24 Hour Significant Events: Exam stable off of brandon, emesis this morning.    OBJECTIVE   VITAL SIGNS:   Temp:  [97.5 °F (36.4 °C)-97.9 °F (36.6 °C)] 97.5 °F (36.4 °C)  Pulse:  [] 115  Resp:  [10-21] 16  BP: (114-149)/() 149/102  SpO2:  [93 %-99 %] 96 %    INTAKE/OUTPUT:  Intake/Output Summary (Last 24 hours) at 9/21/2024 0917  Last data filed at 9/21/2024 0600  Gross per 24 hour   Intake 730 ml   Output 1300 ml   Net -570 ml     PHYSICAL EXAM:  GENERAL APPEARANCE: Patient resting comfortably in bed, NAD  HEENT: Normocephalic, atraumatic.   LUNGS: Normal respiratory rate and effort   HEART: Regular rate and rhythm   ABDOMEN: Soft. No tenderness, guarding, or rebound.     NEUROLOGIC:    Mental Status:  A&O x 4, Follows simple commands, slow mildly dysarthric speech, word output improving, able to name and repeat  Cranial nerves: PERRL.  Visual fields full.  EOMI.  R droop improving, more movement in face now.  Hearing grossly intact. Tongue midline   Motor: Drift:  RUE pronation; Motor exam is 5 out of 5 in all extremities bilaterally except 5-/5 in RUE   Sensation: Intact to light touch bilaterally  Cerebellar: Normal Finger-To-Nose test    LABORATORY DATA:  Last 24 hour labs were reviewed in detail.  Recent Labs   Lab 09/19/24 0528 09/20/24  0431 09/21/24  0514    137 136   K 3.9 3.7 3.8  3.8    98 101   CO2 28.0 29.0 28.0   GLU 94 93 90   BUN 7* 8* 10   CREATSERUM 0.65 0.69 0.69     Recent Labs   Lab 09/19/24  0528 09/20/24  0431 09/21/24  0514   WBC 4.3 4.5 4.7   HGB 11.6* 11.9* 11.1*   .0 262.0 209.0     No results for input(s): \"TOTALPROTEIN\", \"ALBUMIN\", \"ALT\", \"AST\" in the last 168 hours.    Invalid input(s): \"TOTALBILIRUBIN\",  \"ALKPHOSPHATE\"    Recent Labs   Lab 09/15/24  0406 09/16/24  0431 09/21/24  0514   MG 2.1 1.9 2.0       Scheduled Meds:   melatonin  5 mg Oral Nightly    sennosides  8.6 mg Oral Daily    multivitamin with minerals  1 tablet Oral Daily    midodrine  10 mg Oral TID    atorvastatin  40 mg Oral Nightly    heparin  5,000 Units Subcutaneous 2 times per day    aspirin  325 mg Oral Daily    vortioxetine  10 mg Oral Nightly    [Transfer Hold] progesterone  100 mg Oral Nightly     Continuous Infusions:    PRN Meds:  acetaminophen    magnesium hydroxide    ondansetron    HYDROcodone-acetaminophen **OR** HYDROcodone-acetaminophen    morphINE    [Transfer Hold] bisacodyl    [Transfer Hold] fleet enema    Radiology:    No results found.  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           - PT/OT evals         - Melatonin added nightly to help with sleep.       Cardiovascular:         - SBP goal as noted above          - Hold home medications     Pulmonary: - Saturating well on RA, encourage IS      Renal:         - Monitor I&Os           -Daily BMP      Gastrointestinal:  Nutrition: - Gen diet     Atonic constipation  - Bowel regimen scheduled 9/17, had BM later in evening, continue current regimen      Infectious Disease: - No  leukocytosis, afebrile, continue to monitor      Heme/Onc   Anemia - Hb goal > 7, daily CBC     Endocrine: - Accuchecks q6 with SSI prn            Checklist         - Lines: PIVs         - DVT Prophylaxis: SCDs, HSQ         - Diet: ADAT         - IVF: -          - Electrolytes: Replete per protocol         - Code Status: FULL     Dispo: CNICU pending nsg clearance       Greater than 35 minutes of critical care time (exclusive of billable procedures) was administered to manage and/or prevent neurologic instability. This involved direct patient intervention, complex decision making, and/or extensive discussions with the patient, family, and clinical staff.    Dustin Dillard MD  Neurocritical Care  [facility]

## 2024-09-22 NOTE — PLAN OF CARE
2000 assessment, Zeenat continues to have delayed responses, but seems more tired than prior night. Day nurse endorsed pt as being tired much of day. Physical elements of exam unchanged. Orientation questions also accurate, however pt was returned to bed and other nursing tasks performed before pt able to state location and year. Emesis x1, Additional anti-emetic ordered and given. Eugenia RUIZN aware.    0000 assessment, pt again slower than usual to answer place, however year and month were given as series of nonsensical numbers. Again, remainder of physical assessment unchanged from prior. Further, pt said \"bathroom\" and was able to rise from bed and transfer to commode, void, and return to bed with standby assist. She asked for water and drank without difficulty. Neuro APN notified, MAR and vitals reviewed; saw pt, and spoke with neuro critical care MD and neurosurgery MD. No additional orders at this time.    0400 assessment was able to verify orientation answers through choices given and pt nodding appropriately to questions about place, year, and month. However, pt presenting with expressive aphasia as she did not appear able to speak the correct answer. Neuro APN updated.

## 2024-09-22 NOTE — PROGRESS NOTES
[facility]   NEUROCRITICAL CARE   PROGRESS NOTE    Admission date: 9/12/2024  Reason for Consult: Post op EDAS  Chief Complaint: Recent stroke  ________________________________________________________________    SUBJECTIVE     24 Hour Significant Events: Emesis and overnight.  Lethargic overnight with decreased responses to orientation questions.  Blood pressure stable during this time. More awake this AM but still with decreases responses, stops half way through sentences.  feels she's been more drowsy the last few days as well.     OBJECTIVE   VITAL SIGNS:   Temp:  [96.8 °F (36 °C)-98.8 °F (37.1 °C)] 98.8 °F (37.1 °C)  Pulse:  [62-91] 74  Resp:  [9-21] 21  BP: (117-151)/(64-95) 138/83  SpO2:  [93 %-98 %] 97 %    INTAKE/OUTPUT:  Intake/Output Summary (Last 24 hours) at 9/22/2024 0933  Last data filed at 9/22/2024 0735  Gross per 24 hour   Intake 300 ml   Output 1050 ml   Net -750 ml     PHYSICAL EXAM:  GENERAL APPEARANCE: Patient resting comfortably in bed, NAD  HEENT: Normocephalic, atraumatic.   LUNGS: Normal respiratory rate and effort   HEART: Regular rate and rhythm   ABDOMEN: Soft. No tenderness, guarding, or rebound.     NEUROLOGIC:    Mental Status:  A&O x self, place, nodding head appropriately to person as well, Follows simple commands, slow mildly dysarthric speech, word output worse this AM, trouble naming and able to repeat half the sentence    Cranial nerves: PERRL.  Visual fields full.  EOMI.  R droop, more movement in face now.  Hearing grossly intact. Tongue midline   Motor: Drift:  RUE pronation; Motor exam is 5 out of 5 in all extremities   Sensation: Intact to light touch bilaterally  Cerebellar: Normal Finger-To-Nose test    LABORATORY DATA:  Last 24 hour labs were reviewed in detail.  Recent Labs   Lab 09/20/24  0431 09/21/24  0514 09/22/24  0445    136 135*   K 3.7 3.8  3.8 3.7  3.7   CL 98 101 99   CO2 29.0 28.0 23.0   GLU 93 90 81   BUN 8* 10 10    CREATSERUM 0.69 0.69 0.66     Recent Labs   Lab 09/20/24  0431 09/21/24  0514 09/22/24  0445   WBC 4.5 4.7 5.0   HGB 11.9* 11.1* 11.3*   .0 209.0 246.0     No results for input(s): \"TOTALPROTEIN\", \"ALBUMIN\", \"ALT\", \"AST\" in the last 168 hours.    Invalid input(s): \"TOTALBILIRUBIN\", \"ALKPHOSPHATE\"    Recent Labs   Lab 09/16/24  0431 09/21/24  0514 09/22/24  0445   MG 1.9 2.0 2.0       Scheduled Meds:   sodium chloride  1,000 mL Intravenous Once    [START ON 9/23/2024] aspirin  325 mg Oral Daily    melatonin  5 mg Oral Nightly    sennosides  8.6 mg Oral Daily    multivitamin with minerals  1 tablet Oral Daily    midodrine  10 mg Oral TID    atorvastatin  40 mg Oral Nightly    heparin  5,000 Units Subcutaneous 2 times per day    vortioxetine  10 mg Oral Nightly    [Transfer Hold] progesterone  100 mg Oral Nightly     Continuous Infusions:    PRN Meds:  metoclopramide    acetaminophen    magnesium hydroxide    ondansetron    HYDROcodone-acetaminophen **OR** HYDROcodone-acetaminophen    morphINE    [Transfer Hold] bisacodyl    [Transfer Hold] fleet enema    Radiology:    No results found.  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          - Adding amantadine as well for neurostimulant properties      Cardiovascular:         - SBP goal as noted above          - Hold home medications     Pulmonary: - Saturating well on RA, encourage IS      Renal:   Mild hyponatremia        - Monitor I&Os     -Volume down, getting IV fluid bolus this morning.         -Daily BMP      Gastrointestinal:  Nutrition: - Gen diet     Atonic constipation  - Bowel regimen scheduled 9/17, had BM later in evening, continue current regimen      Infectious Disease: - No leukocytosis, afebrile, continue to monitor      Heme/Onc   Anemia - Hb goal > 7, daily CBC     Endocrine: - Accuchecks q6 with SSI prn            Checklist         - Lines: PIVs         - DVT Prophylaxis: SCDs, HSQ         - Diet: ADAT         - IVF: -          - Electrolytes: Replete per protocol         - Code Status: FULL     Dispo: CNICU pending nsg clearance       Greater than 32 minutes of critical care time (exclusive of billable procedures) was administered to manage and/or prevent neurologic instability. This involved direct patient intervention, complex decision making, and/or extensive discussions with the patient, family, and clinical staff.    Dustin Dillard MD  Neurocritical Care  [facility]

## 2024-09-22 NOTE — PLAN OF CARE
Assumed care this morning at shift   Pt drowsy, but awakens easily and has been more alert/interactive today per  at bedside.    Neuro assessments unchanged.  Pt with persistent expressive aphagia, otherwise neuro intact.    Appetite improving, pt denies nausea or need for antiemetic today.  Able to eat 50% of breakfast tray.    PRN norco given for c/o mild headache this afternoon.   Down for f/u MRI brain.    Transfer orders in place to CTU7.

## 2024-09-22 NOTE — PROGRESS NOTES
[facility] Hospitalist Progress Note     CC: Hospital Follow up    PCP: Mildred Steward MD   Assessment/Plan:     Active Problems:    Cerebrovascular accident (HCC)    Moyamoya    Moyamoya disease    Aphasia    Facial droop    Hypokalemia    Anemia    Atonic constipation    Hyponatremia      Patient is a 54 year old female with PMH sig for ADHD who presented for elective craniotomy for moyamoya disease.  She is status post EDAS.    Recent CVA  Moyamoya disease  Status post EDAS  - Neurochecks per protocol   - Pain management as needed  - PT/OT/speech evaluation-stable  - Keppra 500 mgTwice dailyx 7 days postsurgery  - Neurosurgery and neurointensivist following. Plan to transfer to floor today following MRI brain.      R sided facial droop-improved  R sided weakness- improved  - frequent neurochecks   - adequate BP control with goal normotensive.   - Speech eval-diet advanced    Bradycardia  - noted on tele, patient asymptomatic  - pt has \"erratic HR\" per family, likely high in the setting of adderall  - obtain EKG-reviewed, no blocks noted  - telemetry    ADHD  - Recommend resuming home Adderall, might assist with improved blood pressure and heart rate    Prophylaxis:   DVT with scd, heparin subcu  Lines/Monitors:     Dispo: CCU  Code status: Full    [facility] JasonDO  [facility]- Hospitalist      Subjective:     Lethargic overnight. Seen at bedside with  present. Minimal appetite and complaining of dull headache. Plan for MRI brain today.     OBJECTIVE:    Blood pressure 154/83, pulse 78, temperature 97.8 °F (36.6 °C), temperature source Temporal, resp. rate 13, weight 123 lb 3.8 oz (55.9 kg), SpO2 96%, not currently breastfeeding.    Temp:  [96.8 °F (36 °C)-98.8 °F (37.1 °C)] 97.8 °F (36.6 °C)  Pulse:  [62-91] 78  Resp:  [10-21] 13  BP: (117-154)/(64-95) 154/83  SpO2:  [93 %-98 %] 96 %      Intake/Output:    Intake/Output Summary (Last 24 hours) at 9/22/2024 1048  Last data filed at 9/22/2024 0800  Gross  per 24 hour   Intake 400 ml   Output 1050 ml   Net -650 ml       Last 3 Weights   09/22/24 0400 123 lb 3.8 oz (55.9 kg)   09/21/24 0600 125 lb 7.1 oz (56.9 kg)   09/18/24 0800 126 lb 5.2 oz (57.3 kg)   09/15/24 0400 133 lb 2.5 oz (60.4 kg)   09/14/24 0444 132 lb 11.5 oz (60.2 kg)   09/12/24 1944 134 lb 14.7 oz (61.2 kg)   09/12/24 1926 134 lb 14.7 oz (61.2 kg)   09/22/24 1040 123 lb 3.8 oz (55.9 kg)   09/04/24 0600 134 lb 3.2 oz (60.9 kg)   08/30/24 0928 132 lb (59.9 kg)       Exam   Gen: No acute distress, alert and oriented x3, no focal neurologic deficits, tired  Heent: Surgical staples intact, clean and dry, drain has been removed   pulm: Lungs clear, normal respiratory effort  CV: Heart with regular rate and rhythm, no peripheral edema  Abd: Abdomen soft, nontender, nondistended, bowel sounds present  MSK: Full range of motion in extremities   Skin: no rashes or lesions  Neuro: AO*3, right-sided facial droop, otherwise, neuro exam is grossly intact, delayed speech   Psyc: Depressed mood      Data Review:       Labs:     Recent Labs   Lab 09/17/24  0412 09/18/24  0438 09/19/24  0528 09/20/24  0431 09/21/24  0514 09/22/24  0445   RBC 3.46*   < > 3.83 3.96 3.65* 3.76*   HGB 10.7*   < > 11.6* 11.9* 11.1* 11.3*   HCT 29.3*   < > 33.2* 34.0* 31.5* 32.2*   MCV 84.7   < > 86.7 85.9 86.3 85.6   MCH 30.9   < > 30.3 30.1 30.4 30.1   MCHC 36.5   < > 34.9 35.0 35.2 35.1   RDW 11.9   < > 12.2 12.2 12.3 12.2   NEPRELIM 3.32  --  2.79 3.07  --   --    WBC 6.2   < > 4.3 4.5 4.7 5.0   .0   < > 271.0 262.0 209.0 246.0    < > = values in this interval not displayed.         Recent Labs   Lab 09/20/24  0431 09/21/24  0514 09/22/24  0445   GLU 93 90 81   BUN 8* 10 10   CREATSERUM 0.69 0.69 0.66   EGFRCR 104 104 105   CA 10.0 9.5 9.5    136 135*   K 3.7 3.8  3.8 3.7  3.7   CL 98 101 99   CO2 29.0 28.0 23.0       No results for input(s): \"ALT\", \"AST\", \"ALB\", \"AMYLASE\", \"LIPASE\", \"LDH\" in the last 168 hours.    Invalid  input(s): \"ALPHOS\", \"TBIL\", \"DBIL\", \"TPROT\"        Imaging:  No results found.      Meds:      sodium chloride  1,000 mL Intravenous Once    [START ON 9/23/2024] aspirin  325 mg Oral Daily    amantadine  100 mg Oral BID    melatonin  5 mg Oral Nightly    sennosides  8.6 mg Oral Daily    multivitamin with minerals  1 tablet Oral Daily    midodrine  10 mg Oral TID    atorvastatin  40 mg Oral Nightly    heparin  5,000 Units Subcutaneous 2 times per day    vortioxetine  10 mg Oral Nightly    [Transfer Hold] progesterone  100 mg Oral Nightly           metoclopramide    acetaminophen    magnesium hydroxide    ondansetron    HYDROcodone-acetaminophen **OR** HYDROcodone-acetaminophen    morphINE    [Transfer Hold] bisacodyl    [Transfer Hold] fleet enema

## 2024-09-22 NOTE — PROGRESS NOTES
[facility]   Neurosurgery Progress Note    Isabella Kevin Patient Status:  Inpatient    1970 MRN [id number]   [facility] 6NE-A Attending Cornelio Guzman MD   Hosp Day # 10 PCP Mildred Steward MD     Chief Complaint:  S/p EDAS for Moyamoya on 2024    Subjective:  Pt with reported difficulty answering orientation questions overnight. Nauseous, difficulty tolerating PO.    Objective/Physical Exam:    Vital Signs:  Blood pressure 144/86, pulse 91, temperature 98.8 °F (37.1 °C), temperature source Temporal, resp. rate 20, weight 123 lb 3.8 oz (55.9 kg), SpO2 97%, not currently breastfeeding.    Respiratory:  Respirations nonlabored    CV:  NSR on tele    General: NAD, Speech Fluent, Mood Appropriate    Neurologic: This patient is awake and alert, answers all questions appropriately. MYLES x 4 spontaneously to gravity.       Skin: Incision clean, intact and dry, no erythema, edema or drainage       Labs:  Lab Results   Component Value Date    WBC 5.0 2024    HGB 11.3 2024    HCT 32.2 2024    .0 2024    CREATSERUM 0.66 2024    BUN 10 2024     2024    K 3.7 2024    K 3.7 2024    CL 99 2024    CO2 23.0 2024    GLU 81 2024    CA 9.5 2024    MG 2.0 2024       Imaging:  No recent imaging to review     Assessment:  55 y/o female s/p left sided EDAS for symptomatic moyamoya disease on 2024.  BP remains stable with midodrine, off pressors.    Plan:  Ok to transfer to floor today  PT/OT/OOB  Continue ASA, midodrine, goal normotension per neurocritical care  Medical management per hospitalist  DVT prophylaxis: SQ heparin    Patient seen and assessed with Dr. Hopkins.  Plan of care discussed with the patient as well as Dr. Dillard.      Monika Hunt PA-C   [facility]  2024, 8:54 AM      A total of 15 minutes of visit time (exclusible of billable  procedures) was administered.  > 50 % of time spent counseling/coordinating care     Is this a shared or split note between Advanced Practice Provider and Physician? Yes  Patient seen examined with PA  Case discussed  She is doing okay  Okay for floor  Still having some nausea  Discussed with neurocritical care

## 2024-09-23 NOTE — PROGRESS NOTES
[facility]   NEUROCRITICAL CARE   PROGRESS NOTE    Admission date: 9/12/2024  Reason for Consult: Post op EDAS  Chief Complaint: Recent stroke  ________________________________________________________________    SUBJECTIVE     24 Hour Significant Events: Worsening expressive aphasia noted this AM while sitting up and SBP in low 100s, improved with lying flat and SBP in 160s    OBJECTIVE   VITAL SIGNS:   Temp:  [96.8 °F (36 °C)-98.2 °F (36.8 °C)] 96.8 °F (36 °C)  Pulse:  [62-89] 64  Resp:  [10-27] 12  BP: (107-186)/(62-94) 107/89  SpO2:  [90 %-100 %] 95 %    INTAKE/OUTPUT:  Intake/Output Summary (Last 24 hours) at 9/23/2024 0906  Last data filed at 9/23/2024 0900  Gross per 24 hour   Intake 1400 ml   Output 2375 ml   Net -975 ml     PHYSICAL EXAM:  GENERAL APPEARANCE: Patient resting comfortably in bed, NAD  HEENT: Normocephalic, atraumatic.   LUNGS: Normal respiratory rate and effort   HEART: Regular rate and rhythm   ABDOMEN: Soft. No tenderness, guarding, or rebound.     NEUROLOGIC:    Mental Status:  A&O x self, place, nodding head appropriately to person as well, Follows simple commands, slow mildly dysarthric speech, naming 3/3 and able to repeat half the sentence    Cranial nerves: PERRL.  Visual fields full.  EOMI.  R droop, more movement in face now.  Hearing grossly intact. Tongue midline   Motor: Drift:  RUE pronation; Motor exam is 5 out of 5 in all extremities   Sensation: Intact to light touch bilaterally  Cerebellar: Normal Finger-To-Nose test    LABORATORY DATA:  Last 24 hour labs were reviewed in detail.  Recent Labs   Lab 09/21/24  0514 09/22/24  0445 09/23/24  0508    135* 135*   K 3.8  3.8 3.7  3.7 4.0  4.0    99 102   CO2 28.0 23.0 25.0   GLU 90 81 87   BUN 10 10 10   CREATSERUM 0.69 0.66 0.65     Recent Labs   Lab 09/21/24  0514 09/22/24  0445 09/23/24  0508   WBC 4.7 5.0 5.4   HGB 11.1* 11.3* 10.8*   .0 246.0 229.0     No results for input(s):  \"TOTALPROTEIN\", \"ALBUMIN\", \"ALT\", \"AST\" in the last 168 hours.    Invalid input(s): \"TOTALBILIRUBIN\", \"ALKPHOSPHATE\"    Recent Labs   Lab 09/21/24  0514 09/22/24  0445   MG 2.0 2.0       Scheduled Meds:   aspirin  325 mg Oral Daily    amantadine  100 mg Oral BID    melatonin  5 mg Oral Nightly    sennosides  8.6 mg Oral Daily    multivitamin with minerals  1 tablet Oral Daily    midodrine  10 mg Oral TID    atorvastatin  40 mg Oral Nightly    heparin  5,000 Units Subcutaneous 2 times per day    vortioxetine  10 mg Oral Nightly    [Transfer Hold] progesterone  100 mg Oral Nightly     Continuous Infusions:   phenylephrine       PRN Meds:  metoclopramide    acetaminophen    magnesium hydroxide    ondansetron    HYDROcodone-acetaminophen **OR** HYDROcodone-acetaminophen    morphINE    [Transfer Hold] bisacodyl    [Transfer Hold] fleet enema    Radiology:    MRI BRAIN (CPT=70551)    Addendum Date: 9/22/2024    ADDENDUM:  This critical result was discussed with Nurse Farrah at 1627 hours on 9/22/2024. Read back was performed.  Dictated by (CST): Jerod Jean MD on 9/22/2024 at 4:26 PM     Finalized by (CST): Jerod Jean MD on 9/22/2024 at 4:27 PM             Result Date: 9/22/2024  CONCLUSION:  New acute infarction in the mid left MCA territory is noted.  This occupies approximately 1/3 of the left MCA territory.  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 (9/3-9/5)  S/P craniotomy for left encephaloduroarteriosynangiosis         - Post op CT w/ expected post op  changes          - C/b post op worsening of aphasia and droop, repeat CT stable          - EEG 9/14 with no focal, lateralized or generalized epileptiform discharges or seizures noted, ok to dc         - S/p Keppra 500 mg BID x7 days          - Continue asa and statin    - Rpt mri brain 9/22 with new AIS in L mca territory, will d/w Neurosurg possible transition to dapt for secondary stroke prevention         - SBP goal now 150-180 given worsening speech at lower SBP, continue midodrine tid and vasopressors prn         - PT/OT evals         - Melatonin nightly to help with sleep.          - Added amantadine as well for neurostimulant properties      Cardiovascular:         - SBP goal as noted above          - Hold home medications     Pulmonary: - Saturating well on RA, encourage IS      Renal:   Mild hyponatremia        - Monitor I&Os     -Volume down, start IVFs         -Daily BMP      Gastrointestinal:  Nutrition: - Gen diet     Atonic constipation  - Bowel regimen scheduled 9/17, had BM later in evening, continue current regimen      Infectious Disease: - No leukocytosis, afebrile, continue to monitor      Heme/Onc   Anemia - Hb goal > 7, daily CBC     Endocrine: - Accuchecks q6 with SSI prn            Checklist         - Lines: PIVs         - DVT Prophylaxis: SCDs, HSQ         - Diet: ADAT         - IVF: -          - Electrolytes: Replete per protocol         - Code Status: FULL     Dispo: [facility]ICU      Greater than 80 minutes of critical care time (exclusive of billable procedures) was administered to manage and/or prevent neurologic instability. This involved direct patient intervention, complex decision making, and/or extensive discussions with the patient, family, and clinical staff.    Jackson Stinson MD [facility]  Neurocritical Care  [facility]

## 2024-09-23 NOTE — PROGRESS NOTES
[facility]  Neurosurgery Progress Note    Isabella Kevin Patient Status:  Inpatient    1970 MRN [id number]   Location [facility] 6NE-A Attending Cornelio Guzman MD   Hosp Day # 11 PCP Mildred Steward MD     Chief Complaint:  POD #10 L EDAS for moyamoya    Subjective:  Patient found to be lethargic overnight with decreased responses to orientation. BP remained stable off of pressors. Has also been more drowsy last few days. MRI brain obtained with concern for new L MCA infarct. Patient placed on EEG to evaluate for seizures.    Objective/Physical Exam:    Vital Signs:  Blood pressure 145/89, pulse 73, temperature 98.2 °F (36.8 °C), temperature source Temporal, resp. rate (!) 27, weight 125 lb 3.5 oz (56.8 kg), SpO2 97%, not currently breastfeeding.  Neurologic:   Appears tired on exam, sitting upright in chair.   Delayed speech with quiet voice. Not oriented to self this morning. Oriented to location and time   Follows commands briskly  PERRL, EOMi  Right facial droop  R  4+/5, R triceps 4+/5 ow full strength all other muscle groups of RUE   5/5 strength LUE and BLE     Incision clean dry and intact      Labs:  Recent Labs   Lab 24  0412 24  0438 24  0528 24  0431 24  0514 24  0445 24  0508   RBC 3.46*   < > 3.83 3.96 3.65* 3.76* 3.52*   HGB 10.7*   < > 11.6* 11.9* 11.1* 11.3* 10.8*   HCT 29.3*   < > 33.2* 34.0* 31.5* 32.2* 30.6*   MCV 84.7   < > 86.7 85.9 86.3 85.6 86.9   MCH 30.9   < > 30.3 30.1 30.4 30.1 30.7   MCHC 36.5   < > 34.9 35.0 35.2 35.1 35.3   RDW 11.9   < > 12.2 12.2 12.3 12.2 12.4   NEPRELIM 3.32  --  2.79 3.07  --   --   --    WBC 6.2   < > 4.3 4.5 4.7 5.0 5.4   .0   < > 271.0 262.0 209.0 246.0 229.0    < > = values in this interval not displayed.       Recent Labs   Lab 24  0514 24  0445 24  0508   GLU 90 81 87   BUN 10 10 10   CREATSERUM 0.69 0.66 0.65   EGFRCR 104 105 105   CA 9.5 9.5 9.2     135* 135*   K 3.8  3.8 3.7  3.7 4.0  4.0    99 102   CO2 28.0 23.0 25.0     Assessment:  54-year-old  s/p left-sided EDAS for symptomatic moyamoya disease    Plan:  -Optimization per NCC  -Q2' neurochecks  -Pain control  -SCDs and SQH for DVT ppx  -In setting of neurological change with difficulty with orientation, recommend HOB flat and SBP goal 150-180. Use Evelio gtt as needed to maintain goal, reordered. Discussed plan with  and patient at bedside who are frustrated with patient's clinical course and that patient is not able to go home. Dr. Guzman to talk with family further this afternoon  -Continue daily ASA 325mg  -ADAT  -Maintain euvolemia  -EEG per NCC     Patient discussed with Dr. Thomas Rosenberg PA-C  [facility]  9/23/2024 11:47 AM     Is this a shared or split note between Advanced Practice Provider and Physician? Yes

## 2024-09-23 NOTE — PLAN OF CARE
Neuro checks q 4 hrs. No new deficits noted. cEEG in place. Seizure precautions in place. On RA. VSS. Reglan and zofran given for nausea. No emesis. Up SBA to bathroom. Voiding without difficulty. Strict I&O. Norco given x 1 for HA with relief. SCD's on. No family present during shift

## 2024-09-23 NOTE — PAYOR COMM NOTE
--------------  CONTINUED STAY REVIEW    Payor: [employer]  Subscriber #:  [id number]  Authorization Number: [id number]    Admit date: 9/12/24  Admit time:  6:09 PM    REVIEW DOCUMENTATION:        9/22 IM    Active Problems:    Cerebrovascular accident (HCC)    Moyamoya    Moyamoya disease    Aphasia    Facial droop    Hypokalemia    Anemia    Atonic constipation    Hyponatremia        Patient is a 54 year old female with PMH sig for ADHD who presented for elective craniotomy for moyamoya disease.  She is status post EDAS.     Recent CVA  Moyamoya disease  Status post EDAS  - Neurochecks per protocol   - Pain management as needed  - PT/OT/speech evaluation-stable  - Keppra 500 mgTwice dailyx 7 days postsurgery  - Neurosurgery and neurointensivist following. Plan to transfer to floor today following MRI brain.       R sided facial droop-improved  R sided weakness- improved  - frequent neurochecks   - adequate BP control with goal normotensive.   - Speech eval-diet advanced     Bradycardia  - noted on tele, patient asymptomatic  - pt has \"erratic HR\" per family, likely high in the setting of adderall  - obtain EKG-reviewed, no blocks noted  - telemetry     ADHD  - Recommend resuming home Adderall, might assist with improved blood pressure and heart rate     Prophylaxis:   DVT with scd, heparin subcu  Lines/Monitors:      Dispo: CCU  Code status: Full     Marekteto Gomez DO  [facility]- Hospitalist       Subjective:      Lethargic overnight. Seen at bedside with  present. Minimal appetite and complaining of dull headache. Plan for MRI brain today.      OBJECTIVE:     Blood pressure 154/83, pulse 78, temperature 97.8 °F (36.6 °C), temperature source Temporal, resp. rate 13, weight 123 lb 3.8 oz (55.9 kg), SpO2 96%, not currently breastfeeding.     Temp:  [96.8 °F (36 °C)-98.8 °F (37.1 °C)] 97.8 °F (36.6 °C)  Pulse:  [62-91] 78  Resp:  [10-21] 13  BP: (117-154)/(64-95) 154/83  SpO2:  [93 %-98 %] 96 %         Intake/Output:     Intake/Output Summary (Last 24 hours) at 9/22/2024 1048  Last data filed at 9/22/2024 0800      Gross per 24 hour   Intake 400 ml   Output 1050 ml   Net -650 ml              Last 3 Weights   09/22/24 0400 123 lb 3.8 oz (55.9 kg)   09/21/24 0600 125 lb 7.1 oz (56.9 kg)   09/18/24 0800 126 lb 5.2 oz (57.3 kg)   09/15/24 0400 133 lb 2.5 oz (60.4 kg)   09/14/24 0444 132 lb 11.5 oz (60.2 kg)   09/12/24 1944 134 lb 14.7 oz (61.2 kg)   09/12/24 1926 134 lb 14.7 oz (61.2 kg)   09/22/24 1040 123 lb 3.8 oz (55.9 kg)   09/04/24 0600 134 lb 3.2 oz (60.9 kg)   08/30/24 0928 132 lb (59.9 kg)         Exam   Gen: No acute distress, alert and oriented x3, no focal neurologic deficits, tired  Heent: Surgical staples intact, clean and dry, drain has been removed   pulm: Lungs clear, normal respiratory effort  CV: Heart with regular rate and rhythm, no peripheral edema  Abd: Abdomen soft, nontender, nondistended, bowel sounds present  MSK: Full range of motion in extremities   Skin: no rashes or lesions  Neuro: AO*3, right-sided facial droop, otherwise, neuro exam is grossly intact, delayed speech   Psyc: Depressed mood        Data Review:       Labs:               Recent Labs   Lab 09/17/24  0412 09/18/24  0438 09/19/24  0528 09/20/24  0431 09/21/24  0514 09/22/24  0445   RBC 3.46*   < > 3.83 3.96 3.65* 3.76*   HGB 10.7*   < > 11.6* 11.9* 11.1* 11.3*   HCT 29.3*   < > 33.2* 34.0* 31.5* 32.2*   MCV 84.7   < > 86.7 85.9 86.3 85.6   MCH 30.9   < > 30.3 30.1 30.4 30.1   MCHC 36.5   < > 34.9 35.0 35.2 35.1   RDW 11.9   < > 12.2 12.2 12.3 12.2   NEPRELIM 3.32  --  2.79 3.07  --   --    WBC 6.2   < > 4.3 4.5 4.7 5.0   .0   < > 271.0 262.0 209.0 246.0    < > = values in this interval not displayed.                  Recent Labs   Lab 09/20/24  0431 09/21/24  0514 09/22/24  0445   GLU 93 90 81   BUN 8* 10 10   CREATSERUM 0.69 0.69 0.66   EGFRCR 104 104 105   CA 10.0 9.5 9.5    136 135*   K 3.7 3.8  3.8 3.7   3.7   CL 98 101 99   CO2 29.0 28.0 23.0          Imaging:  No results found.        Meds:      Scheduled Medications    sodium chloride  1,000 mL Intravenous Once    [START ON 9/23/2024] aspirin  325 mg Oral Daily    amantadine  100 mg Oral BID    melatonin  5 mg Oral Nightly    sennosides  8.6 mg Oral Daily    multivitamin with minerals  1 tablet Oral Daily    midodrine  10 mg Oral TID    atorvastatin  40 mg Oral Nightly    heparin  5,000 Units Subcutaneous 2 times per day    vortioxetine  10 mg Oral Nightly    [Transfer Hold] progesterone  100 mg Oral Nightly         Medication Infusions              PRN Medications     metoclopramide    acetaminophen    magnesium hydroxide    ondansetron    HYDROcodone-acetaminophen **OR** HYDROcodone-acetaminophen    morphINE    [Transfer Hold] bisacodyl    [Transfer Hold] fleet enema          9/22 Neuro       Admission date: 9/12/2024  Reason for Consult: Post op EDAS  Chief Complaint: Recent stroke  ________________________________________________________________     SUBJECTIVE      24 Hour Significant Events: Emesis and overnight.  Lethargic overnight with decreased responses to orientation questions.  Blood pressure stable during this time. More awake this AM but still with decreases responses, stops half way through sentences.  feels she's been more drowsy the last few days as well.      OBJECTIVE   VITAL SIGNS:   Temp:  [96.8 °F (36 °C)-98.8 °F (37.1 °C)] 98.8 °F (37.1 °C)  Pulse:  [62-91] 74  Resp:  [9-21] 21  BP: (117-151)/(64-95) 138/83  SpO2:  [93 %-98 %] 97 %     INTAKE/OUTPUT:  Intake/Output Summary (Last 24 hours) at 9/22/2024 0900  Last data filed at 9/22/2024 0735      Gross per 24 hour   Intake 300 ml   Output 1050 ml   Net -750 ml      PHYSICAL EXAM:  GENERAL APPEARANCE: Patient resting comfortably in bed, NAD  HEENT: Normocephalic, atraumatic.   LUNGS: Normal respiratory rate and effort   HEART: Regular rate and rhythm   ABDOMEN: Soft. No tenderness,  guarding, or rebound.     NEUROLOGIC:    Mental Status:  A&O x self, place, nodding head appropriately to person as well, Follows simple commands, slow mildly dysarthric speech, word output worse this AM, trouble naming and able to repeat half the sentence    Cranial nerves: PERRL.  Visual fields full.  EOMI.  R droop, more movement in face now.  Hearing grossly intact. Tongue midline   Motor: Drift:  RUE pronation; Motor exam is 5 out of 5 in all extremities   Sensation: Intact to light touch bilaterally  Cerebellar: Normal Finger-To-Nose test     LABORATORY DATA:  Last 24 hour labs were reviewed in detail.        Recent Labs   Lab 09/20/24 0431 09/21/24  0514 09/22/24  0445    136 135*   K 3.7 3.8  3.8 3.7  3.7   CL 98 101 99   CO2 29.0 28.0 23.0   GLU 93 90 81   BUN 8* 10 10   CREATSERUM 0.69 0.69 0.66            Recent Labs   Lab 09/20/24 0431 09/21/24 0514 09/22/24  0445   WBC 4.5 4.7 5.0   HGB 11.9* 11.1* 11.3*   .0 209.0 246.0      No results for input(s): \"TOTALPROTEIN\", \"ALBUMIN\", \"ALT\", \"AST\" in the last 168 hours.     Invalid input(s): \"TOTALBILIRUBIN\", \"ALKPHOSPHATE\"           Recent Labs   Lab 09/16/24 0431 09/21/24 0514 09/22/24  0445   MG 1.9 2.0 2.0         Scheduled Meds:  Scheduled Medications    sodium chloride  1,000 mL Intravenous Once    [START ON 9/23/2024] aspirin  325 mg Oral Daily    melatonin  5 mg Oral Nightly    sennosides  8.6 mg Oral Daily    multivitamin with minerals  1 tablet Oral Daily    midodrine  10 mg Oral TID    atorvastatin  40 mg Oral Nightly    heparin  5,000 Units Subcutaneous 2 times per day    vortioxetine  10 mg Oral Nightly    [Transfer Hold] progesterone  100 mg Oral Nightly         Continuous Infusions:  Medication Infusions         PRN Meds:  PRN Medications     metoclopramide    acetaminophen    magnesium hydroxide    ondansetron    HYDROcodone-acetaminophen **OR** HYDROcodone-acetaminophen    morphINE    [Transfer Hold] bisacodyl    [Transfer  Hold] fleet enema        Radiology:    No results found.  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 (9/3-9/5)  S/P craniotomy for left encephaloduroarteriosynangiosis         - Post op CT w/ expected post op changes          - C/b post op worsening of aphasia and droop, repeat CT stable          - EEG 9/14 with no focal, lateralized or generalized epileptiform discharges or seizures noted          - S/p Keppra 500 mg BID x7 days             - Continue asa and statin                - MRI brain to eval for stroke in which case patient may benefit from the addition of plavix          - SBP goal now normotension, continue midodrine         - PT/OT evals         - Melatonin nightly to help with sleep.          - Adding amantadine as well for neurostimulant properties      Cardiovascular:         - SBP goal as noted above          - Hold home medications     Pulmonary: - Saturating well on RA, encourage IS      Renal:   Mild hyponatremia        - Monitor I&Os                 -Volume down, getting IV fluid bolus this morning.         -Daily BMP      Gastrointestinal:  Nutrition: - Gen diet      Atonic constipation  - Bowel regimen scheduled 9/17, had BM later in evening, continue current regimen      Infectious Disease: - No leukocytosis, afebrile, continue to monitor      Heme/Onc   Anemia - Hb goal > 7, daily CBC     Endocrine: - Accuchecks q6 with SSI prn            Checklist         - Lines: PIVs         - DVT Prophylaxis: SCDs, HSQ         - Diet: ADAT         - IVF: -          - Electrolytes: Replete per protocol         - Code Status: FULL               MEDICATIONS ADMINISTERED IN LAST 1 DAY:  acetaminophen (Tylenol) tab 650 mg        Date Action Dose Route User    9/23/2024 1143 Given 650 mg Oral Berna Wong RN          amantadine (Symmetrel) tab 100 mg       Date Action Dose Route User    9/23/2024 0902 Given 100 mg Oral Berna Wong RN    9/22/2024 2010 Given 100 mg Oral Alanna Sue RN          aspirin tab 325 mg       Date Action Dose Route User    9/23/2024 0803 Given 325 mg Oral Berna Wong RN          atorvastatin (Lipitor) tab 40 mg       Date Action Dose Route User    9/22/2024 2009 Given 40 mg Oral Alanna Sue RN          heparin (Porcine) 5000 UNIT/ML injection 5,000 Units       Date Action Dose Route User    9/23/2024 0803 Given 5,000 Units Subcutaneous (Left Lower Abdomen) Berna Wong RN    9/22/2024 2009 Given 5,000 Units Subcutaneous (Left Lower Abdomen) Alanna Sue RN          HYDROcodone-acetaminophen (Norco)  MG per tab 1 tablet       Date Action Dose Route User    9/23/2024 0053 Given 1 tablet Oral Alanna Sue RN    9/22/2024 1317 Given 1 tablet Oral Dora Espinosa RN          magnesium hydroxide (Milk of Magnesia) 400 MG/5ML oral suspension 30 mL       Date Action Dose Route User    9/23/2024 0803 Given 30 mL Oral Berna Wong RN          melatonin cap/tab 5 mg       Date Action Dose Route User    9/22/2024 2010 Given 5 mg Oral Alanna Sue RN          metoclopramide (Reglan) 5 mg/mL injection 10 mg       Date Action Dose Route User    9/23/2024 0753 Given 10 mg Intravenous Berna Wong RN    9/22/2024 2009 Given 10 mg Intravenous Alanna Sue RN          midodrine (ProAmatine) tab 10 mg       Date Action Dose Route User    9/23/2024 1125 Given 10 mg Oral Berna Wong RN    9/23/2024 0508 Given 10 mg Oral Alanna Sue RN    9/22/2024 1648 Given 10 mg Oral Rui Yan RN          ondansetron (Zofran) 4 MG/2ML injection 4 mg       Date Action Dose Route User    9/23/2024 0053 Given 4 mg Intravenous Alanna Sue RN    9/22/2024  1652 Given 4 mg Intravenous Rui Yan RN          sennosides (Senokot) tab 8.6 mg       Date Action Dose Route User    9/23/2024 0803 Given 8.6 mg Oral TinBerna hamilton RN          sodium chloride 0.9% infusion       Date Action Dose Route User    9/23/2024 1125 New Bag (none) Intravenous Berna Wong RN          multivitamin with minerals (Thera M Plus) tab 1 tablet       Date Action Dose Route User    9/23/2024 0902 Given 1 tablet Oral Berna Wong RN          vortioxetine (Trintellix) tab 10 mg       Date Action Dose Route User    9/22/2024 2009 Given 10 mg Oral Alanna Sue RN            potassium chloride (Klor-Con M20) tab 40 mEq  Dose: 40 mEq  Freq: Once Route: OR  Start: 09/22/24 0645 End: 09/22/24 0659   Admin Instructions:   Do not crush            0659 JW-Given              Or   HYDROcodone-acetaminophen (Norco)  MG per tab 2 tablet  Dose: 2 tablet  Freq: Every 4 hours PRN Route: OR  PRN Reason: moderate pain  Start: 09/13/24 1636                                (0525 SC)-Not Given [C]                                                                               1640 SZ-Given [C]                                                   sodium chloride 0.9 % IV bolus 1,000 mL  Dose: 1,000 mL  Freq: Once Route: IV  Last Dose: 1,000 mL (09/22/24 1019)  Start: 09/22/24 0945 End: 09/22/24 1219            1019 AM-New Bag            Vitals (last day)       Date/Time Temp Pulse Resp BP SpO2 Weight O2 Device O2 Flow Rate (L/min) Who    09/23/24 1200 -- 65 13 179/78 97 % -- None (Room air) -- AT    09/23/24 1140 -- 67 12 164/79 95 % -- -- -- AT    09/23/24 1130 -- 75 20 135/60 95 % -- -- -- AT    09/23/24 1100 96.6 °F (35.9 °C) 83 18 165/87 98 % -- -- -- AT    09/23/24 1030 -- 73 17 159/85 96 % -- -- -- AT    09/23/24 1000 -- 71 13 153/85 93 % -- -- -- AT    09/23/24 0900 -- 74 14 131/91 92 % -- -- -- AT    09/23/24 0830 -- 64 12 107/89 95 % -- -- -- AT    09/23/24 0800 96.8 °F (36 °C) 74 13  127/79 100 % -- None (Room air) -- AT    09/23/24 0700 -- 73 27 145/89 97 % 125 lb 3.5 oz (56.8 kg) -- -- MS    09/23/24 0600 -- 65 12 123/70 96 % -- -- -- MS    09/23/24 0500 -- 62 14 113/66 95 % -- -- -- MS    09/23/24 0400 98.2 °F (36.8 °C) 68 16 120/68 96 % -- -- -- MS    09/23/24 0300 -- 63 10 121/64 95 % -- -- -- MS    09/23/24 0200 -- 65 13 122/62 94 % -- -- -- MS    09/23/24 0100 -- 71 18 135/75 90 % -- -- -- MS    09/23/24 0000 97.7 °F (36.5 °C) 70 17 114/71 93 % -- None (Room air) -- MS    09/22/24 2300 -- 65 13 143/85 96 % -- -- -- MS    09/22/24 2200 -- 63 14 -- 96 % -- -- -- MS    09/22/24 2100 -- 89 14 156/93 99 % -- -- -- MS    09/22/24 2000 97.7 °F (36.5 °C) 70 13 140/84 97 % -- None (Room air) -- MS    09/22/24 1900 -- 68 17 172/81 96 % -- -- -- CH    09/22/24 1800 -- 67 13 186/86 97 % -- -- -- CH    09/22/24 1700 -- 64 14 -- 97 % -- -- -- CH    09/22/24 1600 97.5 °F (36.4 °C) 89 22 148/89 96 % -- None (Room air) -- CH    09/22/24 1400 -- 75 15 161/68 96 % -- -- -- AM    09/22/24 1300 -- 78 13 142/87 98 % -- -- -- AM    09/22/24 1200 97 °F (36.1 °C) 75 14 111/94 99 % -- None (Room air) -- AM    09/22/24 1100 -- 70 21 151/76 99 % -- -- -- AM    09/22/24 1000 -- 78 13 154/83 96 % -- -- -- AM    09/22/24 0900 -- 74 21 138/83 97 % -- -- -- AM    09/22/24 0800 97.8 °F (36.6 °C) 91 16 134/84 96 % -- None (Room air) -- AM    09/22/24 0735 -- 91 20 -- 97 % -- -- -- ER    09/22/24 0700 -- 88 15 144/86 98 % -- -- --     09/22/24 0600 -- 78 15 129/80 97 % -- -- --     09/22/24 0500 -- 82 19 130/64 95 % -- -- --     09/22/24 0400 98.8 °F (37.1 °C) 80 15 117/64 96 % 123 lb 3.8 oz (55.9 kg) None (Room air) --     09/22/24 0300 -- 76 12 136/70 97 % -- -- --     09/22/24 0200 -- 86 14 142/76 97 % -- -- --     09/22/24 0100 -- 75 13 137/81 97 % -- -- -- JW 09/22/24 0000 97 °F (36.1 °C) 80 19 146/83 95 % -- None (Room air) --

## 2024-09-23 NOTE — PLAN OF CARE
Received report this AM, Pt alert in chair, having increased expressive aphasia, trouble word finding but able to point or nod to answer appropriately to  questions. Neuro's every Hr, Bedrest, SBP goal now 150-180, Evelio drip if needed.      Problem: Patient/Family Goals  Goal: Patient/Family Long Term Goal  Description: Patient's Long Term Goal: Stay out of hospital    Interventions:  - Follow up with PCP after discharge  - Take medication as prescribed  - See additional Care Plan goals for specific interventions  Outcome: Progressing  Note: Discharge home as soon as able  Goal: Patient/Family Short Term Goal  Description: Patient's Short Term Goal: Feel better and go home    Interventions:   - Test/procedure ordered  - Monitor labs  - See additional Care Plan goals for specific interventions  Outcome: Progressing  Note: Maintain BP goals with sitting and standing, improve neurological status       Problem: NEUROLOGICAL - ADULT  Goal: Achieves stable or improved neurological status  Description: INTERVENTIONS  - Assess for and report changes in neurological status  - Initiate measures to prevent increased intracranial pressure  - Maintain blood pressure and fluid volume within ordered parameters to optimize cerebral perfusion and minimize risk of hemorrhage  - Monitor temperature, glucose, and sodium. Initiate appropriate interventions as ordered  Outcome: Progressing  Goal: Achieves maximal functionality and self care  Description: INTERVENTIONS  - Monitor swallowing and airway patency with patient fatigue and changes in neurological status  - Encourage and assist patient to increase activity and self care with guidance from PT/OT  - Encourage visually impaired, hearing impaired and aphasic patients to use assistive/communication devices  Outcome: Progressing  Goal: Absence of seizures  Description: INTERVENTIONS  - Monitor for seizure activity  - Administer anti-seizure medications as ordered  - Monitor  neurological status  Outcome: Progressing  Goal: Remains free of injury related to seizure activity  Description: INTERVENTIONS:  - Maintain airway, patient safety  and administer oxygen as ordered  - Monitor patient for seizure activity, document and report duration and description of seizure to MD/LIP  - If seizure occurs, turn patient to side and suction secretions as needed  - Reorient patient post seizure  - Seizure pads on all 4 side rails  - Instruct patient/family to notify RN of any seizure activity  - Instruct patient/family to call for assistance with activity based on assessment  Outcome: Progressing     Problem: PAIN - ADULT  Goal: Verbalizes/displays adequate comfort level or patient's stated pain goal  Description: INTERVENTIONS:  - Encourage pt to monitor pain and request assistance  - Assess pain using appropriate pain scale  - Administer analgesics based on type and severity of pain and evaluate response  - Implement non-pharmacological measures as appropriate and evaluate response  - Consider cultural and social influences on pain and pain management  - Manage/alleviate anxiety  - Utilize distraction and/or relaxation techniques  - Monitor for opioid side effects  - Notify MD/LIP if interventions unsuccessful or patient reports new pain  - Anticipate increased pain with activity and pre-medicate as appropriate  Outcome: Progressing

## 2024-09-23 NOTE — CM/SW NOTE
Vance VERGARA with [employer] #[phone] is available to assist with any discharge planning needs.

## 2024-09-24 NOTE — DIETARY NOTE
[facility]   part of [facility]    NUTRITION ASSESSMENT    Pt meets severe malnutrition criteria at this time.    CRITERIA FOR MALNUTRITION DIAGNOSIS:  Criteria for severe malnutrition diagnosis: acute illness/injury related to wt loss greater than 2% in 1 week and energy intake less than 50% for greater than 5 days      NUTRITION INTERVENTION:    Meal and Snacks - Monitor and encourage adequate PO intake. Regular/general  Medical Food Supplements - Ensure Clear Daily . Rationale/use for oral supplements discussed.   Vitamin and Mineral Supplements - adding Multivitamin with minerals      PATIENT STATUS:   9/24 - Pt with new onset Aphasia, found to have new AIS. Per RN, doing better today. No n/v today but had emesis last night. Have BM this AM.  Pt seen with  at bedside.  Pt able to answer simple questions.   reports he feels appetite has improved in the last 24h. Pt able to eat some breakfast this AM.  Did not touch protein shake.  Offered Ensure Clear - pt agreeable to try.  Discussed using high maximo beverages to meet needs.  Family also bringing in food from outside. Encouraged intake.  All questions answered at time of visit    9/20: Pt is still not able to eat much and has not had adequate nutrition for 8 days. Pt was given Magic Cup but did not like. Switched to Nextpeer 1.4 and will assess adherence.  at bedside claimed pt was nauseous and had emesis yesterday but has resolved today. No other complaints besides lack of appetite. Pt has been weaned off pressor. If pt is not able to tolerate ONS and food, may need to consider alternative means of nutrition.    09/18/24 54/F admitted d/t elective craniotomy surgery. Screened d/t consult for low appetite. Surgery went well, improvement in arm strength and speech. Pt has become hypotensive needing brandon-synephrine. Pt reports appetite has been low since surgery. States she only takes a few bites of food. Family in room brought food  for her today, but not eating much. Per EMR pat has lost significant wt of 8 lbs (6%) within 12 days. Offered EPHP but refused. Pt willing to try Magic cup berry flavored BID. At F/U check for adherence to eating ONS.    PMH: ADHD, moyamoya disease with L ischemic stroke, Vit D, osteoporosis  Procedure: Left-sided frontotemporal lmbmxlwba-fihp-fytfz-synangiosis (EDAS) indirect bypass 9/13      ANTHROPOMETRICS:  Ht:  157.5 cm (5' 2\")  Wt: 56.7 kg (125 lb).   BMI: Body mass index is 22.86 kg/m².  IBW: 50 kg      WEIGHT HISTORY:   Weight loss: Yes, Severe Wt loss of 8 lbs, 6%, over 12 days     Wt Readings from Last 10 Encounters:   09/24/24 56.7 kg (125 lb)   09/22/24 55.9 kg (123 lb 3.8 oz)   09/04/24 60.9 kg (134 lb 3.2 oz)   08/28/24 60.6 kg (133 lb 9.6 oz)   06/21/22 59 kg (130 lb)   02/09/22 58 kg (127 lb 12.8 oz)   08/02/21 59 kg (130 lb)   03/17/21 59.4 kg (131 lb)   07/30/19 59 kg (130 lb)   05/14/19 56.7 kg (125 lb)        NUTRITION:  Diet:       Procedures    Regular/General diet Texture Consistency: Regular; Is Patient on Accuchecks? No      Food Allergies: No  Cultural/Ethnic/Hinduism Preferences Addressed: Yes    Percent Meals Eaten (last 3 days)       Date/Time Percent Meals Eaten (%)    09/21/24 0900 0 %    09/21/24 1200 0 %    09/21/24 1800 5 %    09/22/24 1030 50 %    09/23/24 0900 25 %    09/23/24 1400 50 %    09/23/24 1800 25 %            GI system review: WNL Last BM Date: 09/24/24  Skin and wounds: head    NUTRITION RELATED PHYSICAL FINDINGS:     1. Body Fat/Muscle Mass: no wasting noted, malnutrition related to PO intake and weight loss     2. Fluid Accumulation: upper extremity edema     NUTRITION PRESCRIPTION: 56.7kg  Calories: 7048-5409 calories/day (25-30 kcal/kg)  Protein: 68-85grams protein/day (1.2-1.5 grams protein per kg)  Fluid: ~1 ml/kcal or per MD discretion    NUTRITION DIAGNOSIS/PROBLEM:  Malnutrition related to insufficient appetite resulting in inadequate nutrition intake as  evidenced by documented/reported insufficient oral intake and documented/reported unintentional weight loss      MONITOR AND EVALUATE/NUTRITION GOALS:  PO intake of 75% of meals TID - Not met, Continues  PO intake of 75% of oral nutrition supplement/s - Not met, Continues  Weight stable within 1 to 2 lbs during admission - Ongoing      MEDICATIONS:  MV with mineral, Kcl 40 mEq, Senna, 0.9% @ 75    LABS:  K 3.3    Pt is at High nutrition risk    Moriah Gee RD, LDN, [facility]  Clinical Dietitian  Phone [phone]

## 2024-09-24 NOTE — PROGRESS NOTES
[facility]   NEUROCRITICAL CARE   PROGRESS NOTE    Admission date: 9/12/2024  Reason for Consult: Post op EDAS  Chief Complaint: Recent stroke  ________________________________________________________________    SUBJECTIVE     24 Hour Significant Events: No acute events overnight.    OBJECTIVE   VITAL SIGNS:   Temp:  [96.6 °F (35.9 °C)-98.3 °F (36.8 °C)] 98 °F (36.7 °C)  Pulse:  [] 70  Resp:  [12-28] 19  BP: (127-183)/() 149/85  SpO2:  [91 %-98 %] 97 %    INTAKE/OUTPUT:  Intake/Output Summary (Last 24 hours) at 9/24/2024 0854  Last data filed at 9/24/2024 0825  Gross per 24 hour   Intake 2642.15 ml   Output 2875 ml   Net -232.85 ml     PHYSICAL EXAM:  GENERAL APPEARANCE: Patient resting comfortably in bed, NAD  HEENT: Normocephalic, atraumatic.   LUNGS: Normal respiratory rate and effort   HEART: Regular rate and rhythm   ABDOMEN: Soft. No tenderness, guarding, or rebound.     NEUROLOGIC:    Mental Status:  A&O x self, place, Follows simple commands, slow mildly dysarthric speech, naming 3/3 and able to repeat 75% of the sentence (improved from yest)    Cranial nerves: PERRL.  Visual fields full.  EOMI.  R droop, more movement in face now.  Hearing grossly intact. Tongue midline   Motor: Drift:  RUE pronation; Motor exam is 5 out of 5 in all extremities   Sensation: Intact to light touch bilaterally  Cerebellar: Normal Finger-To-Nose test    LABORATORY DATA:  Last 24 hour labs were reviewed in detail.  Recent Labs   Lab 09/22/24  0445 09/23/24  0508 09/24/24  0444   * 135* 136   K 3.7  3.7 4.0  4.0 3.3*   CL 99 102 102   CO2 23.0 25.0 24.0   GLU 81 87 90   BUN 10 10 5*   CREATSERUM 0.66 0.65 0.53*     Recent Labs   Lab 09/22/24  0445 09/23/24  0508 09/24/24  0444   WBC 5.0 5.4 5.6   HGB 11.3* 10.8* 10.9*   .0 229.0 237.0     No results for input(s): \"TOTALPROTEIN\", \"ALBUMIN\", \"ALT\", \"AST\" in the last 168 hours.    Invalid input(s): \"TOTALBILIRUBIN\",  \"ALKPHOSPHATE\"    Recent Labs   Lab 09/21/24  0514 09/22/24  0445   MG 2.0 2.0       Scheduled Meds:   potassium chloride  40 mEq Oral Q4H    fludrocortisone  0.1 mg Oral Daily    aspirin  325 mg Oral Daily    amantadine  100 mg Oral BID    melatonin  5 mg Oral Nightly    sennosides  8.6 mg Oral Daily    multivitamin with minerals  1 tablet Oral Daily    midodrine  10 mg Oral TID    atorvastatin  40 mg Oral Nightly    heparin  5,000 Units Subcutaneous 2 times per day    vortioxetine  10 mg Oral Nightly    [Transfer Hold] progesterone  100 mg Oral Nightly     Continuous Infusions:   phenylephrine Stopped (09/23/24 0900)    sodium chloride 75 mL/hr at 09/24/24 0000     PRN Meds:  metoclopramide    acetaminophen    magnesium hydroxide    ondansetron    HYDROcodone-acetaminophen **OR** HYDROcodone-acetaminophen    morphINE    [Transfer Hold] bisacodyl    [Transfer Hold] fleet enema    Radiology:    No results found.  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 (9/3-9/5)  S/P craniotomy for left encephaloduroarteriosynangiosis         - Post op CT w/ expected post op changes          - C/b post op worsening of aphasia and droop, repeat CT stable          - EEG 9/14 with no focal, lateralized or generalized epileptiform discharges or seizures noted, ok to dc         - S/p Keppra 500 mg BID x7 days          - Continue asa and statin    - Rpt mri brain 9/22 with new AIS in L mca territory, will d/w Neurosurg possible transition to dapt for secondary stroke prevention         - speech and exam improved today, will liberalize -180, continue midodrine tid, inc florinef to 0.2 daily, and vasopressors prn         - PT/OT evals         -  Melatonin nightly to help with sleep.          - Added amantadine as well for neurostimulant properties      Cardiovascular:         - SBP goal as noted above          - Hold home medications     Pulmonary: - Saturating well on RA, encourage IS      Renal:   Mild hyponatremia        - Monitor I&Os     -Volume down, cont IVFs         -Daily BMP      Gastrointestinal:  Nutrition: - Gen diet     Atonic constipation  - Bowel regimen scheduled 9/17, had BM later in evening, continue current regimen      Infectious Disease: - No leukocytosis, afebrile, continue to monitor      Heme/Onc   Anemia - Hb goal > 7, daily CBC     Endocrine: - Accuchecks q6 with SSI prn            Checklist         - Lines: PIVs         - DVT Prophylaxis: SCDs, HSQ         - Diet: ADAT         - IVF: -          - Electrolytes: Replete per protocol         - Code Status: FULL     Dispo: CNICU      Greater than 40 minutes of critical care time (exclusive of billable procedures) was administered to manage and/or prevent neurologic instability. This involved direct patient intervention, complex decision making, and/or extensive discussions with the patient, family, and clinical staff.    Jackson Stinson MD [facility]  Neurocritical Care  [facility]

## 2024-09-24 NOTE — PLAN OF CARE
After ambulating pt to bathroom x2, BP in 140's. Recovered to 150's in 5 min without intervention. Neuro assessment unchanged. Purwick placed and pt bedrest for rest of shift. APN aware and agrees with strict bedrest. Emesis x 1 after PM pills. Reglan given. No c/o nausea for rest of shift. Denied pain for shift. K replacement per protocol this am. Recheck at 1338. No family present overnight

## 2024-09-24 NOTE — PROCEDURES
LONG-TERM VIDEO EEG REPORT;    Reason for Examination: Seizures/encephalopathy.    Technical Summary:   18 Channels of EEG and 1 Channel of EKG was performed utilizing internation 10/20 method. Motion, muscle and machine artifacts were noted.       Recording Start date/time: 09/23/2024 at 0736  Recording end date/time: 09/23/2024 at 1239      Background Activity:   The background activity consisted of 8 Hz waveforms, reactive to eye opening/ external stimulation.    Abnormality:  Throughout the recording low to medium voltage, polymorphic, 2 to 5 Hz slow activity was noted diffusely over both hemispheres      Activation:    Hyperventilation:   Not Performed.    Photic Stimulation:  Driving response seen.No       Sleep:  Stage I sleep seen.     Impression:  This is a Abnormal prolonged Video EEG study. No focal, lateralized or generalized epileptiform activity seen.   Moderate diffuse slowing into delta and theta range was noted.  This constellation of findings can be seen in encephalopathy due to metabolic/toxic etiology, medication effects or diffuse cerebral injury.  Clinical correlation is recommended.        Jarvis Jones MD  [facility].

## 2024-09-24 NOTE — PROGRESS NOTES
[facility] Hospitalist Progress Note     CC: Hospital Follow up    PCP: Mildred Steward MD   Assessment/Plan:     Active Problems:    Cerebrovascular accident (HCC)    Moyamoya    Moyamoya disease    Aphasia    Facial droop    Hypokalemia    Anemia    Atonic constipation    Hyponatremia      Patient is a 54 year old female with PMH sig for ADHD who presented for elective craniotomy for moyamoya disease.  She is status post EDAS.    Recent CVA  Moyamoya disease  Status post EDAS  - Neurochecks per protocol   - Pain management as needed  - PT/OT/speech evaluation-stable  - Keppra 500 mgTwice dailyx 7 days postsurgery  - Neurosurgery and neurointensivist following. Plan to transfer per neurosurgery   -asa, statin   -amantadine added by neurology for neurostimulant effect  - midodrine and florinef for BP management given low BP with neuro sx. Pressors only if needed     R sided facial droop-improved  R sided weakness- improved  - frequent neurochecks   - adequate BP control with goal normotensive.   - Speech eval-diet advanced  - MRI positive     Bradycardia  - noted on tele, patient asymptomatic  - pt has \"erratic HR\" per family, likely high in the setting of adderall  - obtain EKG-reviewed, no blocks noted  - telemetry    ADHD  - Recommend resuming home Adderall, might assist with improved blood pressure and heart rate    Prophylaxis:   DVT with scd, heparin subcu  Lines/Monitors:     Dispo: CCU  Code status: Full    Dispo: no discharge    Geoff Vega MD  [facility]- Hospitalist   [phone]     Subjective:     Patient still having difficulties with her speech. No chest pain, palpitations, shortness of breath, cough, nausea, vomiting, abdominal pain     OBJECTIVE:    Blood pressure (!) 161/101, pulse 77, temperature 98.3 °F (36.8 °C), temperature source Temporal, resp. rate 18, weight 125 lb (56.7 kg), SpO2 98%, not currently breastfeeding.    Temp:  [96.6 °F (35.9 °C)-98.3 °F (36.8 °C)] 98.3 °F (36.8  °C)  Pulse:  [] 77  Resp:  [12-28] 18  BP: (107-183)/() 161/101  SpO2:  [91 %-100 %] 98 %      Intake/Output:    Intake/Output Summary (Last 24 hours) at 9/24/2024 0717  Last data filed at 9/24/2024 0600  Gross per 24 hour   Intake 2442.15 ml   Output 2375 ml   Net 67.15 ml       Last 3 Weights   09/24/24 0500 125 lb (56.7 kg)   09/23/24 0700 125 lb 3.5 oz (56.8 kg)   09/22/24 0400 123 lb 3.8 oz (55.9 kg)   09/21/24 0600 125 lb 7.1 oz (56.9 kg)   09/18/24 0800 126 lb 5.2 oz (57.3 kg)   09/15/24 0400 133 lb 2.5 oz (60.4 kg)   09/14/24 0444 132 lb 11.5 oz (60.2 kg)   09/12/24 1944 134 lb 14.7 oz (61.2 kg)   09/12/24 1926 134 lb 14.7 oz (61.2 kg)   09/22/24 1040 123 lb 3.8 oz (55.9 kg)   09/04/24 0600 134 lb 3.2 oz (60.9 kg)   08/30/24 0928 132 lb (59.9 kg)       Exam   Gen: No acute distress, alert and oriented x3, no new focal neurologic deficits, tired  Heent: Surgical staples intact, clean and dry, drain has been removed   pulm: Lungs clear, normal respiratory effort, no crackles, no wheezing  CV: Heart with regular rate and rhythm, no peripheral edema, no gross murmurs  Abd: Abdomen soft, nontender, nondistended, bowel sounds present  MSK: Full range of motion in extremities   Skin: no new rashes or lesions  Neuro: AO*3, right-sided facial droop, otherwise, neuro exam is grossly intact, delayed speech   Psyc: Depressed mood      Data Review:       Labs:     Recent Labs   Lab 09/19/24  0528 09/20/24  0431 09/21/24  0514 09/22/24  0445 09/23/24  0508 09/24/24  0444   RBC 3.83 3.96   < > 3.76* 3.52* 3.56*   HGB 11.6* 11.9*   < > 11.3* 10.8* 10.9*   HCT 33.2* 34.0*   < > 32.2* 30.6* 31.0*   MCV 86.7 85.9   < > 85.6 86.9 87.1   MCH 30.3 30.1   < > 30.1 30.7 30.6   MCHC 34.9 35.0   < > 35.1 35.3 35.2   RDW 12.2 12.2   < > 12.2 12.4 12.3   NEPRELIM 2.79 3.07  --   --   --   --    WBC 4.3 4.5   < > 5.0 5.4 5.6   .0 262.0   < > 246.0 229.0 237.0    < > = values in this interval not displayed.          Recent Labs   Lab 09/22/24  0445 09/23/24  0508 09/24/24  0444   GLU 81 87 90   BUN 10 10 5*   CREATSERUM 0.66 0.65 0.53*   EGFRCR 105 105 111   CA 9.5 9.2 9.1   * 135* 136   K 3.7  3.7 4.0  4.0 3.3*   CL 99 102 102   CO2 23.0 25.0 24.0       No results for input(s): \"ALT\", \"AST\", \"ALB\", \"AMYLASE\", \"LIPASE\", \"LDH\" in the last 168 hours.    Invalid input(s): \"ALPHOS\", \"TBIL\", \"DBIL\", \"TPROT\"        Imaging:  MRI BRAIN (CPT=70551)    Addendum Date: 9/22/2024    ADDENDUM:  This critical result was discussed with Nurse Farrah at 1627 hours on 9/22/2024. Read back was performed.  Dictated by (CST): Jerod Jean MD on 9/22/2024 at 4:26 PM     Finalized by (CST): Jerod Jean MD on 9/22/2024 at 4:27 PM             Result Date: 9/22/2024  CONCLUSION:  New acute infarction in the mid left MCA territory is noted.  This occupies approximately 1/3 of the left MCA territory.  LOCATION:  [facility]   Dictated by (CST): Jerod Jean MD on 9/22/2024 at 4:08 PM     Finalized by (CST): Jerod Jean MD on 9/22/2024 at 4:13 PM          Meds:      potassium chloride  40 mEq Oral Q4H    fludrocortisone  0.1 mg Oral Daily    aspirin  325 mg Oral Daily    amantadine  100 mg Oral BID    melatonin  5 mg Oral Nightly    sennosides  8.6 mg Oral Daily    multivitamin with minerals  1 tablet Oral Daily    midodrine  10 mg Oral TID    atorvastatin  40 mg Oral Nightly    heparin  5,000 Units Subcutaneous 2 times per day    vortioxetine  10 mg Oral Nightly    [Transfer Hold] progesterone  100 mg Oral Nightly      phenylephrine Stopped (09/23/24 0900)    sodium chloride 75 mL/hr at 09/24/24 0000         metoclopramide    acetaminophen    magnesium hydroxide    ondansetron    HYDROcodone-acetaminophen **OR** HYDROcodone-acetaminophen    morphINE    [Transfer Hold] bisacodyl    [Transfer Hold] fleet enema

## 2024-09-24 NOTE — PHYSICAL THERAPY NOTE
PHYSICAL THERAPY TREATMENT NOTE - INPATIENT    Room Number: 6620/6620-A     Session: 3     Number of Visits to Meet Established Goals: 4  History related to current admission: Patient is a 54 year old female admitted on 9/12/2024 from home for L crani.  Pt with recent L hemispheric stroke on 9/3, discharged home on 9/5.  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  Post-op with aphasia and R UE weakness.  CTH stable. Hypotensive/bradycardic episode while in the chair 9/17 - placed on bedrest through 9/18, placed back on bedrest through 9/20. Delayed responses/lethargy  9/22 -\"MRI brain obtained with concern for new L MCA infarct\", placed back on bedrest        HOME SITUATION  Type of Home: House   Home Layout: Two level     Lives With: Spouse (one child living at home, second child away at school)  Drives: Yes     Prior Level of [place]: per  at b/s: community indep; after recent stroke, pt indep, did not return to work, however was busy with scrapbooking activities; reported deficits with slow speech, declines difficulty with word finding  Presenting Problem: moyamoya  Co-Morbidities : L hemispheric stroke 9/3/2024, ADHD, vit D deficiency, mcclain mcclain    ASSESSMENT   Patient demonstrates good  progress this session, goals remain in progress.After being on bedrest pt still demonstrating good functional strength during transfer from bed > commode > bedside chair.     Patient continues to function below baseline with bed mobility, transfers, gait, and stair negotiation. Contributing factors to remaining limitations include decreased endurance/aerobic capacity, decreased muscular endurance, and medical status.  Next session anticipate patient to progress gait and stair negotiation.  Physical Therapy will continue to follow patient for duration of hospitalization.    Patient continues to benefit from continued skilled PT services: for duration of hospitalization, however, given the  patient is functioning near baseline level do not anticipate skilled therapy needs at discharge .     PLAN  PT Treatment Plan: Patient education;Gait training;Neuromuscular re-educate;Transfer training;Stair training;Balance training  Rehab Potential : Good  Frequency (Obs):  (1-3x per week)    CURRENT GOALS     Goal #1 Patient is able to demonstrate supine - sit EOB @ level: independent      Goal #2 Patient is able to demonstrate transfers EOB to/from Chair/Wheelchair at assistance level: modified independent      Goal #3 Patient is able to ambulate 200 feet with assist device: none at assistance level: supervision      Goal #4     Goal #5     Goal #6     Goal Comments: Goals established on 2024 all goals ongoing    SUBJECTIVE      OBJECTIVE  Precautions:  (-180)    WEIGHT BEARING RESTRICTION  Weight Bearing Restriction: None                PAIN ASSESSMENT   Ratin  Location: L incisional/neck  Management Techniques: Activity promotion;Body mechanics;Repositioning    BALANCE                                                                                                                       Static Sitting: Good  Dynamic Sitting: Good           Static Standing: Fair -  Dynamic Standing: Fair -    ACTIVITY TOLERANCE           BP: 132/82  BP Location: Right arm  BP Method: Automatic  Patient Position: Standing (after ambulating)    O2 WALK         AM-PAC '6-Clicks' INPATIENT SHORT FORM - BASIC MOBILITY  How much difficulty does the patient currently have...  Patient Difficulty: Turning over in bed (including adjusting bedclothes, sheets and blankets)?: None   Patient Difficulty: Sitting down on and standing up from a chair with arms (e.g., wheelchair, bedside commode, etc.): None   Patient Difficulty: Moving from lying on back to sitting on the side of the bed?: None   How much help from another person does the patient currently need...   Help from Another: Moving to and from a bed to a chair  (including a wheelchair)?: None   Help from Another: Need to walk in hospital room?: None   Help from Another: Climbing 3-5 steps with a railing?: A Little       AM-PAC Score:  Raw Score: 23   Approx Degree of Impairment: 11.2%   Standardized Score (AM-PAC Scale): 56.93   CMS Modifier (G-Code): CI    FUNCTIONAL ABILITY STATUS  Gait Assessment   Functional Mobility/Gait Assessment  Gait Assistance: Supervision  Distance (ft): 150  Assistive Device: Rolling walker  Pattern: Within Functional Limits    Skilled Therapy Provided  Therapist discussed case with Dr. Shantell rahman to participate in treatment session BP parameters 140-180    Bed Mobility:  Rolling: NT  Supine<>Sit: NT     Transfer Mobility:  Sit<>Stand: SBA   Stand<>Sit: SBA   Gait: SBA    Therapist's Comments: SBP in sitting 138 pt educated on inc time for transitional mobility, performing exercises as discussed between transfers, discussed with RN - ok to proceed to gait training, pt tolerated well with RW, SBP in standing post ambulation 132    Patient End of Session: Up in chair;Needs met;Call light within reach;RN aware of session/findings;All patient questions and concerns addressed;Family present    PT Session Time: 15 minutes  Gait Training: 15 minutes  Therapeutic Activity: minutes  Therapeutic Exercise:  minutes   Neuromuscular Re-education:  minutes

## 2024-09-24 NOTE — PROGRESS NOTES
[facility] Hospitalist Progress Note     CC: Hospital Follow up    PCP: Mildred Steward MD   Assessment/Plan:     Active Problems:    Cerebrovascular accident (HCC)    Moyamoya    Moyamoya disease    Aphasia    Facial droop    Hypokalemia    Anemia    Atonic constipation    Hyponatremia      Patient is a 54 year old female with PMH sig for ADHD who presented for elective craniotomy for moyamoya disease.  She is status post EDAS.    Recent CVA  Moyamoya disease  Status post EDAS  - Neurochecks per protocol   - Pain management as needed  - PT/OT/speech evaluation-stable  - Keppra 500 mgTwice dailyx 7 days postsurgery  - Neurosurgery and neurointensivist following. Plan to transfer per neurosurgery   -asa, statin   -amantadine added by neurology for neurostimulant effect  - midodrine and florinef for BP management given low BP with neuro sx. Pressors only if needed     R sided facial droop-improved  R sided weakness- improved  - frequent neurochecks   - adequate BP control with goal normotensive.   - Speech eval-diet advanced  - MRI positive     Bradycardia  - noted on tele, patient asymptomatic  - pt has \"erratic HR\" per family, likely high in the setting of adderall  - obtain EKG-reviewed, no blocks noted  - telemetry    ADHD  - Recommend resuming home Adderall, might assist with improved blood pressure and heart rate    Prophylaxis:   DVT with scd, heparin subcu  Lines/Monitors:     Dispo: CCU  Code status: Full    Dispo: no discharge    Geoff Vega MD  [facility]- Hospitalist   [phone]     Subjective:     Patient still having difficulties with her speech. No chest pain, palpitations, shortness of breath, cough, nausea, vomiting, abdominal pain     OBJECTIVE:    Blood pressure 159/86, pulse 69, temperature 97.7 °F (36.5 °C), temperature source Temporal, resp. rate 12, weight 125 lb 3.5 oz (56.8 kg), SpO2 96%, not currently breastfeeding.    Temp:  [96.6 °F (35.9 °C)-98.2 °F (36.8 °C)] 97.7 °F (36.5  °C)  Pulse:  [62-87] 69  Resp:  [10-28] 12  BP: (107-183)/() 159/86  SpO2:  [90 %-100 %] 96 %      Intake/Output:    Intake/Output Summary (Last 24 hours) at 9/23/2024 2131  Last data filed at 9/23/2024 2000  Gross per 24 hour   Intake 1468.75 ml   Output 1975 ml   Net -506.25 ml       Last 3 Weights   09/23/24 0700 125 lb 3.5 oz (56.8 kg)   09/22/24 0400 123 lb 3.8 oz (55.9 kg)   09/21/24 0600 125 lb 7.1 oz (56.9 kg)   09/18/24 0800 126 lb 5.2 oz (57.3 kg)   09/15/24 0400 133 lb 2.5 oz (60.4 kg)   09/14/24 0444 132 lb 11.5 oz (60.2 kg)   09/12/24 1944 134 lb 14.7 oz (61.2 kg)   09/12/24 1926 134 lb 14.7 oz (61.2 kg)   09/22/24 1040 123 lb 3.8 oz (55.9 kg)   09/04/24 0600 134 lb 3.2 oz (60.9 kg)   08/30/24 0928 132 lb (59.9 kg)       Exam   Gen: No acute distress, alert and oriented x3, no new focal neurologic deficits, tired  Heent: Surgical staples intact, clean and dry, drain has been removed   pulm: Lungs clear, normal respiratory effort, no crackles, no wheezing  CV: Heart with regular rate and rhythm, no peripheral edema, no gross murmurs  Abd: Abdomen soft, nontender, nondistended, bowel sounds present  MSK: Full range of motion in extremities   Skin: no new rashes or lesions  Neuro: AO*3, right-sided facial droop, otherwise, neuro exam is grossly intact, delayed speech   Psyc: Depressed mood      Data Review:       Labs:     Recent Labs   Lab 09/17/24  0412 09/18/24  0438 09/19/24  0528 09/20/24  0431 09/21/24  0514 09/22/24  0445 09/23/24  0508   RBC 3.46*   < > 3.83 3.96 3.65* 3.76* 3.52*   HGB 10.7*   < > 11.6* 11.9* 11.1* 11.3* 10.8*   HCT 29.3*   < > 33.2* 34.0* 31.5* 32.2* 30.6*   MCV 84.7   < > 86.7 85.9 86.3 85.6 86.9   MCH 30.9   < > 30.3 30.1 30.4 30.1 30.7   MCHC 36.5   < > 34.9 35.0 35.2 35.1 35.3   RDW 11.9   < > 12.2 12.2 12.3 12.2 12.4   NEPRELIM 3.32  --  2.79 3.07  --   --   --    WBC 6.2   < > 4.3 4.5 4.7 5.0 5.4   .0   < > 271.0 262.0 209.0 246.0 229.0    < > = values in this  interval not displayed.         Recent Labs   Lab 09/21/24  0514 09/22/24  0445 09/23/24  0508   GLU 90 81 87   BUN 10 10 10   CREATSERUM 0.69 0.66 0.65   EGFRCR 104 105 105   CA 9.5 9.5 9.2    135* 135*   K 3.8  3.8 3.7  3.7 4.0  4.0    99 102   CO2 28.0 23.0 25.0       No results for input(s): \"ALT\", \"AST\", \"ALB\", \"AMYLASE\", \"LIPASE\", \"LDH\" in the last 168 hours.    Invalid input(s): \"ALPHOS\", \"TBIL\", \"DBIL\", \"TPROT\"        Imaging:  MRI BRAIN (CPT=70551)    Addendum Date: 9/22/2024    ADDENDUM:  This critical result was discussed with Nurse Farrah at 1627 hours on 9/22/2024. Read back was performed.  Dictated by (CST): Jerod Jean MD on 9/22/2024 at 4:26 PM     Finalized by (CST): Jerod Jean MD on 9/22/2024 at 4:27 PM             Result Date: 9/22/2024  CONCLUSION:  New acute infarction in the mid left MCA territory is noted.  This occupies approximately 1/3 of the left MCA territory.  LOCATION:  [facility]   Dictated by (CST): Jerod Jean MD on 9/22/2024 at 4:08 PM     Finalized by (CST): Jerod Jean MD on 9/22/2024 at 4:13 PM          Meds:      fludrocortisone  0.1 mg Oral Daily    aspirin  325 mg Oral Daily    amantadine  100 mg Oral BID    melatonin  5 mg Oral Nightly    sennosides  8.6 mg Oral Daily    multivitamin with minerals  1 tablet Oral Daily    midodrine  10 mg Oral TID    atorvastatin  40 mg Oral Nightly    heparin  5,000 Units Subcutaneous 2 times per day    vortioxetine  10 mg Oral Nightly    [Transfer Hold] progesterone  100 mg Oral Nightly      phenylephrine Stopped (09/23/24 0900)    sodium chloride 75 mL/hr at 09/23/24 1900         metoclopramide    acetaminophen    magnesium hydroxide    ondansetron    HYDROcodone-acetaminophen **OR** HYDROcodone-acetaminophen    morphINE    [Transfer Hold] bisacodyl    [Transfer Hold] fleet enema

## 2024-09-25 NOTE — PROGRESS NOTES
[facility]   NEUROCRITICAL CARE   PROGRESS NOTE    Admission date: 9/12/2024  Reason for Consult: Post op EDAS  Chief Complaint: Recent stroke  ________________________________________________________________    SUBJECTIVE     24 Hour Significant Events: No acute events overnight.    OBJECTIVE   VITAL SIGNS:   Temp:  [97.1 °F (36.2 °C)-98.4 °F (36.9 °C)] 98.4 °F (36.9 °C)  Pulse:  [53-81] 59  Resp:  [12-23] 18  BP: (122-177)/() 160/89  SpO2:  [93 %-99 %] 97 %    INTAKE/OUTPUT:  Intake/Output Summary (Last 24 hours) at 9/25/2024 0847  Last data filed at 9/25/2024 0823  Gross per 24 hour   Intake 2884 ml   Output 2950 ml   Net -66 ml     PHYSICAL EXAM:  GENERAL APPEARANCE: Patient resting comfortably in bed, NAD  HEENT: Normocephalic, atraumatic.   LUNGS: Normal respiratory rate and effort   HEART: Regular rate and rhythm   ABDOMEN: Soft. No tenderness, guarding, or rebound.     NEUROLOGIC:    Mental Status:  A&O x self, place, Follows simple commands, slow mildly dysarthric speech, naming 3/3 and able to repeat 75% of the sentence   Cranial nerves: PERRL.  Visual fields full.  EOMI.  R droop, more movement in face now.  Hearing grossly intact. Tongue midline   Motor: Drift:  RUE pronation; Motor exam is 5 out of 5 in all extremities   Sensation: Intact to light touch bilaterally  Cerebellar: Normal Finger-To-Nose test    LABORATORY DATA:  Last 24 hour labs were reviewed in detail.  Recent Labs   Lab 09/23/24  0508 09/24/24  0444 09/24/24  1417 09/25/24  0441   * 136  --  137   K 4.0  4.0 3.3* 4.1 3.6    102  --  105   CO2 25.0 24.0  --  26.0   GLU 87 90  --  94   BUN 10 5*  --  <5*   CREATSERUM 0.65 0.53*  --  0.57     Recent Labs   Lab 09/23/24  0508 09/24/24  0444 09/25/24  0441   WBC 5.4 5.6 4.7   HGB 10.8* 10.9* 10.8*   .0 237.0 236.0     No results for input(s): \"TOTALPROTEIN\", \"ALBUMIN\", \"ALT\", \"AST\" in the last 168 hours.    Invalid input(s): \"TOTALBILIRUBIN\",  \"ALKPHOSPHATE\"    Recent Labs   Lab 09/21/24  0514 09/22/24  0445   MG 2.0 2.0       Scheduled Meds:   potassium chloride  40 mEq Oral Q4H    fludrocortisone  0.2 mg Oral Daily    enoxaparin  40 mg Subcutaneous Daily    clopidogrel  75 mg Oral Daily    aspirin  81 mg Oral Daily    melatonin  5 mg Oral Nightly    sennosides  8.6 mg Oral Daily    multivitamin with minerals  1 tablet Oral Daily    midodrine  10 mg Oral TID    atorvastatin  40 mg Oral Nightly    vortioxetine  10 mg Oral Nightly    [Transfer Hold] progesterone  100 mg Oral Nightly     Continuous Infusions:   phenylephrine Stopped (09/23/24 0900)    sodium chloride 75 mL/hr at 09/24/24 1400     PRN Meds:  metoclopramide    acetaminophen    magnesium hydroxide    ondansetron    HYDROcodone-acetaminophen **OR** HYDROcodone-acetaminophen    morphINE    [Transfer Hold] bisacodyl    [Transfer Hold] fleet enema    Radiology:    No results found.  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 (9/3-9/5)  S/P craniotomy for left encephaloduroarteriosynangiosis         - Post op CT w/ expected post op changes          - C/b post op worsening of aphasia and droop, repeat CT stable          - EEG 9/14 with no focal, lateralized or generalized epileptiform discharges or seizures noted, ok to dc         - S/p Keppra 500 mg BID x7 days          - Continue asa and statin    - Rpt mri brain 9/22 with new AIS in L mca territory, transitioned to dapt for secondary stroke prevention         - speech and exam improved today, will liberalize -180, continue midodrine and florinef          - PT/OT evals         - Melatonin nightly to help with sleep.          - Added amantadine as well for  neurostimulant properties     No further neurocritical care needs at this time, further management and outpt follow-up per Neurosurg, will follow peripherally please call with any questions.      Cardiovascular:         - SBP goal as noted above          - Hold home medications     Pulmonary: - Saturating well on RA, encourage IS      Renal:   Mild hyponatremia        - Monitor I&Os     -Volume down, cont IVFs         -Daily BMP      Gastrointestinal:  Nutrition: - Gen diet     Atonic constipation  - Bowel regimen scheduled 9/17, had BM later in evening, continue current regimen      Infectious Disease: - No leukocytosis, afebrile, continue to monitor      Heme/Onc   Anemia - Hb goal > 7, daily CBC     Endocrine: - Accuchecks q6 with SSI prn            Checklist         - Lines: PIVs         - DVT Prophylaxis: SCDs, HSQ         - Diet: ADAT         - IVF: -          - Electrolytes: Replete per protocol         - Code Status: FULL     Dispo: CNICU      Greater than 40 minutes of critical care time (exclusive of billable procedures) was administered to manage and/or prevent neurologic instability. This involved direct patient intervention, complex decision making, and/or extensive discussions with the patient, family, and clinical staff.    Jackson Stinson MD [facility]  Neurocritical Care  [facility]

## 2024-09-25 NOTE — DISCHARGE SUMMARY
[facility]  Discharge Summary    Isabella Kevin Patient Status:  Inpatient    1970 MRN [id number]   Location [facility] 6NE-A Attending Cornelio Guzman MD   Hosp Day # 13 PCP Mildred Steward MD     Date of Admission: 2024    Date of Discharge: 24    Admitting Diagnosis: MoyaMoya disease  Moyamoya  Moyamoya disease    Discharge Diagnosis: Moyamoya disease   Patient Active Problem List   Diagnosis    Vitamin D deficiency    Primary hypertension    Idiopathic hypersomnia    Hyperglycemia    Speech disturbance, unspecified type    Cerebrovascular accident (HCC)    Moyamoya    Moyamoya disease    Aphasia    Facial droop    Hypokalemia    Anemia    Atonic constipation    Hyponatremia    Moyamoya disease [I67.5]Cerebrovascular accident (CVA) due to other mechanism (HCC) [I63.89]    Reason for Admission: Moyamoya disease s/p L EDAS    Procedures: CRANIOTOMY FOR A LEFT ENCEPHALODUROARTERIOSYNANGIOSIS (EDAS) INDIRECT BYPASS     Hospital Course: 55 y/o female with PMH of ADHD who presented to the ED on  with acute slurred speech and language difficulty. MRI demonstrated a left hemisphere focus of infarct. CTA head and neck revealed left ICA terminus steno-occlusive disease consistent with Moyamoya. Patient was started on ASA and Plavix. She was admitted to [facility] on 24 and underwent L craniotomy for left encephaloduroarteriosynangiosis. She was admitted to the neurocritical care unit post-operatively for close neurological monitoring. She was noted post-op to have aphasia and right sided weakness. She was found to be pressure dependent and started on Evelio gtt to increase BP. She was trialed titrating off the Evelio gtt and found to be symptomatic at lower SBP towards 120s and was started on Midodrine and then Florinef to optimize SBP with improvement in her symptoms. She was resumed on ASA post-op. She had a post-op MRI performed on 24 that revealed a new mid L MCA  infarct. She worked with therapies and was recommended for outpatient speech therapy. She was deemed stable for discharge home on 9/25       Complications: None    Discharge Condition: Fair    Disposition: Home or Self Care    Discharge Medications:   Current Discharge Medication List        CONTINUE these medications which have NOT CHANGED    Details   aspirin 325 MG Oral Tab Take 1 tablet (325 mg total) by mouth daily.  Qty: 30 tablet, Refills: 5      estradiol 0.025 MG/24HR Transdermal Patch Biweekly Place 1 patch onto the skin twice a week.      progesterone 100 MG Oral Cap Take 1 capsule (100 mg total) by mouth nightly.      vortioxetine 10 MG Oral Tab Take 1 tablet (10 mg total) by mouth nightly.      atorvastatin 40 MG Oral Tab Take 1 tablet (40 mg total) by mouth nightly.  Qty: 30 tablet, Refills: 2      amphetamine-dextroamphetamine 10 MG Oral Tab Take 1 tablet (10 mg total) by mouth 3 (three) times daily as needed.             Follow up Visits:   Future Appointments   Date Time Provider Department Center   9/27/2024 10:45 AM Sheridan Anand APRN ENINAPER2 [facility]   9/30/2024  5:15 PM Maryjo Hedrick [facility]   10/7/2024  3:45 PM Maryjo Hedrick, [facility]   10/14/2024  3:45 PM Maryjo Hedrick, [facility]   10/21/2024  3:45 PM Maryjo Hedrick [facility]   10/25/2024  9:30 AM Cornelio Guzman MD EN[facility]AP[facility]2 [facility]   10/28/2024  5:15 PM Maryjo Hedrick [facility]   11/4/2024  3:45 PM Maryjo Hedrick [facility]   11/11/2024  3:45 PM Maryjo Hedrick, [facility]       Patient Instructions:  Activity: Take it easy for next several weeks. Recommend no returning to work until cleared at follow up appointments. No driving while on analgesics.  Monitor for stroke like symptoms. If concerns, recommend laying flat in the bed and calling 911.  Wound Care: Keep incision clean and dry. No ointments or  creams to incision site. Use baby shampoo only, no conditioners or hair products until cleared by neurosurgery.     Amina Rosenberg PA-C  [facility]  9/25/2024 10:22 AM

## 2024-09-25 NOTE — CM/SW NOTE
09/25/24 1200   Discharge disposition   Expected discharge disposition Home or Self   Discharge transportation Private car     Pt discussed in rounds and she is discharging today.  No skilled discharge needs identified.    / to remain available for support and/or discharge planning.     Mary WELLERA MSN, RN CTL/  [id number]

## 2024-09-25 NOTE — PLAN OF CARE
No new neuro deficits overnight. Emesis x 1 at 2200. Nausea relieved with zofran. Tolerating ambulation to bathroom with BP remaining in parameters. No c/o pain. See flowsheets

## 2024-09-25 NOTE — PROGRESS NOTES
Patient inpatient calling out to spouse Manjinder  for hospital follow up appt         Visit Type: ivonne   Date of TIA/Stroke: 9/13/2024  Type of Stroke: Ischemic   Patient to follow-up: 4 weeks   OP Neurologist: Dr. Issa   New patient to neurology service: yes   Anticipated discharge (if known): 9/25/2024  Discharge disposition (if known): Home      Please contact patient or spouse Manjinder Kevin at [phone] to schedule stroke follow-up appointment.       Davis Issa MD  Neurology; Vascular Neurology  [address]   [facility] [address]  [place] [address]  [phone]  Appt 11/15/2024 @ 11:10    Confirmed with pt spouse   Closing  Encounter

## 2024-09-25 NOTE — PROGRESS NOTES
[facility] Hospitalist Progress Note     CC: Hospital Follow up    PCP: Mildred Steward MD   Assessment/Plan:     Active Problems:    Cerebrovascular accident (HCC)    Moyamoya    Moyamoya disease    Aphasia    Facial droop    Hypokalemia    Anemia    Atonic constipation    Hyponatremia      Patient is a 54 year old female with PMH sig for ADHD who presented for elective craniotomy for moyamoya disease.  She is status post EDAS.    Recent CVA  Moyamoya disease  Status post EDAS  - Neurochecks per protocol   - Pain management as needed  - PT/OT/speech evaluation-stable  - Keppra 500 mgTwice dailyx 7 days postsurgery  - Neurosurgery and neurointensivist following. Plan to transfer per neurosurgery   -asa, statin   -amantadine added by neurology for neurostimulant effect  - midodrine and florinef for BP management given low BP with neuro sx. Pressors only if needed     R sided facial droop-improved  R sided weakness- improved  - frequent neurochecks   - adequate BP control with goal normotensive.   - Speech eval-diet advanced  - MRI positive     Bradycardia  - noted on tele, patient asymptomatic  - pt has \"erratic HR\" per family, likely high in the setting of adderall  - obtain EKG-reviewed, no blocks noted  - telemetry    ADHD  - Recommend resuming home Adderall, might assist with improved blood pressure and heart rate    Prophylaxis:   DVT with scd, heparin subcu  Lines/Monitors:     Dispo: CCU  Code status: Full    Dispo: no discharge    Geoff Vega MD  [facility]- Hospitalist   [phone]     Subjective:     Patient still having difficulties with her speech. No chest pain, palpitations, shortness of breath, cough, nausea, vomiting, abdominal pain     OBJECTIVE:    Blood pressure 146/84, pulse 53, temperature 97.4 °F (36.3 °C), temperature source Temporal, resp. rate 16, weight 123 lb 6.4 oz (56 kg), SpO2 95%, not currently breastfeeding.    Temp:  [97.1 °F (36.2 °C)-98.4 °F (36.9 °C)] 97.4 °F (36.3  °C)  Pulse:  [53-81] 53  Resp:  [12-23] 16  BP: (122-177)/() 146/84  SpO2:  [93 %-99 %] 95 %      Intake/Output:    Intake/Output Summary (Last 24 hours) at 9/25/2024 0806  Last data filed at 9/25/2024 0600  Gross per 24 hour   Intake 2927 ml   Output 2950 ml   Net -23 ml       Last 3 Weights   09/25/24 0400 123 lb 6.4 oz (56 kg)   09/24/24 0500 125 lb (56.7 kg)   09/23/24 0700 125 lb 3.5 oz (56.8 kg)   09/22/24 0400 123 lb 3.8 oz (55.9 kg)   09/21/24 0600 125 lb 7.1 oz (56.9 kg)   09/18/24 0800 126 lb 5.2 oz (57.3 kg)   09/15/24 0400 133 lb 2.5 oz (60.4 kg)   09/14/24 0444 132 lb 11.5 oz (60.2 kg)   09/12/24 1944 134 lb 14.7 oz (61.2 kg)   09/12/24 1926 134 lb 14.7 oz (61.2 kg)   09/22/24 1040 123 lb 3.8 oz (55.9 kg)   09/04/24 0600 134 lb 3.2 oz (60.9 kg)   08/30/24 0928 132 lb (59.9 kg)       Exam   Gen: No acute distress, alert and oriented x3, no new focal neurologic deficits, tired  Heent: Surgical staples intact, clean and dry, drain has been removed   pulm: Lungs clear, normal respiratory effort, no crackles, no wheezing  CV: Heart with regular rate and rhythm, no peripheral edema, no gross murmurs  Abd: Abdomen soft, nontender, nondistended, bowel sounds present  MSK: Full range of motion in extremities   Skin: no new rashes or lesions  Neuro: AO*3, right-sided facial droop, otherwise, neuro exam is grossly intact, delayed speech   Psyc: Depressed mood      Data Review:       Labs:     Recent Labs   Lab 09/19/24  0528 09/20/24  0431 09/21/24  0514 09/23/24  0508 09/24/24  0444 09/25/24  0441   RBC 3.83 3.96   < > 3.52* 3.56* 3.50*   HGB 11.6* 11.9*   < > 10.8* 10.9* 10.8*   HCT 33.2* 34.0*   < > 30.6* 31.0* 29.8*   MCV 86.7 85.9   < > 86.9 87.1 85.1   MCH 30.3 30.1   < > 30.7 30.6 30.9   MCHC 34.9 35.0   < > 35.3 35.2 36.2   RDW 12.2 12.2   < > 12.4 12.3 12.7   NEPRELIM 2.79 3.07  --   --   --   --    WBC 4.3 4.5   < > 5.4 5.6 4.7   .0 262.0   < > 229.0 237.0 236.0    < > = values in this  interval not displayed.         Recent Labs   Lab 09/23/24  0508 09/24/24  0444 09/24/24  1417 09/25/24  0441   GLU 87 90  --  94   BUN 10 5*  --  <5*   CREATSERUM 0.65 0.53*  --  0.57   EGFRCR 105 111  --  109   CA 9.2 9.1  --  9.5   * 136  --  137   K 4.0  4.0 3.3* 4.1 3.6    102  --  105   CO2 25.0 24.0  --  26.0       No results for input(s): \"ALT\", \"AST\", \"ALB\", \"AMYLASE\", \"LIPASE\", \"LDH\" in the last 168 hours.    Invalid input(s): \"ALPHOS\", \"TBIL\", \"DBIL\", \"TPROT\"        Imaging:  MRI BRAIN (CPT=70551)    Addendum Date: 9/22/2024    ADDENDUM:  This critical result was discussed with Nurse Farrah at 1627 hours on 9/22/2024. Read back was performed.  Dictated by (CST): Jerod Jean MD on 9/22/2024 at 4:26 PM     Finalized by (CST): Jerod Jean MD on 9/22/2024 at 4:27 PM             Result Date: 9/22/2024  CONCLUSION:  New acute infarction in the mid left MCA territory is noted.  This occupies approximately 1/3 of the left MCA territory.  LOCATION:  [facility]   Dictated by (CST): Jerod Jean MD on 9/22/2024 at 4:08 PM     Finalized by (CST): Jerod Jean MD on 9/22/2024 at 4:13 PM          Meds:      potassium chloride  40 mEq Oral Q4H    fludrocortisone  0.2 mg Oral Daily    enoxaparin  40 mg Subcutaneous Daily    clopidogrel  75 mg Oral Daily    aspirin  81 mg Oral Daily    melatonin  5 mg Oral Nightly    sennosides  8.6 mg Oral Daily    multivitamin with minerals  1 tablet Oral Daily    midodrine  10 mg Oral TID    atorvastatin  40 mg Oral Nightly    vortioxetine  10 mg Oral Nightly    [Transfer Hold] progesterone  100 mg Oral Nightly      phenylephrine Stopped (09/23/24 0900)    sodium chloride 75 mL/hr at 09/24/24 1400         metoclopramide    acetaminophen    magnesium hydroxide    ondansetron    HYDROcodone-acetaminophen **OR** HYDROcodone-acetaminophen    morphINE    [Transfer Hold] bisacodyl    [Transfer Hold] fleet enema

## 2024-09-25 NOTE — PLAN OF CARE
1430 Doing well today expressive aphasia . No headaches nausea . Per neuro surgery ok to go home. Updated Neuro intensive and Hospitalitis. AVS reviewed . Meds and follow up care .  at bedside . Discharged home .

## 2024-09-25 NOTE — DISCHARGE PLANNING
Patient follow-up appointment information:     Visit Type: ivonne   Date of TIA/Stroke: 9/13/2024  Type of Stroke: Ischemic   Patient to follow-up: 4 weeks   OP Neurologist: Dr. Issa   New patient to neurology service: yes   Anticipated discharge (if known): 9/25/2024  Discharge disposition (if known): Home     Please contact patient or spouse Manjinder Kevin at [phone] to schedule stroke follow-up appointment.     RN Stroke Navigator team  [phone]

## 2024-09-25 NOTE — PAYOR COMM NOTE
--------------  CONTINUED STAY REVIEW    Payor: [employer]  Subscriber #:  [id number]  Authorization Number: [id number]    Admit date: 9/12/24  Admit time:  6:09 PM    REVIEW DOCUMENTATION:      9/23 IM   Assessment/Plan:      Active Problems:    Cerebrovascular accident (HCC)    Moyamoya    Moyamoya disease    Aphasia    Facial droop    Hypokalemia    Anemia    Atonic constipation    Hyponatremia        Patient is a 54 year old female with PMH sig for ADHD who presented for elective craniotomy for moyamoya disease.  She is status post EDAS.     Recent CVA  Moyamoya disease  Status post EDAS  - Neurochecks per protocol   - Pain management as needed  - PT/OT/speech evaluation-stable  - Keppra 500 mgTwice dailyx 7 days postsurgery  - Neurosurgery and neurointensivist following. Plan to transfer per neurosurgery   -asa, statin   -amantadine added by neurology for neurostimulant effect  - midodrine and florinef for BP management given low BP with neuro sx. Pressors only if needed      R sided facial droop-improved  R sided weakness- improved  - frequent neurochecks   - adequate BP control with goal normotensive.   - Speech eval-diet advanced  - MRI positive      Bradycardia  - noted on tele, patient asymptomatic  - pt has \"erratic HR\" per family, likely high in the setting of adderall  - obtain EKG-reviewed, no blocks noted  - telemetry     ADHD  - Recommend resuming home Adderall, might assist with improved blood pressure and heart rate     Prophylaxis:   DVT with scd, heparin subcu  Lines/Monitors:      Dispo: CCU  Code status: Full     Dispo: no discharge     Geoff Vega MD  [facility]- Hospitalist   [phone]      Subjective:      Patient still having difficulties with her speech. No chest pain, palpitations, shortness of breath, cough, nausea, vomiting, abdominal pain      OBJECTIVE:     Blood pressure 159/86, pulse 69, temperature 97.7 °F (36.5 °C), temperature source Temporal, resp. rate 12,  weight 125 lb 3.5 oz (56.8 kg), SpO2 96%, not currently breastfeeding.     Temp:  [96.6 °F (35.9 °C)-98.2 °F (36.8 °C)] 97.7 °F (36.5 °C)  Pulse:  [62-87] 69  Resp:  [10-28] 12  BP: (107-183)/() 159/86  SpO2:  [90 %-100 %] 96 %        Intake/Output:     Intake/Output Summary (Last 24 hours) at 9/23/2024 2131  Last data filed at 9/23/2024 2000      Gross per 24 hour   Intake 1468.75 ml   Output 1975 ml   Net -506.25 ml              Last 3 Weights   09/23/24 0700 125 lb 3.5 oz (56.8 kg)   09/22/24 0400 123 lb 3.8 oz (55.9 kg)   09/21/24 0600 125 lb 7.1 oz (56.9 kg)   09/18/24 0800 126 lb 5.2 oz (57.3 kg)   09/15/24 0400 133 lb 2.5 oz (60.4 kg)   09/14/24 0444 132 lb 11.5 oz (60.2 kg)   09/12/24 1944 134 lb 14.7 oz (61.2 kg)   09/12/24 1926 134 lb 14.7 oz (61.2 kg)   09/22/24 1040 123 lb 3.8 oz (55.9 kg)   09/04/24 0600 134 lb 3.2 oz (60.9 kg)   08/30/24 0928 132 lb (59.9 kg)         Exam   Gen: No acute distress, alert and oriented x3, no new focal neurologic deficits, tired  Heent: Surgical staples intact, clean and dry, drain has been removed   pulm: Lungs clear, normal respiratory effort, no crackles, no wheezing  CV: Heart with regular rate and rhythm, no peripheral edema, no gross murmurs  Abd: Abdomen soft, nontender, nondistended, bowel sounds present  MSK: Full range of motion in extremities   Skin: no new rashes or lesions  Neuro: AO*3, right-sided facial droop, otherwise, neuro exam is grossly intact, delayed speech   Psyc: Depressed mood        Data Review:       Labs:                Recent Labs   Lab 09/17/24  0412 09/18/24  0438 09/19/24  0528 09/20/24  0431 09/21/24  0514 09/22/24  0445 09/23/24  0508   RBC 3.46*   < > 3.83 3.96 3.65* 3.76* 3.52*   HGB 10.7*   < > 11.6* 11.9* 11.1* 11.3* 10.8*   HCT 29.3*   < > 33.2* 34.0* 31.5* 32.2* 30.6*   MCV 84.7   < > 86.7 85.9 86.3 85.6 86.9   MCH 30.9   < > 30.3 30.1 30.4 30.1 30.7   MCHC 36.5   < > 34.9 35.0 35.2 35.1 35.3   RDW 11.9   < > 12.2 12.2 12.3  12.2 12.4   NEPRELIM 3.32  --  2.79 3.07  --   --   --    WBC 6.2   < > 4.3 4.5 4.7 5.0 5.4   .0   < > 271.0 262.0 209.0 246.0 229.0    < > = values in this interval not displayed.                  Recent Labs   Lab 09/21/24  0514 09/22/24  0445 09/23/24  0508   GLU 90 81 87   BUN 10 10 10   CREATSERUM 0.69 0.66 0.65   EGFRCR 104 105 105   CA 9.5 9.5 9.2    135* 135*   K 3.8  3.8 3.7  3.7 4.0  4.0    99 102   CO2 28.0 23.0 25.0         No results for input(s): \"ALT\", \"AST\", \"ALB\", \"AMYLASE\", \"LIPASE\", \"LDH\" in the last 168 hours.     Invalid input(s): \"ALPHOS\", \"TBIL\", \"DBIL\", \"TPROT\"           Imaging:  MRI BRAIN (CPT=70551)     Addendum Date: 9/22/2024    ADDENDUM:  This critical result was discussed with Nurse Farrah at 1627 hours on 9/22/2024. Read back was performed.  Dictated by (CST): Jerod Jean MD on 9/22/2024 at 4:26 PM     Finalized by (CST): Jerod Jean MD on 9/22/2024 at 4:27 PM              Result Date: 9/22/2024  CONCLUSION:  New acute infarction in the mid left MCA territory is noted.  This occupies approximately 1/3 of the left MCA territory.  LOCATION:  [facility]   Dictated by (CST): Jerod Jean MD on 9/22/2024 at 4:08 PM     Finalized by (CST): Jerod Jean MD on 9/22/2024 at 4:13 PM            Meds:      Scheduled Medications    fludrocortisone  0.1 mg Oral Daily    aspirin  325 mg Oral Daily    amantadine  100 mg Oral BID    melatonin  5 mg Oral Nightly    sennosides  8.6 mg Oral Daily    multivitamin with minerals  1 tablet Oral Daily    midodrine  10 mg Oral TID    atorvastatin  40 mg Oral Nightly    heparin  5,000 Units Subcutaneous 2 times per day    vortioxetine  10 mg Oral Nightly    [Transfer Hold] progesterone  100 mg Oral Nightly         Medication Infusions    phenylephrine Stopped (09/23/24 0900)    sodium chloride 75 mL/hr at 09/23/24 1900              PRN Medications     metoclopramide    acetaminophen    magnesium hydroxide     ondansetron    HYDROcodone-acetaminophen **OR** HYDROcodone-acetaminophen    morphINE    [Transfer Hold] bisacodyl    [Transfer Hold] fleet enema                     9/23 Neurology  Reason for Consult: Post op EDAS  Chief Complaint: Recent stroke  ________________________________________________________________     SUBJECTIVE      24 Hour Significant Events: Worsening expressive aphasia noted this AM while sitting up and SBP in low 100s, improved with lying flat and SBP in 160s     OBJECTIVE   VITAL SIGNS:   Temp:  [96.8 °F (36 °C)-98.2 °F (36.8 °C)] 96.8 °F (36 °C)  Pulse:  [62-89] 64  Resp:  [10-27] 12  BP: (107-186)/(62-94) 107/89  SpO2:  [90 %-100 %] 95 %     INTAKE/OUTPUT:  Intake/Output Summary (Last 24 hours) at 9/23/2024 0906  Last data filed at 9/23/2024 0900      Gross per 24 hour   Intake 1400 ml   Output 2375 ml   Net -975 ml      PHYSICAL EXAM:  GENERAL APPEARANCE: Patient resting comfortably in bed, NAD  HEENT: Normocephalic, atraumatic.   LUNGS: Normal respiratory rate and effort   HEART: Regular rate and rhythm   ABDOMEN: Soft. No tenderness, guarding, or rebound.     NEUROLOGIC:    Mental Status:  A&O x self, place, nodding head appropriately to person as well, Follows simple commands, slow mildly dysarthric speech, naming 3/3 and able to repeat half the sentence    Cranial nerves: PERRL.  Visual fields full.  EOMI.  R droop, more movement in face now.  Hearing grossly intact. Tongue midline   Motor: Drift:  RUE pronation; Motor exam is 5 out of 5 in all extremities   Sensation: Intact to light touch bilaterally  Cerebellar: Normal Finger-To-Nose test     LABORATORY DATA:  Last 24 hour labs were reviewed in detail.        Recent Labs   Lab 09/21/24  0514 09/22/24  0445 09/23/24  0508    135* 135*   K 3.8  3.8 3.7  3.7 4.0  4.0    99 102   CO2 28.0 23.0 25.0   GLU 90 81 87   BUN 10 10 10   CREATSERUM 0.69 0.66 0.65            Recent Labs   Lab 09/21/24  0514 09/22/24  0445  09/23/24  0508   WBC 4.7 5.0 5.4   HGB 11.1* 11.3* 10.8*   .0 246.0 229.0      No results for input(s): \"TOTALPROTEIN\", \"ALBUMIN\", \"ALT\", \"AST\" in the last 168 hours.     Invalid input(s): \"TOTALBILIRUBIN\", \"ALKPHOSPHATE\"          Recent Labs   Lab 09/21/24  0514 09/22/24  0445   MG 2.0 2.0         Scheduled Meds:  Scheduled Medications    aspirin  325 mg Oral Daily    amantadine  100 mg Oral BID    melatonin  5 mg Oral Nightly    sennosides  8.6 mg Oral Daily    multivitamin with minerals  1 tablet Oral Daily    midodrine  10 mg Oral TID    atorvastatin  40 mg Oral Nightly    heparin  5,000 Units Subcutaneous 2 times per day    vortioxetine  10 mg Oral Nightly    [Transfer Hold] progesterone  100 mg Oral Nightly         Continuous Infusions:  Medication Infusions    phenylephrine           PRN Meds:  PRN Medications     metoclopramide    acetaminophen    magnesium hydroxide    ondansetron    HYDROcodone-acetaminophen **OR** HYDROcodone-acetaminophen    morphINE    [Transfer Hold] bisacodyl    [Transfer Hold] fleet enema        Radiology:    MRI BRAIN (CPT=70551)     Addendum Date: 9/22/2024    ADDENDUM:  This critical result was discussed with Nurse Farrah at 1627 hours on 9/22/2024. Read back was performed.  Dictated by (CST): Jerod Jean MD on 9/22/2024 at 4:26 PM     Finalized by (CST): Jerod Jean MD on 9/22/2024 at 4:27 PM              Result Date: 9/22/2024  CONCLUSION:  New acute infarction in the mid left MCA territory is noted.  This occupies approximately 1/3 of the left MCA territory.  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      Neurological:  Shelby Shelby Disease complicated by left hemispheric stroke (9/3-9/5)  S/P craniotomy for left encephaloduroarteriosynangiosis         - Post op CT w/ expected post op changes          - C/b post op worsening of aphasia and droop, repeat CT stable          - EEG 9/14 with no focal, lateralized or generalized epileptiform discharges or seizures noted, ok to dc         - S/p Keppra 500 mg BID x7 days          - Continue asa and statin                - Rpt mri brain 9/22 with new AIS in L mca territory, will d/w Neurosurg possible transition to dapt for secondary stroke prevention         - SBP goal now 150-180 given worsening speech at lower SBP, continue midodrine tid and vasopressors prn         - PT/OT evals         - Melatonin nightly to help with sleep.          - Added amantadine as well for neurostimulant properties      Cardiovascular:         - SBP goal as noted above          - Hold home medications     Pulmonary: - Saturating well on RA, encourage IS      Renal:   Mild hyponatremia        - Monitor I&Os                 -Volume down, start IVFs         -Daily BMP      Gastrointestinal:  Nutrition: - Gen diet      Atonic constipation  - Bowel regimen scheduled 9/17, had BM later in evening, continue current regimen      Infectious Disease: - No leukocytosis, afebrile, continue to monitor      Heme/Onc   Anemia - Hb goal > 7, daily CBC     Endocrine: - Accuchecks q6 with SSI prn            Checklist         - Lines: PIVs         - DVT Prophylaxis: SCDs, HSQ         - Diet: ADAT         - IVF: -          - Electrolytes: Replete per protocol         - Code Status: FULL     Dispo: CNICU        9/24 IM     Signed       Expand All Collapse All    [facility] Hospitalist Progress Note      CC: Hospital Follow up     PCP: Mildred Steward MD   Assessment/Plan:      Active Problems:    Cerebrovascular accident (HCC)    Moyamoya    Moyamoya disease    Aphasia    Facial droop     Hypokalemia    Anemia    Atonic constipation    Hyponatremia        Patient is a 54 year old female with PMH sig for ADHD who presented for elective craniotomy for moyamoya disease.  She is status post EDAS.     Recent CVA  Moyamoya disease  Status post EDAS  - Neurochecks per protocol   - Pain management as needed  - PT/OT/speech evaluation-stable  - Keppra 500 mgTwice dailyx 7 days postsurgery  - Neurosurgery and neurointensivist following. Plan to transfer per neurosurgery   -asa, statin   -amantadine added by neurology for neurostimulant effect  - midodrine and florinef for BP management given low BP with neuro sx. Pressors only if needed      R sided facial droop-improved  R sided weakness- improved  - frequent neurochecks   - adequate BP control with goal normotensive.   - Speech eval-diet advanced  - MRI positive      Bradycardia  - noted on tele, patient asymptomatic  - pt has \"erratic HR\" per family, likely high in the setting of adderall  - obtain EKG-reviewed, no blocks noted  - telemetry     ADHD  - Recommend resuming home Adderall, might assist with improved blood pressure and heart rate     Prophylaxis:   DVT with scd, heparin subcu  Lines/Monitors:      Dispo: CCU  Code status: Full     Dispo: no discharge     Geoff Vega MD  [facility]- Hospitalist   [phone]      Subjective:      Patient still having difficulties with her speech. No chest pain, palpitations, shortness of breath, cough, nausea, vomiting, abdominal pain      OBJECTIVE:     Blood pressure (!) 161/101, pulse 77, temperature 98.3 °F (36.8 °C), temperature source Temporal, resp. rate 18, weight 125 lb (56.7 kg), SpO2 98%, not currently breastfeeding.     Temp:  [96.6 °F (35.9 °C)-98.3 °F (36.8 °C)] 98.3 °F (36.8 °C)  Pulse:  [] 77  Resp:  [12-28] 18  BP: (107-183)/() 161/101  SpO2:  [91 %-100 %] 98 %        Intake/Output:     Intake/Output Summary (Last 24 hours) at 9/24/2024 0717  Last data filed at 9/24/2024  0600      Gross per 24 hour   Intake 2442.15 ml   Output 2375 ml   Net 67.15 ml              Last 3 Weights   09/24/24 0500 125 lb (56.7 kg)   09/23/24 0700 125 lb 3.5 oz (56.8 kg)   09/22/24 0400 123 lb 3.8 oz (55.9 kg)   09/21/24 0600 125 lb 7.1 oz (56.9 kg)   09/18/24 0800 126 lb 5.2 oz (57.3 kg)   09/15/24 0400 133 lb 2.5 oz (60.4 kg)   09/14/24 0444 132 lb 11.5 oz (60.2 kg)   09/12/24 1944 134 lb 14.7 oz (61.2 kg)   09/12/24 1926 134 lb 14.7 oz (61.2 kg)   09/22/24 1040 123 lb 3.8 oz (55.9 kg)   09/04/24 0600 134 lb 3.2 oz (60.9 kg)   08/30/24 0928 132 lb (59.9 kg)         Exam   Gen: No acute distress, alert and oriented x3, no new focal neurologic deficits, tired  Heent: Surgical staples intact, clean and dry, drain has been removed   pulm: Lungs clear, normal respiratory effort, no crackles, no wheezing  CV: Heart with regular rate and rhythm, no peripheral edema, no gross murmurs  Abd: Abdomen soft, nontender, nondistended, bowel sounds present  MSK: Full range of motion in extremities   Skin: no new rashes or lesions  Neuro: AO*3, right-sided facial droop, otherwise, neuro exam is grossly intact, delayed speech   Psyc: Depressed mood        Data Review:       Labs:               Recent Labs   Lab 09/19/24  0528 09/20/24  0431 09/21/24  0514 09/22/24  0445 09/23/24  0508 09/24/24  0444   RBC 3.83 3.96   < > 3.76* 3.52* 3.56*   HGB 11.6* 11.9*   < > 11.3* 10.8* 10.9*   HCT 33.2* 34.0*   < > 32.2* 30.6* 31.0*   MCV 86.7 85.9   < > 85.6 86.9 87.1   MCH 30.3 30.1   < > 30.1 30.7 30.6   MCHC 34.9 35.0   < > 35.1 35.3 35.2   RDW 12.2 12.2   < > 12.2 12.4 12.3   NEPRELIM 2.79 3.07  --   --   --   --    WBC 4.3 4.5   < > 5.0 5.4 5.6   .0 262.0   < > 246.0 229.0 237.0    < > = values in this interval not displayed.                  Recent Labs   Lab 09/22/24  0445 09/23/24  0508 09/24/24  0444   GLU 81 87 90   BUN 10 10 5*   CREATSERUM 0.66 0.65 0.53*   EGFRCR 105 105 111   CA 9.5 9.2 9.1   * 135* 136    K 3.7  3.7 4.0  4.0 3.3*   CL 99 102 102   CO2 23.0 25.0 24.0                    9/24 Neurology     OBJECTIVE   VITAL SIGNS:   Temp:  [96.6 °F (35.9 °C)-98.3 °F (36.8 °C)] 98 °F (36.7 °C)  Pulse:  [] 70  Resp:  [12-28] 19  BP: (127-183)/() 149/85  SpO2:  [91 %-98 %] 97 %     INTAKE/OUTPUT:  Intake/Output Summary (Last 24 hours) at 9/24/2024 0854  Last data filed at 9/24/2024 0825      Gross per 24 hour   Intake 2642.15 ml   Output 2875 ml   Net -232.85 ml      PHYSICAL EXAM:  GENERAL APPEARANCE: Patient resting comfortably in bed, NAD  HEENT: Normocephalic, atraumatic.   LUNGS: Normal respiratory rate and effort   HEART: Regular rate and rhythm   ABDOMEN: Soft. No tenderness, guarding, or rebound.     NEUROLOGIC:    Mental Status:  A&O x self, place, Follows simple commands, slow mildly dysarthric speech, naming 3/3 and able to repeat 75% of the sentence (improved from yest)    Cranial nerves: PERRL.  Visual fields full.  EOMI.  R droop, more movement in face now.  Hearing grossly intact. 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 (9/3-9/5)  S/P craniotomy for left encephaloduroarteriosynangiosis         - Post op CT w/ expected post op changes          - C/b post op worsening of aphasia and droop, repeat CT stable          - EEG 9/14 with no focal, lateralized or generalized epileptiform discharges or seizures noted, ok to dc         - S/p Keppra 500 mg BID x7 days          - Continue asa and statin                - Rpt mri brain 9/22 with  new AIS in L mca territory, will d/w Neurosurg possible transition to dapt for secondary stroke prevention         - speech and exam improved today, will liberalize -180, continue midodrine tid, inc florinef to 0.2 daily, and vasopressors prn         - PT/OT evals         - Melatonin nightly to help with sleep.          - Added amantadine as well for neurostimulant properties      Cardiovascular:         - SBP goal as noted above          - Hold home medications     Pulmonary: - Saturating well on RA, encourage IS      Renal:   Mild hyponatremia        - Monitor I&Os                 -Volume down, cont IVFs         -Daily BMP      Gastrointestinal:  Nutrition: - Gen diet      Atonic constipation  - Bowel regimen scheduled 9/17, had BM later in evening, continue current regimen      Infectious Disease: - No leukocytosis, afebrile, continue to monitor      Heme/Onc   Anemia - Hb goal > 7, daily CBC     Endocrine: - Accuchecks q6 with SSI prn            Checklist         - Lines: PIVs         - DVT Prophylaxis: SCDs, HSQ         - Diet: ADAT         - IVF: -          - Electrolytes: Replete per protocol         - Code Status: FULL     Dispo: CNICU      MEDICATIONS ADMINISTERED IN LAST 1 DAY:  aspirin chewable tab 81 mg       Date Action Dose Route User    9/25/2024 0855 Given 81 mg Oral Erin He RN          atorvastatin (Lipitor) tab 40 mg       Date Action Dose Route User    9/24/2024 2033 Given 40 mg Oral Alanan Sue RN          clopidogrel (Plavix) tab 75 mg       Date Action Dose Route User    9/25/2024 0856 Given 75 mg Oral Erin He RN    9/24/2024 1630 Given 75 mg Oral Erin He RN          enoxaparin (Lovenox) 40 MG/0.4ML SUBQ injection 40 mg       Date Action Dose Route User    9/25/2024 0856 Given 40 mg Subcutaneous (Left Lower Arm) Erin He RN          fludrocortisone (Florinef) tab 0.2 mg       Date Action Dose Route User    9/25/2024 0901 Given  0.2 mg Oral Erin He RN          melatonin cap/tab 5 mg       Date Action Dose Route User    9/24/2024 2033 Given 5 mg Oral Alanna Sue RN          midodrine (ProAmatine) tab 10 mg       Date Action Dose Route User    9/25/2024 1214 Given 10 mg Oral Erin He RN    9/25/2024 0610 Given 10 mg Oral Alanna Sue RN    9/24/2024 1630 Given 10 mg Oral Erin He RN          ondansetron (Zofran) 4 MG/2ML injection 4 mg       Date Action Dose Route User    9/24/2024 2202 Given 4 mg Intravenous Radha Schulte RN          potassium chloride (Klor-Con M20) tab 40 mEq       Date Action Dose Route User    9/25/2024 1214 Given 40 mEq Oral Erin He RN    9/25/2024 0856 Given 40 mEq Oral Erin He RN          sodium chloride 0.9% infusion       Date Action Dose Route User    9/24/2024 1400 Rate/Dose Verify (none) Intravenous Erin He RN          multivitamin with minerals (Thera M Plus) tab 1 tablet       Date Action Dose Route User    9/25/2024 0901 Given 1 tablet Oral Erin He RN          vortioxetine (Trintellix) tab 10 mg       Date Action Dose Route User    9/24/2024 2034 Given 10 mg Oral Alanna Sue RN            Vitals (last day)       Date/Time Temp Pulse Resp BP SpO2 Weight O2 Device O2 Flow Rate (L/min) [facility]    09/25/24 1100 -- 74 25 160/88 100 % -- -- --     09/25/24 1042 -- 67 20 -- 97 % -- -- --     09/25/24 1000 -- 61 12 158/103 95 % -- None (Room air) --     09/25/24 0900 -- 57 8 149/71 97 % -- -- --     09/25/24 0800 98.4 °F (36.9 °C) 59 18 160/89 97 % -- None (Room air) --     09/25/24 0700 -- 53 16 146/84 95 % -- -- -- MS    09/25/24 0600 -- 70 13 151/90 95 % -- -- -- MS    09/25/24 0500 -- 62 14 135/76 96 % -- -- -- MS    09/25/24 0400 97.4 °F (36.3 °C) 71 16 145/78 96 % 123 lb 6.4 oz (56 kg) None (Room air) -- MS    09/25/24 0300 -- 63 17 166/87 97 % -- -- -- MS    09/25/24 0200 -- 65 17  151/77 93 % -- -- -- MS    09/25/24 0100 -- 60 15 144/71 95 % -- -- -- MS    09/25/24 0000 98.4 °F (36.9 °C) 57 15 137/88 97 % -- None (Room air) -- MS    09/24/24 2300 -- 65 19 140/77 97 % -- -- -- MS    09/24/24 2200 -- 81 18 173/67 95 % -- -- -- MS    09/24/24 2100 -- 59 17 166/80 97 % -- -- -- MS    09/24/24 2000 97.2 °F (36.2 °C) 63 17 160/77 97 % -- None (Room air) -- MS    09/24/24 1900 -- 61 16 146/97 -- -- -- -- SG    09/24/24 1800 -- 67 14 162/74 96 % -- -- -- SG    09/24/24 1700 -- 57 17 177/90 97 % -- None (Room air) -- SG    09/24/24 1630 -- 65 15 140/74 96 % -- -- -- SG    09/24/24 1620 -- 74 21 140/74 96 % -- -- -- SG    09/24/24 1610 -- 64 17 -- 97 % -- -- -- SG    09/24/24 1600 98.1 °F (36.7 °C) 65 16 131/81 97 % -- None (Room air) -- SG    09/24/24 1550 -- -- -- -- 98 % -- -- -- SG    09/24/24 1540 -- -- -- -- 98 % -- -- -- SG    09/24/24 1515 -- 60 16 151/71 97 % -- -- -- SG    09/24/24 1500 -- 63 15 135/82 99 % -- -- -- SG    09/24/24 1445 -- -- -- -- 98 % -- -- -- SG    09/24/24 1433 -- 66 16 -- 97 % -- -- -- SG    09/24/24 1433 -- -- -- 132/82 -- -- -- --     09/24/24 1400 -- 64 14 138/83 96 % -- None (Room air) -- SG    09/24/24 1300 -- 66 18 160/88 97 % -- -- -- SG    09/24/24 1200 97.1 °F (36.2 °C) 70 19 157/86 97 % -- None (Room air) -- SG    09/24/24 1100 -- 78 19 157/89 97 % -- -- -- SG    09/24/24 1010 -- 70 23 153/82 97 % -- -- -- SG    09/24/24 1000 -- 70 16 122/109 97 % -- None (Room air) -- SG    09/24/24 0900 -- 65 12 156/76 95 % -- -- -- SG    09/24/24 0831 -- 70 19 149/85 96 % -- -- -- SG    09/24/24 0800 98 °F (36.7 °C) 75 20 149/129 97 % -- None (Room air) -- SG    09/24/24 0700 -- 77 18 161/101 98 % -- -- -- MS    09/24/24 0600 -- 74 15 157/84 95 % -- -- -- MS    09/24/24 0500 -- 71 16 148/98 95 % 125 lb (56.7 kg) -- -- MS    09/24/24 0400 98.3 °F (36.8 °C) 70 18 151/89 96 % -- None (Room air) -- MS    09/24/24 0300 -- 82 24 151/88 98 % -- -- -- MS    09/24/24 0200 -- 71 21  156/82 96 % -- -- -- MS    09/24/24 0100 -- 75 19 162/76 97 % -- -- -- MS    09/24/24 0000 97.8 °F (36.6 °C) 73 17 151/85 95 % -- None (Room air) -- MS       09/23/24 2010 -- 87 19 140/84 94 % -- -- -- MS   09/23/24 2000 97.7 °F (36.5 °C) 69 17 183/87 Abnormal  97 % -- None (Room air) -- MS   09/23/24 1900 -- 77 18 175/89 Abnormal  97 % -- None (Room air) -- AT   09/23/24 1800 -- 73 28 Abnormal  151/90 95 % -- None (Room air) -- AT   09/23/24 1700 -- 67 15 143/81 91 % -- None (Room air) -- AT   09/23/24 1600 97.7 °F (36.5 °C) 69 17 153/85 96 % -- None (Room air) -- AT   09/23/24 1500 -- 67 19 144/107 Abnormal  97 % -- -- -- AT   09/23/24 1410 -- 68 20 158/82 96 % -- None (Room air) -- AT   09/23/24 1400 -- 66 16 127/107 Abnormal  94 % -- None (Room air) -- AT   09/23/24 1300 -- 64 19 176/89 Abnormal  94 % -- None (Room air) -- AT   09/23/24 1200 96.9 °F (36.1 °C) 65 13 179/78 Abnormal  97 % -- None (Room air) -- AT   09/23/24 1140 -- 67 12 164/79 Abnormal  95 % -- -- -- AT   09/23/24 1130 -- 75 20 135/60 95 % -- -- -- AT   09/23/24 1100 96.6 °F (35.9 °C) 83 18 165/87 Abnormal  98 % -- -- -- AT   09/23/24 1030 -- 73 17 159/85 96 % -- -- -- AT   09/23/24 1000 -- 71 13 153/85 93 % -- -- -- AT   09/23/24 0900 -- 74 14 131/91 Abnormal  92 % -- -- -- AT            09/22/24 2100 -- 89 14 156/93 Abnormal  99 % -- -- -- MS   09/22/24 2000 97.7 °F (36.5 °C) 70 13 140/84 97 % -- None (Room air) -- MS   09/22/24 1900 -- 68 17 172/81 Abnormal  96 % -- -- --    09/22/24 1800 -- 67 13 186/86 Abnormal  97 % -- -- --    09/22/24 1700 -- 64 14 -- 97 % -- -- --    09/22/24 1600 97.5 °F (36.4 °C) 89 22 148/89 96 % -- None (Room air) --    09/22/24 1400 -- 75 15 161/68 Abnormal  96 % -- -- -- AM   09/22/24 1300 -- 78 13 142/87 98 % -- -- -- AM   09/22/24 1200 97 °F (36.1 °C) 75 14 111/94 Abnormal  99 % -- None (Room air) -- AM   09/22/24 1100 -- 70 21 151/76 99 % -- -- -- AM

## 2024-09-26 NOTE — TELEPHONE ENCOUNTER
Patient's blood pressure is to remain rohan. But Blood pressure is 122 systolic. This morning it was 115 systolic and took Midodrine went up to 133 but after an hour it went down to 122    Patient kind of tired today and not talking as much. No other symptoms. No dizziness/lightheadedness.     Please advise what patient's BP should be at. And if they need any interventions.     Patient has appt with Sheridan tomorrow morning.     From discharge summary yesterday:    Reason for Admission: Moyamoya disease s/p L EDAS     Procedures: CRANIOTOMY FOR A LEFT ENCEPHALODUROARTERIOSYNANGIOSIS (EDAS) INDIRECT BYPASS      Hospital Course: 53 y/o female with PMH of ADHD who presented to the ED on 8/27 with acute slurred speech and language difficulty. MRI demonstrated a left hemisphere focus of infarct. CTA head and neck revealed left ICA terminus steno-occlusive disease consistent with Moyamoya. Patient was started on ASA and Plavix. She was admitted to [facility] on 9/25/24 and underwent L craniotomy for left encephaloduroarteriosynangiosis. She was admitted to the neurocritical care unit post-operatively for close neurological monitoring. She was noted post-op to have aphasia and right sided weakness. She was found to be pressure dependent and started on Evelio gtt to increase BP. She was trialed titrating off the Evelio gtt and found to be symptomatic at lower SBP towards 120s and was started on Midodrine and then Florinef to optimize SBP with improvement in her symptoms. She was resumed on ASA post-op. She had a post-op MRI performed on 9/29/24 that revealed a new mid L MCA infarct. She worked with therapies and was recommended for outpatient speech therapy. She was deemed stable for discharge home on 9/25

## 2024-09-26 NOTE — PAYOR COMM NOTE
--------------  DISCHARGE REVIEW    Payor: [facility]  Subscriber #:  [id number]  Authorization Number: [id number]    Admit date: 24  Admit time:   6:09 PM  Discharge Date: 2024  2:51 PM     Admitting Physician: Cornelio Guzman MD  Attending Physician:  No att. providers found  Primary Care Physician: Mildred Steward MD          Discharge Summary Notes        Discharge Summary signed by Amina Vargas PA at 2024  1:19 PM       Author: Amina Vargas PA Specialty: Physician Assistant Surgical, NEUROSURGERY Author Type: Physician Assistant    Filed: 2024  1:19 PM Date of Service: 2024  8:54 AM Status: Signed    : Amina Vargas PA (Physician Assistant) Cosigner: Sheridan Anand APRN at 2024  8:51 PM         [facility]  Discharge Summary    Isabella Kevin Patient Status:  Inpatient    1970 MRN [id number]   Location [facility] 6NE-A Attending Cornelio Guzman MD   Hosp Day # 13 PCP Mildred Steward MD     Date of Admission: 2024    Date of Discharge: 24    Admitting Diagnosis: MoyaMoya disease  Moyamoya  Moyamoya disease    Discharge Diagnosis: Moyamoya disease   Patient Active Problem List   Diagnosis    Vitamin D deficiency    Primary hypertension    Idiopathic hypersomnia    Hyperglycemia    Speech disturbance, unspecified type    Cerebrovascular accident (HCC)    Moyamoya    Moyamoya disease    Aphasia    Facial droop    Hypokalemia    Anemia    Atonic constipation    Hyponatremia    Moyamoya disease [I67.5]Cerebrovascular accident (CVA) due to other mechanism (HCC) [I63.89]    Reason for Admission: Moyamoya disease s/p L EDAS    Procedures: CRANIOTOMY FOR A LEFT ENCEPHALODUROARTERIOSYNANGIOSIS (EDAS) INDIRECT BYPASS     Hospital Course: 55 y/o female with PMH of ADHD who presented to the ED on  with acute slurred speech and language difficulty. MRI demonstrated a left hemisphere focus of infarct. CTA head and neck  revealed left ICA terminus steno-occlusive disease consistent with Moyamoya. Patient was started on ASA and Plavix. She was admitted to [facility] on 9/25/24 and underwent L craniotomy for left encephaloduroarteriosynangiosis. She was admitted to the neurocritical care unit post-operatively for close neurological monitoring. She was noted post-op to have aphasia and right sided weakness. She was found to be pressure dependent and started on Evelio gtt to increase BP. She was trialed titrating off the Evelio gtt and found to be symptomatic at lower SBP towards 120s and was started on Midodrine and then Florinef to optimize SBP with improvement in her symptoms. She was resumed on ASA post-op. She had a post-op MRI performed on 9/29/24 that revealed a new mid L MCA infarct. She worked with therapies and was recommended for outpatient speech therapy. She was deemed stable for discharge home on 9/25       Complications: None    Discharge Condition: Fair    Disposition: Home or Self Care    Discharge Medications:   Current Discharge Medication List        CONTINUE these medications which have NOT CHANGED    Details   aspirin 325 MG Oral Tab Take 1 tablet (325 mg total) by mouth daily.  Qty: 30 tablet, Refills: 5      estradiol 0.025 MG/24HR Transdermal Patch Biweekly Place 1 patch onto the skin twice a week.      progesterone 100 MG Oral Cap Take 1 capsule (100 mg total) by mouth nightly.      vortioxetine 10 MG Oral Tab Take 1 tablet (10 mg total) by mouth nightly.      atorvastatin 40 MG Oral Tab Take 1 tablet (40 mg total) by mouth nightly.  Qty: 30 tablet, Refills: 2      amphetamine-dextroamphetamine 10 MG Oral Tab Take 1 tablet (10 mg total) by mouth 3 (three) times daily as needed.             Follow up Visits:   Future Appointments   Date Time Provider Department Center   9/27/2024 10:45 AM Sheridan Anand APRN [facility]   9/30/2024  5:15 PM Maryjo Hedrick, SLP  SLP [facility]   10/7/2024  3:45  PM Maryjo Hedrick [facility]   10/14/2024  3:45 PM Maryjo Hedrick [facility]   10/21/2024  3:45 PM Maryjo Hedrick [facility]   10/25/2024  9:30 AM Cornelio Guzman MD [facility]   10/28/2024  5:15 PM Maryjo Hedrick [facility]   11/4/2024  3:45 PM Maryjo Hedrick [facility]   11/11/2024  3:45 PM Maryjo Hedrick [facility]       Patient Instructions:  Activity: Take it easy for next several weeks. Recommend no returning to work until cleared at follow up appointments. No driving while on analgesics.  Monitor for stroke like symptoms. If concerns, recommend laying flat in the bed and calling 911.  Wound Care: Keep incision clean and dry. No ointments or creams to incision site. Use baby shampoo only, no conditioners or hair products until cleared by neurosurgery.     Amina Rosenberg PA-C  [facility] [place]  9/25/2024 10:22 AM      Electronically signed by Sheridan Anand APRN on 9/25/2024  8:51 PM         REVIEWER COMMENTS

## 2024-09-27 NOTE — PROGRESS NOTES
Established patient:  Reason for follow up:   2 week post op, sx 09/13/24   Pt denies any new pain or symptoms     Numeric Rating Scale:       Pain at Present:  0/10

## 2024-09-27 NOTE — PATIENT INSTRUCTIONS
Take Midodrine 7 am, 12-1 pm and 6 pm     Take  Florinef  0.2 mg every day 9 am      3. Measure BP                     Upon awakening; supine, sitting, and standing measurements                  Before going to bed   ( do not take Midodrine if SBP > 150 )          Increasing salt and water intake  Arising slowly (from a supine or seated position) to standing  Sleeping with the head of the bed raised  Avoiding straining/Valsalva  Limiting exposure to hot, humid environments  Using compression garments (waist-high compression stockings and abdominal binders)            Refill policies:    Allow 2-3 business days for refills; controlled substances may take longer.  Contact your pharmacy at least 5 days prior to running out of medication and have them send an electronic request or submit request through the “request refill” option in your [employer] account.  Refills are not addressed on weekends; covering physicians do not authorize routine medications on weekends.  No narcotics or controlled substances are refilled after noon on Fridays or by on call physicians.  By law, narcotics must be electronically prescribed.  A 30 day supply with no refills is the maximum allowed.  If your prescription is due for a refill, you may be due for a follow up appointment.  To best provide you care, patients receiving routine medications need to be seen at least once a year.  Patients receiving narcotic/controlled substance medications need to be seen at least once every 3 months.  In the event that your preferred pharmacy does not have the requested medication in stock (e.g. Backordered), it is your responsibility to find another pharmacy that has the requested medication available.  We will gladly send a new prescription to that pharmacy at your request.    Scheduling Tests:    If your physician has ordered radiology tests such as MRI or CT scans, please contact Central Scheduling at [phone] right away to schedule the  test.  Once scheduled, the [facility] Centralized Referral Team will work with your insurance carrier to obtain pre-certification or prior authorization.  Depending on your insurance carrier, approval may take 3-10 days.  It is highly recommended patients assure they have received an authorization before having a test performed.  If test is done without insurance authorization, patient may be responsible for the entire amount billed.      Precertification and Prior Authorizations:  If your physician has recommended that you have a procedure or additional testing performed the [facility] Centralized Referral Team will contact your insurance carrier to obtain pre-certification or prior authorization.    You are strongly encouraged to contact your insurance carrier to verify that your procedure/test has been approved and is a COVERED benefit.  Although the [facility] Centralized Referral Team does its due diligence, the insurance carrier gives the disclaimer that \"Although the procedure is authorized, this does not guarantee payment.\"    Ultimately the patient is responsible for payment.   Thank you for your understanding in this matter.  Paperwork Completion:  If you require FMLA or disability paperwork for your recovery, please make sure to either drop it off or have it faxed to our office at [phone]. Be sure the form has your name and date of birth on it.  The form will be faxed to our Forms Department and they will complete it for you.  There is a 25$ fee for all forms that need to be filled out.  Please be aware there is a 10-14 day turnaround time.  You will need to sign a release of information (JERAMY) form if your paperwork does not come with one.  You may call the Forms Department with any questions at [phone].  Their fax number is [phone].

## 2024-09-27 NOTE — PATIENT INSTRUCTIONS
Follow up with Dr Guzman in 1 month  Watch for signs of infection  Take meds as recommended by Neuro  Continue aspirin and Plavix  Follow up with Dr Issa 3 months    Refill policies:    Allow 2-3 business days for refills; controlled substances may take longer.  Contact your pharmacy at least 5 days prior to running out of medication and have them send an electronic request or submit request through the “request refill” option in your [employer] account.  Refills are not addressed on weekends; covering physicians do not authorize routine medications on weekends.  No narcotics or controlled substances are refilled after noon on Fridays or by on call physicians.  By law, narcotics must be electronically prescribed.  A 30 day supply with no refills is the maximum allowed.  If your prescription is due for a refill, you may be due for a follow up appointment.  To best provide you care, patients receiving routine medications need to be seen at least once a year.  Patients receiving narcotic/controlled substance medications need to be seen at least once every 3 months.  In the event that your preferred pharmacy does not have the requested medication in stock (e.g. Backordered), it is your responsibility to find another pharmacy that has the requested medication available.  We will gladly send a new prescription to that pharmacy at your request.    Scheduling Tests:    If your physician has ordered radiology tests such as MRI or CT scans, please contact Central Scheduling at [phone] right away to schedule the test.  Once scheduled, the [facility] Centralized Referral Team will work with your insurance carrier to obtain pre-certification or prior authorization.  Depending on your insurance carrier, approval may take 3-10 days.  It is highly recommended patients assure they have received an authorization before having a test performed.  If test is done without insurance authorization, patient may be responsible for the  entire amount billed.      Precertification and Prior Authorizations:  If your physician has recommended that you have a procedure or additional testing performed the [facility] Centralized Referral Team will contact your insurance carrier to obtain pre-certification or prior authorization.    You are strongly encouraged to contact your insurance carrier to verify that your procedure/test has been approved and is a COVERED benefit.  Although the [facility] Centralized Referral Team does its due diligence, the insurance carrier gives the disclaimer that \"Although the procedure is authorized, this does not guarantee payment.\"    Ultimately the patient is responsible for payment.   Thank you for your understanding in this matter.  Paperwork Completion:  If you require FMLA or disability paperwork for your recovery, please make sure to either drop it off or have it faxed to our office at [phone]. Be sure the form has your name and date of birth on it.  The form will be faxed to our Forms Department and they will complete it for you.  There is a 25$ fee for all forms that need to be filled out.  Please be aware there is a 10-14 day turnaround time.  You will need to sign a release of information (JERAMY) form if your paperwork does not come with one.  You may call the Forms Department with any questions at [phone].  Their fax number is [phone].

## 2024-09-27 NOTE — PROGRESS NOTES
[facility]  Neurological Surgery Clinic Note    Isabella Kevin  1/4/1970  [id number]  PCP: Mildred Steward MD    REASON FOR VISIT:  Post Op Wound Check    S/P Left-sided frontotemporal grfokphoz-sqhh-pnmlg-synangiosis (EDAS) indirect bypass on 9/13/24    HISTORY OF PRESENT ILLNESS:  Isabella Kevin is a 54 year old female who originally presented on 8/27/24 with acute slurred speech and language difficulty.  She denied any right sided numbness or weakness.  MRI brain demonstrated a left hemisphere focus of infarct.  CTA head and neck demonstrated left ICA terminus steno-occlusive disease consistent with moyamoya.  She has been started on aspirin and Plavix by neurology.  She has a history of labile blood pressures for which she is on medication. She presented for diagnostic angiogram on 9/3/24. Prior to procedure she was noted to have AMS, slurred speech and HR was 30s with SBP 60s. Symptoms improved with IVF and pressors and angiogram was completed. She then presented on 9/13/24 for a left sided frontotemporal fvtdfbaqj-emki-ilcuq-synangiosis (EDAS) indirect bypass. She was admitted to the neurocritical care unit post-operatively for close neurological monitoring. She was noted post-op to have aphasia and right sided weakness. She was found to be pressure dependent and started on Evelio gtt to increase BP. She was trialed titrating off the Evelio gtt and found to be symptomatic at lower SBP towards 120s and was started on Midodrine and then Florinef to optimize SBP with improvement in her symptoms. She was resumed on ASA post-op. Later, Plavix was started. She had a post-op MRI performed on 9/29/24 that revealed a new mid L MCA infarct. She worked with therapies and was recommended for outpatient speech therapy. She was deemed stable for discharge home on 9/25.     On 9/26/24, patient's spouse called our office with concerns of speech changes and low SBP in the 110s. BP improved  with Florineff and Midodrine. Symptoms were resolved later in the day. Patient was added on to see Dr Issa prior to this appointment and her medications were adjusted and patient was educated on the importance of adequate hydration and other methods like compression stockings. Today, she presents for a wound check.     She is starting SLP on Monday. No concerns for any surgical site infection.     PAST MEDICAL HISTORY:  Past Medical History:    ADHD (attention deficit hyperactivity disorder)    Allergic rhinitis    Idiopathic hypersomnia    Osteoporosis    Seasonal affective disorder (HCC)    Vitamin D deficiency       PAST SURGICAL HISTORY:  Past Surgical History:   Procedure Laterality Date    Ablation  01/01/2011    uterus    Brain surgery  09/13/2024    neuro revascularization       FAMILY HISTORY:  family history includes Hypertension in her brother and mother; stomach cancer in her paternal aunt.    SOCIAL HISTORY:   reports that she has never smoked. She has never used smokeless tobacco. She reports that she does not currently use alcohol after a past usage of about 0.8 standard drinks of alcohol per week. She reports that she does not use drugs.    ALLERGIES:  Allergies   Allergen Reactions    Ciprofloxacin-Hydrocortisone ITCHING and HIVES     Tingling of hands and feet    Ibuprofen SWELLING    Nsaids ANGIOEDEMA    Birch Tar Oil [Bet[place] Alba Oil]      [place]    Modafinil OTHER (SEE COMMENTS)     Pt felt like throat was closing       MEDICATIONS:  Current Outpatient Medications on File Prior to Visit   Medication Sig Dispense Refill    fludrocortisone 0.1 MG Oral Tab Take 2 tablets (0.2 mg total) by mouth daily. 30 tablet 1    aspirin 81 MG Oral Chew Tab Chew 1 tablet (81 mg total) by mouth daily. 30 tablet 5    clopidogrel 75 MG Oral Tab Take 1 tablet (75 mg total) by mouth daily. 30 tablet 5    midodrine 10 MG Oral Tab Take 1 tablet (10 mg total) by mouth in the morning and 1 tablet (10 mg total) at  noon and 1 tablet (10 mg total) in the evening. 30 tablet 1    estradiol 0.025 MG/24HR Transdermal Patch Biweekly Place 1 patch onto the skin twice a week. (Patient not taking: Reported on 9/27/2024)      vortioxetine 10 MG Oral Tab Take 1 tablet (10 mg total) by mouth nightly.      atorvastatin 40 MG Oral Tab Take 1 tablet (40 mg total) by mouth nightly. 30 tablet 2    amphetamine-dextroamphetamine 10 MG Oral Tab Take 1 tablet (10 mg total) by mouth 3 (three) times daily as needed. (Patient not taking: Reported on 9/27/2024)       No current facility-administered medications on file prior to visit.       REVIEW OF SYSTEMS:  A 10-point system was reviewed.  Pertinent positives and negatives are noted in HPI.      PHYSICAL EXAMINATION:  VITAL SIGNS: There were no vitals taken for this visit.    A&Ox3, no acute distress  PERRL, EOMi, right droop  Full strength x 4, no drift  Sensation intact, mild aphasia with slow speech. Naming is intact      ASSESSMENT:  53 yo female with Moyamoya disease s/p left EDAS on 9/13/24  LMCA CVA    Plan:  Follow up with Dr Guzman in 1 month  Watch for signs of infection  Take meds as recommended by Neuro  Continue aspirin and Plavix  Follow up with Dr Issa 3 months  Advance activity as tolerated    We discussed not stopping aspirin or Plavix without contacting our office. We reviewed stroke symptoms and when to go to the ED. The patient was instructed to continue healthy lifestyle habits to include not smoking, managing blood pressure, healthy eating and regular exercise.    All questions were answered and the patient was instructed to call or message with any additional questions or concerns.   ARLENE Matias, CNP  Neurological Surgery  [facility]    Care Time: 30 min including face to face time, chart review, imaging interpretation, and coordination of care

## 2024-09-27 NOTE — PROGRESS NOTES
HPI:    Patient ID: Isabella Kevin is a 54 year old female.  Referring provider: Dr Guzman      Thank you for referring this patient to us. Below is the summary of my evaluation.      SAIDA Kevin is a 54 year old female who presented for hospital follow up for Shelby Shelby s/p Left-sided frontotemporal naolapkzq-qmej-jufqt-synangiosis (EDAS) indirect bypass on 9/13/24 by Dr Guzman.    She initially presented to the hospital 8/27/24 with acute speech and language difficulty. MRI brain demonstrated a left hemisphere focus of infarct.  CTA head and neck demonstrated left ICA terminus steno-occlusive disease consistent with moyamoya.  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 Possible early right sided moyamoya disease with filling of the STEPHANY territory via pial collaterals     Post operatively had episodes of worsening aphasia and facial droop, BP dependent and  started on Midodrine. She continue to have orthostatic hypotension Florinef was added  Repeat mRI brain showed new infarction in the mid left MCA territory. She was discharged home on DAPT    Stable since discharge, BP fluctuates at home. Reports had history of labile hypertension and was on antihypertensive but since the stroke not taking any antihypertensives. She is taking Midodrine and Florinef as instructed. Water intake is inadequate.   She c/o fatigue, interrupted sleep since she is not taking Xywav ( has hx of idiopathic hypersomnia) intermittent right arm involuntary twitch but no limb shaking or weakness.      HISTORY:  Past Medical History:    ADHD (attention deficit hyperactivity disorder)    Allergic rhinitis    Idiopathic hypersomnia    Osteoporosis    Seasonal affective disorder (HCC)    Vitamin D deficiency      Past Surgical History:   Procedure Laterality Date    Ablation  01/01/2011    uterus    Brain  surgery  09/13/2024    neuro revascularization      Family History   Problem Relation Age of Onset    Hypertension Mother     Hypertension Brother     Other (stomach cancer) Paternal Aunt       Social History     Socioeconomic History    Marital status:    Tobacco Use    Smoking status: Never    Smokeless tobacco: Never   Vaping Use    Vaping status: Never Used   Substance and Sexual Activity    Alcohol use: Not Currently     Alcohol/week: 0.8 standard drinks of alcohol     Types: 1 drink(s) per week    Drug use: No    Sexual activity: Yes     Partners: Male     Birth control/protection: Surgical     Comment: ablation    Other Topics Concern    Caffeine Concern Yes     Comment: tea    Stress Concern No    Weight Concern No    Special Diet No    Exercise No    Seat Belt Yes     Social Determinants of Health     Food Insecurity: No Food Insecurity (9/12/2024)    Food Insecurity     Food Insecurity: Never true   Transportation Needs: No Transportation Needs (9/12/2024)    Transportation Needs     Lack of Transportation: No   Housing Stability: Low Risk  (9/12/2024)    Housing Stability     Housing Instability: No        Review of Systems   Constitutional: Negative.    HENT: Negative.     Eyes: Negative.    Respiratory: Negative.     Cardiovascular: Negative.    Gastrointestinal: Negative.    Endocrine: Negative.    Genitourinary: Negative.    Musculoskeletal: Negative.    Skin: Negative.    Allergic/Immunologic: Negative.    Neurological:  Positive for facial asymmetry and speech difficulty. Negative for dizziness, weakness and headaches.   Hematological: Negative.    Psychiatric/Behavioral: Negative.     All other systems reviewed and are negative.           Current Outpatient Medications   Medication Sig Dispense Refill    XYWAV 500 MG/ML Oral Solution Take 180 mL by mouth at bedtime.      cholecalciferol 50 MCG (2000 UT) Oral Cap Take 1 capsule (2,000 Units total) by mouth daily.      fludrocortisone 0.1 MG  Oral Tab Take 2 tablets (0.2 mg total) by mouth daily. 30 tablet 1    aspirin 81 MG Oral Chew Tab Chew 1 tablet (81 mg total) by mouth daily. 30 tablet 5    clopidogrel 75 MG Oral Tab Take 1 tablet (75 mg total) by mouth daily. 30 tablet 5    midodrine 10 MG Oral Tab Take 1 tablet (10 mg total) by mouth in the morning and 1 tablet (10 mg total) at noon and 1 tablet (10 mg total) in the evening. 30 tablet 1    progesterone 100 MG Oral Cap Take 1 capsule (100 mg total) by mouth nightly.      vortioxetine 10 MG Oral Tab Take 1 tablet (10 mg total) by mouth nightly.      atorvastatin 40 MG Oral Tab Take 1 tablet (40 mg total) by mouth nightly. 30 tablet 2    fluticasone propionate 50 MCG/ACT Nasal Suspension 2 sprays by Nasal route daily as needed. (Patient not taking: Reported on 9/27/2024)      estradiol 0.025 MG/24HR Transdermal Patch Biweekly Place 1 patch onto the skin twice a week. (Patient not taking: Reported on 9/27/2024)      amphetamine-dextroamphetamine 10 MG Oral Tab Take 1 tablet (10 mg total) by mouth 3 (three) times daily as needed. (Patient not taking: Reported on 9/27/2024)       Allergies:  Allergies   Allergen Reactions    Ciprofloxacin-Hydrocortisone ITCHING and HIVES     Tingling of hands and feet    Ibuprofen SWELLING    Nsaids ANGIOEDEMA    [place] Tar Oil [[place] Oil]      [place]    Modafinil OTHER (SEE COMMENTS)     Pt felt like throat was closing     PHYSICAL EXAM:   Physical Exam    Blood pressure 120/70, pulse 65, weight 123 lb (55.8 kg), SpO2 99%, not currently breastfeeding.  General Appearance: Well nourished, well developed, no apparent distress.     HEENT: Normocephalic and atraumatic. Normal sclera. Moist mucus membrane  Neck: Normal range of motion. Neck supple.  Cardiovascular: Normal rate, regular rhythm and normal heart sounds.    Pulmonary/Chest: Effort normal and breath sounds normal.   Abdominal: Soft. Bowel sounds are normal.   Skin: dry, clean and intact  Ext:  peripheral pulses present  Psych: normal mood and affect    Neurological:  Patient is awake, alert and oriented to person, place and time   Mild expressive aphasia and dysarthia. Intact comprehension and naming    Cranial Nerves:   II: Visual acuity: normal   Visual fields: normal  III: Pupils: equal, round, reactive to light  III,IV,VI: Extra Ocular Movements: intact  V: Facial sensation: intact  VII: Facial strength: mild right facial weakness  VIII: Hearing: intact  IX: Palate: intact  XI: Shoulder shrug: intact  XII: Tongue movement: normal    Motor: No drift Strength is  5 out of 5 in all extremities bilaterally.  Sensory: Sensory examination is normal to light touch and pinprick   Coordination: Intact  Gait: Normal casual gait         Office Visit from 9/27/2024 in [facility]    9/27/2024    1025   NIH Stroke Scale     Interval Other   Level of Consciousness Alert   LOC Questions Answers both questions   LOC Commands Performs both tasks   Best Gaze Normal   Visual No visual loss   Facial Palsy Minor paralysis   Motor Arm, Left No drift   Motor Arm, Right No drift   Motor Leg, Left No drift   Motor Leg, Right No drift   Limb Ataxia Absent   Sensory Normal   Best Language Mild-to-moderate aphasia   Dysarthria Normal   Extinction/Inattention No abnormality   Total 2     MRS_ 1    TESTS/IMAGING:           MRI brain: 9/22/2024                   Impression   CONCLUSION:  New acute infarction in the mid left MCA territory is noted.  This occupies approximately 1/3 of the left MCA territory.           MRI brain 8/27/2024                  Impression   CONCLUSION:  Abnormal brain MRI.  There is a 15 mm focus of restricted diffusion within the posterior left frontal lobe.  There are smaller scattered punctate foci of restriction abnormality within the left central semiovale.  These imaging findings are  suspicious for a 15 mm acute infarct within the posterior left frontal  lobe with scattered punctate subacute infarcts within the parasagittal left frontal lobe.  These findings are within the watershed territory of the left middle cerebral artery.  A CTA   of the head and neck is recommended for further assessment.  A demyelinating process cannot be entirely excluded and continued clinical and imaging follow-up is recommended for further assessment.                CTA head and neck    Impression   CONCLUSION:       1. There are stable scattered areas of hypoattenuation in the left frontal lobe there are better characterized on the recent MRI of the brain and suspicious for acute to subacute infarcts.  The largest of these measures up to 16 mm.  Clinical correlation   and continued follow-up is suggested.  No evidence of hemorrhagic transformation.     2. 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  There is asymmetric diminutive caliber of the left internal carotid artery branches.  The left middle cerebral artery branches are asymmetrically diminutive when compared to the   right which is also likely on developmental basis.  There is marked hyperplasia of bilateral anterior cerebral artery A1 segments with markedly diminutive and dysplastic anterior cerebral artery branches the remainder of the territory period there are  wished of small vessels along the bilateral carotid termini.  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  The overall findings likely fall within the spectrum of moyamoya disease, with other etiologies not entirely excluded.  Clinical correlation  recommended.     3. No evidence of occlusion, dissection, or flow-limiting stenosis in the cervical vertebral or carotid arteries. No evidence of hemodynamically significant carotid stenosis by NASCET criteria.                 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 Cerebrovascular accident (CVA) due to occlusion of left middle cerebral artery (HCC)  I63.512        3. 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 Shelby Shelby disease  3. Orthostatic hypotension      Plan  Continue Aspirin 81 mg and Clopidrogrel 75 mg daily  Continue midodrine 10 mg 3 times daily ( 7 am, 12-1 pm and 6 pm)  Continue Florinef 0.2 mg daily at 9 am    Hold Xywav ( gamma hydroxy butyrate) given recent cranial surgery   Try Melatonin for sleep  No Hormonal supplements. Ok to use estrogen topical cream    Educated on signs/symptoms of stroke/TIA and non-pharmacological measures for OH     ?Increasing salt and water intake  ?Arising slowly (from a supine or seated position) to standing  ?Sleeping with the head of the bed raised  ?Avoiding straining/Valsalva  ?Limiting exposure to hot, humid environments  ?Using compression garments (waist-high compression stockings and abdominal binders)      Plan discussed with [facility]      Thank you for allowing us to participate in your patient's care.      I spent 60 minutes on the day of the encounter related to this visit: Preparing to see the patient (e.g. review of tests), Obtaining and/or reviewing separately obtained history, Performing a medically appropriate examination and/or evaluation, Counseling and educating the patient/family/caregiver, Ordering medications, tests, or procedures and Documenting clinical information in the electronic health record, not including separately reported activities or procedures.          Davis Issa MD   On license of [facility]      This note was prepared using Dragon Medical voice recognition dictation software. As a result errors may occur. When identified these errors have been corrected. While every attempt is made to correct errors during dictation discrepancies may still exist          Meds This Visit:  Requested Prescriptions      No prescriptions requested or ordered in this encounter       Imaging & Referrals:  None     ID#[id number]

## 2024-09-30 NOTE — PROGRESS NOTES
ADULT SPEECH/LANGUAGE EVALUATION:     Diagnosis:   Unspecified speech disturbances [R47.9], Cerebral infarction, unspecified [I63.9]       Referring Provider: Daniel Brady  Date of Evaluation:    9/30/2024    Precautions:   CVA Next MD visit:   none scheduled  Date of Surgery: n/a     Speech-language evaluation completed per MD order. Patient arrived on time accompanied by , Manjinder. Patient participated actively in assessment tasks.    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 Following EDAS, patient presented with aphasia and dysarthria. Patient initially presented to [facility] on 8/27/2024 with c/o speech and language difficulty and was found to have a left hemisphere focus of infarct on MRI. CTA head and neck demonstrated left ICA terminus steno-occlusive disease consistent with moyamoya. She presented for diagnostic angiogram on 9/3/24. Prior to procedure she was noted to have AMS, slurred speech and HR in 30s with SBP 60s. Symptoms improved with IVF and pressors, and angiogram was completed. She then presented to [facility] on 9/13/24 for a left sided frontotemporal EDAS indirect bypass. She was admitted to the neurocritical care unit post-operatively for close neurological monitoring. She was noted post-op to have aphasia and right sided weakness. Patient was discharged home on 9/25/2024. Patient now reports persistent language deficits. Patient denies difficulty swallowing at this time. Patient is unsure of persistent speech deficit d/t reduced spoken language and phonemic paraphasias. Patient now presents to [facility] for evaluation of expressive and receptive language and motor speech.  Incident/Injury: CVA, EDAS indirect bypass surgery  Pain: Patient  did not report pain during evaluation session.  Hospital/Rehab course: IP speech therapy August and September 2024  Current functional limitations include difficulty communicating effectively across all environments.   Ishajhoana Garza describes prior level of function as WFL. Patient is a physical therapist. Pt goals include returning to prior level of communication.  Past medical history was reviewed with Isabella Garza. Significant findings include: Moyamoya disease, ADHD, allergic rhinitis, osteoporosis, seasonal affective disorder, Vitamin D deficiency, idiopathic hypersomnia    Medications:   Current Outpatient Medications on File Prior to Visit   Medication Sig Dispense Refill    XYWAV 500 MG/ML Oral Solution Take 180 mL by mouth at bedtime.      cholecalciferol 50 MCG (2000 UT) Oral Cap Take 1 capsule (2,000 Units total) by mouth daily.      fludrocortisone 0.1 MG Oral Tab Take 2 tablets (0.2 mg total) by mouth daily. 30 tablet 1    aspirin 81 MG Oral Chew Tab Chew 1 tablet (81 mg total) by mouth daily. 30 tablet 5    clopidogrel 75 MG Oral Tab Take 1 tablet (75 mg total) by mouth daily. 30 tablet 5    midodrine 10 MG Oral Tab Take 1 tablet (10 mg total) by mouth in the morning and 1 tablet (10 mg total) at noon and 1 tablet (10 mg total) in the evening. 30 tablet 1    vortioxetine 10 MG Oral Tab Take 1 tablet (10 mg total) by mouth nightly.      atorvastatin 40 MG Oral Tab Take 1 tablet (40 mg total) by mouth nightly. 30 tablet 2     Current Facility-Administered Medications on File Prior to Visit   Medication Dose Route Frequency Provider Last Rate Last Admin    [COMPLETED] potassium chloride (Klor-Con M20) tab 40 mEq  40 mEq Oral Q4H Geoff Vega MD   40 mEq at 09/25/24 1214    [COMPLETED] potassium chloride (Klor-Con M20) tab 40 mEq  40 mEq Oral Q4H Geoff Vega MD   40 mEq at 09/24/24 1015    [COMPLETED] fludrocortisone (Florinef) tab 0.1 mg  0.1 mg Oral Once Jackson Stinson MD   0.1 mg at 09/24/24 1018     [COMPLETED] potassium chloride (Klor-Con M20) tab 40 mEq  40 mEq Oral Once Stefani Oden, DO   40 mEq at 24 0659    [COMPLETED] sodium chloride 0.9 % IV bolus 1,000 mL  1,000 mL Intravenous Once Dustin Dillard  mL/hr at 24 1019 1,000 mL at 24 1019    [COMPLETED] potassium chloride (Klor-Con M20) tab 40 mEq  40 mEq Oral Once Marek Gomez, DO   40 mEq at 24 0651    [COMPLETED] potassium chloride 20 mEq/100mL IVPB premix 20 mEq  20 mEq Intravenous Once Stefani Oden, DO 50 mL/hr at 24 0602 20 mEq at 24 0602    [COMPLETED] sodium chloride 0.9 % IV bolus 1,000 mL  1,000 mL Intravenous Once Dustin Dillard MD   Stopped at 24 1145    [COMPLETED] potassium chloride (Klor-Con M20) tab 40 mEq  40 mEq Oral Q4H Dustin Dillard MD   40 mEq at 24 1410    [COMPLETED] lidocaine 1%-EPINEPHrine 1:100,000 (Xylocaine-Epinephrine) injection  10 mL Subcutaneous Once Amina Vargas PA   10 mL at 24 0945    [COMPLETED] levETIRAcetam (Keppra) tab 500 mg  500 mg Oral BID Dustin Dillard MD   500 mg at 24 1728    [COMPLETED] lactated ringers IV bolus 1,000 mL  1,000 mL Intravenous Once Josi Gay APRN 500 mL/hr at 24 2144 1,000 mL at 24 2144    [COMPLETED] potassium chloride 40 mEq in 250mL sodium chloride 0.9% IVPB premix  40 mEq Intravenous Once Cornelio Guzman MD 62.5 mL/hr at 24 0641 40 mEq at 24 0641    [COMPLETED] magnesium sulfate in sterile water for injection 2 g/50mL IVPB premix 2 g  2 g Intravenous Once Cornelio Guzman MD 50 mL/hr at 24 0529 2 g at 24 0529    [COMPLETED] morphINE PF 2 MG/ML injection 2 mg  2 mg Intravenous Once Cornelio Guzman MD   2 mg at 24 1601    [] morphINE PF 2 MG/ML injection             [COMPLETED] sodium chloride 0.9 % IV bolus 500 mL  500 mL Intravenous Once Jackson Stinson  mL/hr at 24 190 500 mL at 24 190    [COMPLETED]  sodium chloride 0.9 % IV bolus 500 mL  500 mL Intravenous Once Ro Phillips, APRN 1,000 mL/hr at 24 500 mL at 24    [COMPLETED] levETIRAcetam (Keppra) 500 mg/5mL injection 1,000 mg  1,000 mg Intravenous Once Ro Phillips APRN   1,000 mg at 24 2030    [COMPLETED] sodium chloride 0.9 % IV bolus 1,000 mL  1,000 mL Intravenous Once Ro Phillips, APRN 1,000 mL/hr at 24 1,000 mL at 24    [COMPLETED] heparin (Porcine) 5000 UNIT/ML injection             [COMPLETED] norepinephrine (Levophed) 4 mg/250mL infusion premix      7.5 mL/hr at 24 0751 2 mcg/min at 24 0751    [COMPLETED] lidocaine (Xylocaine) 1 % injection             [COMPLETED] atropine 0.1 MG/ML injection 0.5 mg  0.5 mg Intravenous Once Sheridan Anand APRN   0.5 mg at 24 0739    [COMPLETED] sodium chloride 0.9 % IV bolus 1,000 mL  1,000 mL Intravenous Once Sheridan Anand APRN   Stopped at 24 0835    [COMPLETED] lidocaine (Xylocaine) 1 % injection             [] lactated ringers IV bolus 500 mL  500 mL Intravenous Once PRN Riaz Phillips MD        [] acetaminophen (Tylenol Extra Strength) tab 1,000 mg  1,000 mg Oral Once PRN Riaz Phillips MD        Or    [] HYDROcodone-acetaminophen (Norco) 5-325 MG per tab 1 tablet  1 tablet Oral Once PRN Riaz Phillips MD        Or    [] HYDROcodone-acetaminophen (Norco) 5-325 MG per tab 2 tablet  2 tablet Oral Once PRN Riaz Phillips MD        [] naloxone (Narcan) 0.4 MG/ML injection 0.08 mg  0.08 mg Intravenous PRN Riaz Phillips MD        [] fentaNYL (Sublimaze) 50 mcg/mL injection 25 mcg  25 mcg Intravenous Q5 Min PRN Riaz Phillips MD        Or    [] fentaNYL (Sublimaze) 50 mcg/mL injection 50 mcg  50 mcg Intravenous Q5 Min PRN Riaz Phillips MD        [] HYDROmorphone (Dilaudid) 1 MG/ML injection 0.2 mg  0.2 mg Intravenous Q5 Min PRN Riaz Phillips MD        Or     [] HYDROmorphone (Dilaudid) 1 MG/ML injection 0.4 mg  0.4 mg Intravenous Q5 Min PRN Riaz Phillips MD        Or    [] HYDROmorphone (Dilaudid) 1 MG/ML injection 0.6 mg  0.6 mg Intravenous Q5 Min PRN Riaz Phillips MD        [COMPLETED] iodixanol (VISIPAQUE) 320 MG/ML injection 200 mL  200 mL Intravenous ONCE PRN Cornelio Guzman MD   100 mL at 24 1120    [] sodium chloride 0.9% infusion   Intravenous Continuous David Rosas MD        [] ondansetron (Zofran) 4 MG/2ML injection 4 mg  4 mg Intravenous Q4H PRN David Rosas MD        [COMPLETED] iopamidol 76% (ISOVUE-370) injection for power injector  75 mL Intravenous ONCE PRN Daniel Brady DO   75 mL at 24 1321    [COMPLETED] clopidogrel (Plavix) tab 75 mg  75 mg Oral Once David Rosas MD   75 mg at 24 0003    [COMPLETED] gadoterate meglumine (Dotarem) 7.5 MMOL/15ML injection 15 mL  15 mL Intravenous ONCE PRN David Rosas MD   12 mL at 24 2313     ASSESSMENT  Isabella Garza presents to speech therapy evaluation with primary c/o speech and language changes s/p CVA and EDAS indirect bypass procedure. The results of the objective tests and measures show significant expressive language deficits and moderately impaired auditory comprehension. Patient  Functional deficits include but are not limited to difficulty communicating effectively within desired contexts. Signs and symptoms are consistent with diagnosis of Broca's aphasia. Further assessment required to determine level of motor speech impairment. Pt and SLP discussed evaluation findings, pathology, POC and HEP.  Pt voiced understanding and performs HEP correctly. Skilled Speech Therapy is medically necessary to address the above impairments and reach functional goals.     OBJECTIVE:   Assessment tools used: Western Aphasia Battery Revised (WAB-R)  APHASIA/LANGUAGE DISORDER:  Severity: Severe  Type: Broca's  Verbal expression: Patient presents with single words,  hesitations, halting speech, significant word finding difficulty, slow speech, and limited yet effective functional phrases.  Auditory Comprehension: Relative strength compared to verbal expression with breakdown of comprehension given multi-step directives. Patient benefits from processing/wait time to increase accuracy with auditory comprehension tasks.  Written Expression: Patient reports limited writing at this time.  Reading Comprehension: Patient reports limited reading at this time.    Patient administered the Western Aphasia Battery- Revised (WAB-R). The WAB-R is an individually administered test designed to evaluate a patient's language function and determine presence/absence of aphasia. The Aphasia Quotient (AQ) of the WAB-R is a core measure of aphasia derived from testing in the following subtests: Spontaneous Speech, Auditory Verbal Comprehension, Repetition, and Word Finding/Naming. Patient has an AQ of 58.5/100 which is below the normal range (score of 98.4/100 is indicative of non-brain damaged control group). The following scores were obtained:      Aphasia Quotient 58.5/100   Spontaneous Speech 10/20   Auditory Verbal Comprehension 6.95/10   Repetition 6.7/10   Naming/Word Finding 5.6/10       ORAL MOTOR MECHANISM  Motor speech not assessed during this evaluation d/t time constraints. Further assessment required.    SPEECH PRODUCTION DEFICITS  Motor speech not assessed during this evaluation d/t time constraints. Further assessment required.    Today's Treatment:  Pt education was provided on exam findings, treatment diagnosis, treatment plan, expectations, and prognosis. Pt was also provided recommendations for further assessment of motor speech.   Patient was instructed in and issued a HEP for: aphasia education    Charges: Lynda x1, [id number]      Total Timed Treatment: 60 min     Total Treatment Time: 60 min     PLAN OF CARE:    Goals: (to be met in 12 visits)   STG 1: Patient to complete further  assessment with additional goals added as appropriate.  STG 2: Patient will expand utterances to 3-4 word phrases within 80% of opportunities given mod verbal and visual cueing.   STG 3: Patient will generate at least 4 agents and 4 patients during verb network strengthening exercises given mod verbal and visual cues.    Frequency / Duration: Patient will be seen for 1-2x/week or a total of 12 visits over a 90 day period. Treatment will include: Speech Therapy    Education or treatment limitation: None  Rehab Potential:good given compliance with HEP and consistent attendance      Patient/Family/Caregiver was advised of these findings, precautions, and treatment options and has agreed to actively participate in planning and for this course of care.    Thank you for your referral. Please co-sign or sign and return this letter via fax as soon as possible to [phone]. If you have any questions, please contact me at Dept: [phone]    Sincerely,  Electronically signed by therapist: MERT Neville  Physician's certification required: Yes  I certify the need for these services furnished under this plan of treatment and while under my care.    X___________________________________________________ Date____________________    Certification From: 9/30/2024  To:12/29/2024

## 2024-10-03 NOTE — PROGRESS NOTES
Diagnosis:   Unspecified speech disturbances [R47.9], Cerebral infarction, unspecified [I63.9]       Referring Provider: Daniel Brady  Date of Evaluation:   9/30/2024    Precautions:   CVA Next MD visit:   none scheduled  Date of Surgery: n/a   Insurance Primary/Secondary: [employer]PO / N/A       # Visits on POC: 12   Total Timed Treatment: 55 min  Date POC Expires: 12/29/2024  Total Treatment time: 55 min  Charges: [id number]   Treatment Number: 2    Subjective: Patient arrived on time to session accompanied by her , Manjinder. Patient participated actively in therapeutic tasks. Patient and 's questions answered during and after session about patient's current level of functioning and prognostic factors.     Pain: Patient did not report pain during session.     Objective:  Goals: (to be met in 12 visits)   STG 1: Patient to complete further assessment with additional goals added as appropriate.  Progress: Motor speech evaluation completed on this date.   ORAL MOTOR MECHANISM  Facial and Oral Structure/Apperance: Intact  Symmetry: R facial droop  Strength: Reduced R labial, lingual strength  Tone: R facial droop  Range of Motion: Reduced R labial retraction, reduced mandibular ROM  Rate of Motion: Slow and deliberate    SPEECH PRODUCTION DEFICITS  Severity: Moderate  Type: Apraxia  Comments: Patient appears to have apraxia c/b inconsistent error patterns, errors in stress and tone, and even and precise alternating motion rates with increased difficulty with sequential motion rates    STG 2: Patient will expand utterances to 3-4 word phrases within 80% of opportunities given mod verbal and visual cueing.  Progress: Patient generated functional word list on this date with the assistance of her , Manjinder.    STG 3: Patient will generate at least 4 agents and 4 patients during verb network strengthening exercises given mod verbal and visual cues.  Progress: Goal not targeted on this date d/t focus on other goal  areas.    New Goals:  STG 4: Patient to use trained compensatory speech strategies (slow rate, over-articulation, syllable segmentation, pausing between words, breath support) with 80% accuracy given mod verbal and visual cues.  STG 5: Patient will produce meaningful phrases with accurate articulation and 1 self-correction or less per phrase given mod verbal and visual cueing.       HEP: functional words/phrases  Education: Prognostic factors    Assessment: Patient presents with  significant expressive language deficits and moderately impaired auditory comprehension. Patient's expressive language is marked by single words, hesitations, halting speech, significant word finding difficulty, slow speech, and limited yet effective functional phrases. Patient also presents with motor speech deficits c/b inconsistent articulation errors, incoordination of respiration and phonation, and increased difficulty with multi-syllable words. Patient's deficits impact her ability to communicate effectively. Patient continues to require skilled intervention to support access to and ability to communicate desired information with functional contexts. Continued intervention is medically necessary.       Plan: Continue speech-language therapy targeting language and motor speech.

## 2024-10-07 NOTE — PROGRESS NOTES
Diagnosis:   Unspecified speech disturbances [R47.9], Cerebral infarction, unspecified [I63.9]       Referring Provider: Daniel Brady  Date of Evaluation:   9/30/2024    Precautions:   CVA Next MD visit:   none scheduled  Date of Surgery: n/a   Insurance Primary/Secondary: [place]PO / N/A       # Visits on POC: 12   Total Timed Treatment: 45 min  Date POC Expires: 12/29/2024  Total Treatment time: 45 min  Charges: [id number]   Treatment Number: 3    Subjective: Patient arrived on time to session accompanied by her , Manjinder. Patient participated actively in therapeutic tasks.     Pain: Patient did not report pain during session.     Objective:  Goals: (to be met in 12 visits)   STG 1: Patient to complete further assessment with additional goals added as appropriate.  Progress: Goal met. See previous notes.    STG 2: Patient will expand utterances to 3-4 word phrases within 80% of opportunities given mod verbal and visual cueing.  Progress: Patient able to expand utterances to 3-4 words within 70% of opportunities given mod-max cueing. Patient able to generate functional 1-2 word phrases independently given wait time and opportunities for practice.    STG 3: Patient will generate at least 4 agents and 4 patients during verb network strengthening exercises given mod verbal and visual cues.  Progress: Goal not targeted on this date d/t focus on other goal areas.    STG 4: Patient to use trained compensatory speech strategies (slow rate, over-articulation, syllable segmentation, pausing between words, breath support) with 80% accuracy given mod verbal and visual cues.  Progress: Patient trained on syllable segmentation and benefited from max verbal cues to support precise articulation.    STG 5: Patient will produce meaningful phrases with accurate articulation and 1 self-correction or less per phrase given mod verbal and visual cueing.   Progress: Patient benefited from modeling and direct instruction to support  accurate articulation of functional phrases.    HEP: functional words/phrases  Education: Prognostic factors    Assessment: Patient presents with  significant expressive language deficits and moderately impaired auditory comprehension. Patient's expressive language is marked by single words, hesitations, halting speech, significant word finding difficulty, slow speech, and limited yet effective functional phrases. Patient also presents with motor speech deficits c/b inconsistent articulation errors, incoordination of respiration and phonation, and increased difficulty with multi-syllable words. Patient's deficits impact her ability to communicate effectively. Patient demonstrates receptiveness to training and cueing for use of compensatory strategies to support expression of self. Patient continues to require mod-max cueing and support. Continued intervention is medically necessary.       Plan: Continue speech-language therapy targeting language and motor speech.

## 2024-10-08 NOTE — TELEPHONE ENCOUNTER
Pt's  is requesting a note stating Pt is currently under Provider's care and is not ready to return to work on 10/15/24. It is anticipated she will return at the end of November. Please contact Manjinder at [phone] if there are any questions. He will p/u the letter when it is ready. Endorsed to RN.

## 2024-10-09 NOTE — PROGRESS NOTES
Diagnosis:   Unspecified speech disturbances [R47.9], Cerebral infarction, unspecified [I63.9]       Referring Provider: Daniel Brady  Date of Evaluation:   9/30/2024    Precautions:   CVA Next MD visit:   none scheduled  Date of Surgery: n/a   Insurance Primary/Secondary: [employer] / N/A       # Visits on POC: 12   Total Timed Treatment: 45 min  Date POC Expires: 12/29/2024  Total Treatment time: 45 min  Charges: [id number]   Treatment Number: 4    Subjective: Patient arrived on time to session accompanied by her , Manjinder. Patient participated actively in therapeutic tasks.     Pain: Patient did not report pain during session.     Objective:  Goals: (to be met in 12 visits)   STG 1: Patient to complete further assessment with additional goals added as appropriate.  Progress: Goal met. See previous notes.    STG 2: Patient will expand utterances to 3-4 word phrases within 80% of opportunities given mod verbal and visual cueing.  Progress: Patient able to expand utterances to 3-4 words within 80% of opportunities given mod-max cueing. Patient able to generate functional 1-2 word phrases independently given wait time and opportunities for practice.    STG 3: Patient will generate at least 4 agents and 4 patients during verb network strengthening exercises given mod verbal and visual cues.  Progress: Patient able to generate 3 agents and 2 patients given mod-max verbal cueing.     STG 4: Patient to use trained compensatory speech strategies (slow rate, over-articulation, syllable segmentation, pausing between words, breath support) with 80% accuracy given mod verbal and visual cues.  Progress: Patient utilize over-articulation strategies given mod cueing.     STG 5: Patient will produce meaningful phrases with accurate articulation and 1 self-correction or less per phrase given mod verbal and visual cueing.   Progress: Patient benefited from modeling and direct instruction to support accurate articulation of  functional phrases.    HEP: functional words/phrases  Education: Prognostic factors    Assessment: Patient presents with  significant expressive language deficits and moderately impaired auditory comprehension. Patient's expressive language is marked by single words, hesitations, halting speech, significant word finding difficulty, slow speech, and limited yet effective functional phrases. Patient also presents with motor speech deficits c/b inconsistent articulation errors, incoordination of respiration and phonation, and increased difficulty with multi-syllable words. Patient's deficits impact her ability to communicate effectively. Patient demonstrates increased ability to generate utterances and expand given mod-max cueing. Patient continues to require skilled cueing to support expansion of utterances. Continued intervention is medically necessary.      Plan: Continue speech-language therapy targeting language and motor speech.

## 2024-10-14 NOTE — PROGRESS NOTES
Diagnosis:   Unspecified speech disturbances [R47.9], Cerebral infarction, unspecified [I63.9]       Referring Provider: Daniel Brady  Date of Evaluation:   9/30/2024    Precautions:   CVA Next MD visit:   none scheduled  Date of Surgery: n/a   Insurance Primary/Secondary: [place] / N/A       # Visits on POC: 12   Total Timed Treatment: 45 min  Date POC Expires: 12/29/2024  Total Treatment time: 45 min  Charges: [id number]   Treatment Number: 5    Subjective: Patient arrived on time to session accompanied by her , Manjinder. Patient participated actively in therapeutic tasks.     Pain: Patient did not report pain during session.     Objective:  Goals: (to be met in 12 visits)   STG 1: Patient to complete further assessment with additional goals added as appropriate.  Progress: Goal met. See previous notes.    STG 2: Patient will expand utterances to 3-4 word phrases within 80% of opportunities given mod verbal and visual cueing.  Progress: Patient able to expand utterances to 3-4 words within 75% of opportunities given mod cueing.     STG 3: Patient will generate at least 4 agents and 4 patients during verb network strengthening exercises given mod verbal and visual cues.  Progress: Patient able to generate 3 agents and 2 patients given mod-max verbal cueing.     STG 4: Patient to use trained compensatory speech strategies (slow rate, over-articulation, syllable segmentation, pausing between words, breath support) with 80% accuracy given mod verbal and visual cues.  Progress: Patient utilized over-articulation strategies given mod cueing.     STG 5: Patient will produce meaningful phrases with accurate articulation and 1 self-correction or less per phrase given mod verbal and visual cueing.   Progress: Patient benefited from modeling and direct instruction to support accurate articulation of functional phrases.    HEP: functional words/phrases  Education: Prognostic factors    Assessment: Patient presents with   significant expressive language deficits and moderately impaired auditory comprehension. Patient's expressive language is marked by single words, hesitations, halting speech, significant word finding difficulty, slow speech, and limited yet effective functional phrases. Patient also presents with motor speech deficits c/b inconsistent articulation errors, incoordination of respiration and phonation, and increased difficulty with multi-syllable words. Patient's deficits impact her ability to communicate effectively. Patient has demonstrated improved ability to utilize functional phrases and generate sentences. Patient continues to require skilled cueing and patient's  exhibits receptiveness to communication partner training. Continued intervention is medically necessary.      Plan: Continue speech-language therapy targeting language and motor speech.

## 2024-10-15 NOTE — TELEPHONE ENCOUNTER
Refill request received.    Medication: midodrine 10 MG Oral Tab      Date of last refill: 10/05/24 (#30/1)  [place]      Last office visit: 9/27/24  Due back to clinic per last office note:  1 month   Date next office visit scheduled:    Future Appointments   Date Time Provider Department Center   10/16/2024  1:15 PM Maryjo Hedrick [facility]   10/21/2024  3:45 PM Maryjo Hedrick [facility]   10/23/2024  1:15 PM Maryjo Hedrick, [facility]   10/25/2024  9:30 AM Cornelio Guzman MD [facility]   10/28/2024  5:15 PM Maryjo Hedrick [facility]   10/30/2024  1:15 PM Maryjo Hedrick, [facility]   11/4/2024  3:45 PM Maryjo Hedrick [facility]   11/11/2024  3:45 PM Maryjo Hedrick, [facility]   12/9/2024  1:10 PM Davis Issa MD [facility]           Last OV note recommendation:    \"ASSESSMENT:  55 yo female with Moyamoya disease s/p left EDAS on 9/13/24  LMCA CVA     Plan:  Follow up with Dr Guzman in 1 month  Watch for signs of infection  Take meds as recommended by Neuro  Continue aspirin and Plavix  Follow up with Dr Issa 3 months  Advance activity as tolerated\"    Routed to Provider.

## 2024-10-16 NOTE — TELEPHONE ENCOUNTER
Patient  is requesting to have clinical staff send  midodrine 10 MG Oral Tab. Patient is down to two tablets of medication.     Patients  would like a call when medication is sent over since patient is low on medication.     Pharmacy: [id number] [place] [address] AT [facility] [address], [phone], [phone] [[id number]]

## 2024-10-16 NOTE — PROGRESS NOTES
Diagnosis:   Unspecified speech disturbances [R47.9], Cerebral infarction, unspecified [I63.9]       Referring Provider: Daniel Brady  Date of Evaluation:   9/30/2024    Precautions:   CVA Next MD visit:   none scheduled  Date of Surgery: n/a   Insurance Primary/Secondary: [place] / N/A       # Visits on POC: 12   Total Timed Treatment: 45 min  Date POC Expires: 12/29/2024  Total Treatment time: 45 min  Charges: [id number]   Treatment Number: 6    Subjective: Patient arrived on time to session accompanied by her , Manjinder. Patient participated actively in therapeutic tasks.     Pain: Patient did not report pain during session.     Objective:  Goals: (to be met in 12 visits)   STG 1: Patient to complete further assessment with additional goals added as appropriate.  Progress: Goal met. See previous notes.    STG 2: Patient will expand utterances to 3-4 word phrases within 80% of opportunities given mod verbal and visual cueing.  Progress: Patient able to expand utterances to 3-4 words within 75% of opportunities given mod cueing.     STG 3: Patient will generate at least 4 agents and 4 patients during verb network strengthening exercises given mod verbal and visual cues.  Progress: Patient able to generate 3 agents and 2 patients given mod-max verbal cueing.     STG 4: Patient to use trained compensatory speech strategies (slow rate, over-articulation, syllable segmentation, pausing between words, breath support) with 80% accuracy given mod verbal and visual cues.  Progress: Patient utilized over-articulation strategies given mod cueing. Patient noted to increase awareness of productions.    STG 5: Patient will produce meaningful phrases with accurate articulation and 1 self-correction or less per phrase given mod verbal and visual cueing.   Progress: Patient benefited from mod cueing to support accurate articulation. Patient noted to increase awareness and ability to self-correct.     HEP: functional  words/phrases  Education: Prognostic factors    Assessment: Patient presents with significant expressive language deficits and moderately impaired auditory comprehension. Patient's expressive language is marked by single words, hesitations, halting speech, significant word finding difficulty, slow speech, and limited yet effective functional phrases. Patient also presents with motor speech deficits c/b inconsistent articulation errors, incoordination of respiration and phonation, and increased difficulty with multi-syllable words. Patient's deficits impact her ability to communicate effectively. Patient demonstrated semantic paraphasias when speaking spontaneously and reading. Patient required verbal and visual cueing to support revisions and increase accuracy of productions. Attempted phonological component analysis, however patient was able to convey that she did not learn English phonetically and this treatment was determined to be inappropriate d/t language difference. Future treatment to focus on lexical-semantic routes. Continued intervention is medically necessary.       Plan: Continue speech-language therapy targeting language and motor speech.

## 2024-10-16 NOTE — TELEPHONE ENCOUNTER
Medication: midodrine 10 mg     Date of last refill: 10/05/2024 (#30/1)  [place]/2024    Clopidogrel is ordered at pharmacy for 6 months    Medication: Fludrocortisone 0.1 mg     Date of last refill: 10/05/2024 (#30/1)  [place] 10[place]/2024    Medication: Clopidogrel 75 mg     Date of last refill: 10/05/2024 (#30/5)  [place]25/2024     Last office visit: 09/27/2024  Due back to clinic per last office note:  3 months  Date next office visit scheduled:    Future Appointments   Date Time Provider Department Center   10/16/2024  1:15 PM Maryjo Hed[facility]   10/21/2024  3:45 PM Maryjo Hedrick [facility]   10/23/2024  1:15 PM Maryjo Hedrick [facility]   10/25/2024  9:30 AM Cornelio Guzman MD [facility]   10/28/2024  5:15 PM Maryjo Hedrick [facility]   10/30/2024  1:15 PM Maryjo Hedrick [facility]   11/4/2024  3:45 PM Maryjo Hedrick [facility]   11/11/2024  3:45 PM Maryjo Hedrick [facility]   12/9/2024  1:10 PM Davis Issa MD [facility]           Last OV note recommendation:    Left MCA stroke due to left carotid terminus stenoocclusive disease   S/p left-sided frontotemporal wnilxjokd-zapg-nydsq-synangiosis (EDAS) indirect bypass on 9/13/24  2. [place] Shelby disease  3. Orthostatic hypotension        Plan  Continue Aspirin 81 mg and Clopidrogrel 75 mg daily  Continue midodrine 10 mg 3 times daily ( 7 am, 12-1 pm and 6 pm)  Continue Florinef 0.2 mg daily at 9 am     Hold Xywav ( gamma hydroxy butyrate) given recent cranial surgery   Try Melatonin for sleep  No Hormonal supplements. Ok to use estrogen topical cream

## 2024-10-16 NOTE — TELEPHONE ENCOUNTER
Patient calling to request refill of midodrine 10 MG Oral Tab, fludrocortisone 0.1 MG Oral Tab, and clopidogrel 75 MG Oral Tab   Pharmacy [id number] [place] [address] AT [facility] [address], [phone], [phone]   Patient just have 2 midodrine 10 MG pills for today, patient takes 3 pills at day. Patient  is requesting a month supply.  Patient informed of 48 hour refill policy excluding weekends and holidays.   Informed patient prescription is sent directly to pharmacy.    Further explained patient will not receive a call back once prescription is ready.

## 2024-10-21 NOTE — PROGRESS NOTES
Diagnosis:   Unspecified speech disturbances [R47.9], Cerebral infarction, unspecified [I63.9]       Referring Provider: Daniel Brady  Date of Evaluation:   9/30/2024    Precautions:   CVA Next MD visit:   none scheduled  Date of Surgery: n/a   Insurance Primary/Secondary: [place] / N/[place]       # Visits on POC: 12   Total Timed Treatment: 45 min  Date POC Expires: 12/29/2024  Total Treatment time: 45 min  Charges: [id number]   Treatment Number: 7    Subjective: Patient arrived on time to session accompanied by her , Manjinder. Patient participated actively in therapeutic tasks.     Pain: Patient did not report pain during session.     Objective:  Goals: (to be met in 12 visits)   STG 1: Patient to complete further assessment with additional goals added as appropriate.  Progress: Goal met. See previous notes.    STG 2: Patient will expand utterances to 3-4 word phrases within 80% of opportunities given mod verbal and visual cueing.  Progress: Patient able to expand utterances to 3-4 words within 80% of opportunities given mod cueing.     STG 3: Patient will generate at least 4 agents and 4 patients during verb network strengthening exercises given mod verbal and visual cues.  Progress: Patient able to generate 3 agents and 2 patients given mod-max verbal cueing. Goal reflects last session; goal not targeted d/t focus on other goals.     STG 4: Patient to use trained compensatory speech strategies (slow rate, over-articulation, syllable segmentation, pausing between words, breath support) with 80% accuracy given mod verbal and visual cues.  Progress: Patient utilized over-articulation strategies given mod cueing.    STG 5: Patient will produce meaningful phrases with accurate articulation and 1 self-correction or less per phrase given mod verbal and visual cueing.   Progress: Patient benefited from mod cueing to support accurate articulation. Patient noted to increase awareness and ability to self-correct.     HEP:  functional words/phrases  Education: Prognostic factors    Assessment: Patient presents with significant expressive language deficits and moderately impaired auditory comprehension. Patient's expressive language is marked by single words, hesitations, halting speech, significant word finding difficulty, slow speech, and limited yet effective functional phrases. Patient also presents with motor speech deficits c/b inconsistent articulation errors, incoordination of respiration and phonation, and increased difficulty with multi-syllable words. Patient's deficits impact her ability to communicate effectively. Patient demonstrated semantic paraphasias when speaking spontaneously and reading. Patient required verbal and visual cueing to support revisions, increase accuracy of productions and expansion of utterances to sentence level. Continue treatment to focus on lexical-semantic routes. Continued intervention is medically necessary.       Plan: Continue speech-language therapy targeting language and motor speech.

## 2024-10-23 NOTE — PROGRESS NOTES
Diagnosis:   Unspecified speech disturbances [R47.9], Cerebral infarction, unspecified [I63.9]       Referring Provider: Daniel Brady  Date of Evaluation:   9/30/2024    Precautions:   CVA Next MD visit:   none scheduled  Date of Surgery: n/a   Insurance Primary/Secondary: [place] / N[place]A       # Visits on POC: 12   Total Timed Treatment: 45 min  Date POC Expires: 12/29/2024  Total Treatment time: 45 min  Charges: [id number]   Treatment Number: 8    Subjective: Patient arrived on time to session. Patient participated actively in therapeutic tasks.     Pain: Patient did not report pain during session.     Objective:  Goals: (to be met in 12 visits)   STG 1: Patient to complete further assessment with additional goals added as appropriate.  Progress: Goal met. See previous notes.    STG 2: Patient will expand utterances to 3-4 word phrases within 80% of opportunities given mod verbal and visual cueing.  Progress: Patient able to expand utterances to 3-5 words within 80% of opportunities given mod  and phonemic cueing.     STG 3: Patient will generate at least 4 agents and 4 patients during verb network strengthening exercises given mod verbal and visual cues.  Progress: Patient able to generate 3 agents and 2 patients given mod-max verbal cueing. Goal reflects last session; goal not targeted d/t focus on other goals.     STG 4: Patient to use trained compensatory speech strategies (slow rate, over-articulation, syllable segmentation, pausing between words, breath support) with 80% accuracy given mod verbal and visual cues.  Progress: Patient utilized over-articulation strategies given mod cueing.    STG 5: Patient will produce meaningful phrases with accurate articulation and 1 self-correction or less per phrase given mod verbal and visual cueing.   Progress: Patient benefited from mod cueing to support accurate articulation. Patient noted to increase awareness and ability to self-correct.     HEP: functional  words/phrases  Education: Prognostic factors    Assessment: Patient presents with significant expressive language deficits and moderately impaired auditory comprehension. Patient's expressive language is marked by single words, hesitations, halting speech, significant word finding difficulty, slow speech, and limited yet effective functional phrases. Patient also presents with motor speech deficits c/b inconsistent articulation errors, incoordination of respiration and phonation, and increased difficulty with multi-syllable words. Patient's deficits impact her ability to communicate effectively. Patient required verbal and visual cueing to support revisions, phonemic cueing, and repetition of stimuli to increase accuracy of productions and expansion of utterances to sentence level. Continue treatment to focus on lexical-semantic routes. Continued intervention is medically necessary.       Plan: Continue speech-language therapy targeting language and motor speech.

## 2024-10-23 NOTE — TELEPHONE ENCOUNTER
Message below noted.    Spoke with pt spouse Manjinder regarding if wife can start using hair conditioner.     Pt had Craniotomy 9/13/24. Confirmed with APRN that hair conditioner is okay for pt to use.     Manjinder acknowledged and appreciative of call.     Nothing needed further with this encounter.

## 2024-10-23 NOTE — TELEPHONE ENCOUNTER
Pt's spouse states appt for post op was cx for 10/25/2024 and pt would like to know if can use hair conditioner. Transferred call to nursing.

## 2024-10-28 NOTE — PROGRESS NOTES
Diagnosis:   Unspecified speech disturbances [R47.9], Cerebral infarction, unspecified [I63.9]       Referring Provider: Daniel Brady  Date of Evaluation:   9/30/2024    Precautions:   CVA Next MD visit:   none scheduled  Date of Surgery: n/a   Insurance Primary/Secondary: [place] / N/A       # Visits on POC: 12   Total Timed Treatment: 45 min  Date POC Expires: 12/29/2024  Total Treatment time: 45 min  Charges: [id number]   Treatment Number: 9    Subjective: Patient arrived on time to session accompanied by her spouse Manjinder. Patient participated actively in therapeutic tasks.     Pain: Patient did not report pain during session.     Objective:  Goals: (to be met in 12 visits)   STG 1: Patient to complete further assessment with additional goals added as appropriate.  Progress: Goal met. See previous notes.    STG 2: Patient will expand utterances to 3-4 word phrases within 80% of opportunities given mod verbal and visual cueing.  Progress: Patient able to expand utterances to 3-6 words within 80% of opportunities given mod verbal and phonemic cueing.     STG 3: Patient will generate at least 4 agents and 4 patients during verb network strengthening exercises given mod verbal and visual cues.  Progress: Patient able to generate 3 agents and 2 patients given mod-max verbal cueing. Goal reflects last session; goal not targeted d/t focus on other goals.     STG 4: Patient to use trained compensatory speech strategies (slow rate, over-articulation, syllable segmentation, pausing between words, breath support) with 80% accuracy given mod verbal and visual cues.  Progress: Patient utilized over-articulation and segmentation strategies given mod cueing.    STG 5: Patient will produce meaningful phrases with accurate articulation and 1 self-correction or less per phrase given mod verbal and visual cueing.   Progress: Patient benefited from mod cueing to support accurate articulation. Patient noted to increase awareness and  ability to self-correct.     HEP: functional words/phrases  Education: Prognostic factors    Assessment: Patient presents with significant expressive language deficits and moderately impaired auditory comprehension. Patient's expressive language is marked by single words, hesitations, halting speech, significant word finding difficulty, slow speech, and limited yet effective functional phrases. Patient also presents with motor speech deficits c/b inconsistent articulation errors, incoordination of respiration and phonation, and increased difficulty with multi-syllable words. Patient's deficits impact her ability to communicate effectively. Patient required verbal and visual cueing to support revisions, phonemic cueing, syllable segmentation and repetition of stimuli to increase accuracy of productions and expansion of utterances to sentence level. Continue treatment to focus on lexical-semantic routes. Continued intervention is medically necessary.       Plan: Continue speech-language therapy targeting language and motor speech.

## 2024-10-30 NOTE — PROGRESS NOTES
Diagnosis:   Unspecified speech disturbances [R47.9], Cerebral infarction, unspecified [I63.9]       Referring Provider: Daniel Brady  Date of Evaluation:   9/30/2024    Precautions:   CVA Next MD visit:   none scheduled  Date of Surgery: n/a   Insurance Primary/Secondary: [place] / N/A       # Visits on POC: 12   Total Timed Treatment: 45 min  Date POC Expires: 12/29/2024  Total Treatment time: 45 min  Charges: [id number]   Treatment Number: 10    Subjective: Patient arrived on time to session. Patient participated actively in therapeutic tasks.     Pain: Patient did not report pain during session.     Objective:  Goals: (to be met in 12 visits)   STG 1: Patient to complete further assessment with additional goals added as appropriate.  Progress: Goal met. See previous notes.    STG 2: Patient will expand utterances to 3-4 word phrases within 80% of opportunities given mod verbal and visual cueing.  Progress: Patient able to expand utterances to 3-6 words within 80% of opportunities given mod verbal and phonemic cueing. Goal met; to gauge carryover in next session.      STG 3: Patient will generate at least 4 agents and 4 patients during verb network strengthening exercises given mod verbal and visual cues.  Progress: Patient able to generate 3 agents and 2 patients given mod-max verbal cueing. Goal reflects last session; goal not targeted d/t focus on other goals.     STG 4: Patient to use trained compensatory speech strategies (slow rate, over-articulation, syllable segmentation, pausing between words, breath support) with 80% accuracy given mod verbal and visual cues.  Progress: Patient utilized over-articulation and segmentation strategies given mod cueing.    STG 5: Patient will produce meaningful phrases with accurate articulation and 1 self-correction or less per phrase given mod verbal and visual cueing.   Progress: Patient benefited from mod cueing to support accurate articulation. Patient noted to increase  awareness and ability to self-correct.     HEP: functional words/phrases  Education: Prognostic factors    Assessment: Patient presents with significant expressive language deficits and moderately impaired auditory comprehension. Patient's expressive language is marked by single words, hesitations, halting speech, significant word finding difficulty, slow speech, and limited yet effective functional phrases. Patient also presents with motor speech deficits c/b inconsistent articulation errors, incoordination of respiration and phonation, and increased difficulty with multi-syllable words. Patient's deficits impact her ability to communicate effectively. Patient requires verbal cueing to support revisions, phonemic cueing, syllable segmentation and repetition of stimuli to increase accuracy of productions and expansion of utterances to sentence level. Patient also benefits from opportunities to write and read functional phrases and sentences to aid in other alternative communication modalities. Continue treatment to focus on lexical-semantic routes. Continued intervention is medically necessary.       Plan: Continue speech-language therapy targeting language and motor speech.

## 2024-11-04 NOTE — PROGRESS NOTES
Diagnosis:   Unspecified speech disturbances [R47.9], Cerebral infarction, unspecified [I63.9]       Referring Provider: Daniel Brady  Date of Evaluation:   9/30/2024    Precautions:   CVA Next MD visit:   none scheduled  Date of Surgery: n/a   Insurance Primary/Secondary: [place] / N/A       # Visits on POC: 12   Total Timed Treatment: 45 min  Date POC Expires: 12/29/2024  Total Treatment time: 45 min  Charges: [id number]   Treatment Number: 11    Subjective: Patient arrived on time to session. Patient participated actively in therapeutic tasks.     Pain: Patient did not report pain during session.     Objective:  Goals: (to be met in 12 visits)   STG 1: Patient to complete further assessment with additional goals added as appropriate.  Progress: Goal met. See previous notes.    STG 2: Patient will expand utterances to 3-4 word phrases within 80% of opportunities given mod verbal and visual cueing.  Progress: Patient able to expand utterances to 3-6 words within 80% of opportunities given mod verbal and phonemic cueing. Goal met, see updated goal.   STG 2B: Patient will expand utterances to 4-6 word phrases within 80% of opportunities given mod verbal and visual cueing.      STG 3: Patient will generate at least 4 agents and 4 patients during verb network strengthening exercises given mod verbal and visual cues.  Progress: Patient able to generate 3 agents and 2 patients given mod-max verbal cueing. Goal reflects last session; goal not targeted d/t focus on other goals.     STG 4: Patient to use trained compensatory speech strategies (slow rate, over-articulation, syllable segmentation, pausing between words, breath support) with 80% accuracy given mod verbal and visual cues.  Progress: Patient utilized over-articulation and segmentation strategies given mod cueing.    STG 5: Patient will produce meaningful phrases with accurate articulation and 1 self-correction or less per phrase given mod verbal and visual  cueing.   Progress: Patient benefited from mod cueing to support accurate articulation. Patient noted to increase awareness and ability to self-correct.     HEP: functional words/phrases  Education: Prognostic factors    Assessment: Patient presents with significant expressive language deficits and moderately impaired auditory comprehension. Patient's expressive language is marked by single words, hesitations, halting speech, significant word finding difficulty, slow speech, and limited yet effective functional phrases. Patient also presents with motor speech deficits c/b inconsistent articulation errors, incoordination of respiration and phonation, and increased difficulty with multi-syllable words. Patient's deficits impact her ability to communicate effectively. Patient requires verbal cueing to support revisions, phonemic cueing, syllable segmentation and repetition of stimuli to increase accuracy of productions and expansion of utterances to sentence level. Patient benefits from opportunities to write and read functional phrases and sentences to aid in other alternative communication modalities. Continue treatment to focus on lexical-semantic routes. Continued intervention is medically necessary.       Plan: Continue speech-language therapy targeting language and motor speech.

## 2024-11-06 NOTE — PROGRESS NOTES
Diagnosis:   Unspecified speech disturbances [R47.9], Cerebral infarction, unspecified [I63.9]       Referring Provider: Daniel Brady  Date of Evaluation:   9/30/2024    Precautions:   CVA Next MD visit:   none scheduled  Date of Surgery: n/a   Insurance Primary/Secondary: [place] / N/A       # Visits on POC: 24   Total Timed Treatment: 45 min  Date POC Expires: 2/4/2025  Total Treatment time: 45 min  Charges: [id number]   Treatment Number: 12    Dear Dr. Brady,   Progress Summary  Pt has attended 12 visits in Speech Therapy. This letter is to inform you of Zeenat's progress in speech-language therapy.    Since Zeenat's initial evaluation, she has attended 12 sessions. Therapy sessions have targeted expressive language and multimodal communication. A home exercise program (HEP) addressing expressive language has been provided and completed consistently. During this treatment period, Zeenat has demonstrated improved ability to increase her utterance length during structured tasks and writing given direct training and opportunities for practice. As she has demonstrated significant progress and continues to demonstrate the need of skilled cueing and training, it is recommended that she continue speech therapy to continue progress towards goals and functional communication for daily use.      Subjective: Patient arrived on time to session. Patient participated actively in therapeutic tasks. This treatment was provided by  clinician, Patricia Mg, under the direction and supervision of a licensed speech-language pathologist, who provided consultation regarding skilled judgements, treatment, and assessment of patient care.      Pain: Patient did not report pain during session.     Objective:  Goals: (to be met in 12 additional visits)   STG 1: Patient to complete further assessment with additional goals added as appropriate.  Progress: Goal met. See previous notes.    STG 2B: Patient will expand  utterances to 4-6 word phrases within 80% of opportunities given mod verbal and visual cueing.  Progress: Patient expanded utterances to 4-6 word phrases with 80% accuracy given mod verbal and visual cues.     STG 3: Patient will generate at least 4 agents and 4 patients during verb network strengthening exercises given mod verbal and visual cues.  Progress: Patient able to generate 3 agents and 2 patients given mod-max verbal cueing. Goal reflects previous session; goal not targeted d/t focus on other goals.     STG 4: Patient to use trained compensatory speech strategies (slow rate, over-articulation, syllable segmentation, pausing between words, breath support) with 80% accuracy given mod verbal and visual cues.  Progress: Patient utilized over-articulation and segmentation strategies given mod cueing with 80% accuracy given mod verbal and visual cues.     STG 5: Patient will produce meaningful phrases with accurate articulation and 1 self-correction or less per phrase given mod verbal and visual cueing.   Progress: Patient benefited from mod cueing to support accurate articulation. Patient noted to increase awareness and ability to self-correct. Goal met; to gauge carryover in next session.     HEP: functional words/phrases, constant therapy   Education: Prognostic factors    Assessment: Patient presents with significant expressive language deficits and moderately impaired auditory comprehension. Patient's expressive language is marked by single words, hesitations, halting speech, significant word finding difficulty, slow speech, and limited yet effective functional phrases. Patient also presents with motor speech deficits c/b inconsistent articulation errors, incoordination of respiration and phonation, and increased difficulty with multi-syllable words. Patient's deficits impact her ability to communicate effectively. Patient continues to require verbal cueing to support revisions, phonemic cueing, syllable  segmentation and repetition of stimuli to increase accuracy of productions and expansion of utterances to sentence level. Patient benefits from opportunities to write and read functional phrases and sentences to aid in multimodal communication. Patient has demonstrated progress toward goals and continues to benefit from skilled cueing and lexical-semantic re-training. Continued intervention is medically necessary.       Plan: Continue skilled Speech Therapy 1-2x/week or a total of 12 visits over a 90 day period. Treatment will include: expressive language and motor speech     Patient/Family/Caregiver was advised of these findings, precautions, and treatment options and has agreed to actively participate in planning and for this course of care.    Thank you for your referral. If you have any questions, please contact me at Dept: [phone].    Sincerely,  Electronically signed by therapist: Patricia Mg     Physician's certification required:  Yes  Please co-sign or sign and return this letter via fax as soon as possible to [phone].   I certify the need for these services furnished under this plan of treatment and while under my care.    X___________________________________________________ Date____________________    Certification From: 11/6/2024  To:2/4/2025   Skilled treatment provided by Patricia Mg, SLP  Clinician.  Treatment supervised and note co-signed by aMryjo Hedrick MS CCC-SLP.

## 2024-11-07 NOTE — PROGRESS NOTES
[facility]  Neurological Surgery Clinic Note    Isabella Kevin  1/4/1970  [id number]  PCP: Mildred Steward MD    REASON FOR VISIT:  Symptomatic left-sided moyamoya disease status post left-sided EDAS on 9/13/24     HISTORY OF PRESENT ILLNESS:  Isabella Kevin is a 54 year old female who originally presented on 8/27/24 with acute slurred speech and language difficulty.  She denied any right sided numbness or weakness.  MRI brain demonstrated a left hemisphere focus of infarct.  CTA head and neck demonstrated left ICA terminus steno-occlusive disease consistent with moyamoya.  She has been started on aspirin and Plavix by neurology.  She has a history of labile blood pressures for which she is on medication. She presented for diagnostic angiogram on 9/3/24. Prior to procedure she was noted to have AMS, slurred speech and HR was 30s with SBP 60s. Symptoms improved with IVF and pressors and angiogram was completed. She then presented on 9/13/24 for a left sided frontotemporal thccldync-opxy-ywszzew-synangiosis (EDAS) indirect bypass on 9/13/24. She was admitted to the neurocritical care unit post-operatively for close neurological monitoring. She was noted post-op to have aphasia and right sided weakness. She was found to be pressure dependent and started on Evelio gtt to increase BP. She was trialed titrating off the Evelio gtt and found to be symptomatic at lower SBP towards 120s and was started on Midodrine and then Florinef to optimize SBP with improvement in her symptoms. She was resumed on ASA post-op. Later, Plavix was started. She had a post-op MRI performed on 9/29/24 that revealed a new mid L MCA infarct. She worked with therapies and was recommended for outpatient speech therapy. She was deemed stable for discharge home on 9/25.      On 9/26/24, patient's spouse called our office with concerns of speech changes and low SBP in the 110s. BP improved with  Florineff and Midodrine. Symptoms were resolved later in the day. Patient was added on to see Dr Issa prior to this appointment and her medications were adjusted and patient was educated on the importance of adequate hydration and other methods like compression stockings. Today, she presents for a wound check.     Interval history 11/7/2024  She reports continued language difficulty since surgery, although this has been slowly improving.  She has been working with speech-language pathology for speech therapy.  She reports persistent left-sided headaches that have been slightly worsening recently.  She continues on aspirin daily.  She was recently out hiking and reports some mild right sided leg weakness.  She has some stable right lower face weakness.    PAST MEDICAL HISTORY:  Past Medical History:    ADHD (attention deficit hyperactivity disorder)    Allergic rhinitis    Idiopathic hypersomnia    Osteoporosis    Seasonal affective disorder (HCC)    Vitamin D deficiency       PAST SURGICAL HISTORY:  Past Surgical History:   Procedure Laterality Date    Ablation  01/01/2011    uterus    Brain surgery  09/13/2024    neuro revascularization    Other surgical history  09/13/2024    CRANIOTOMY FOR A LEFT ENCEPHALODUROARTERIOSYNANGIOSIS (EDAS) INDIRECT BYPASS       FAMILY HISTORY:  family history includes Hypertension in her brother and mother; stomach cancer in her paternal aunt.    SOCIAL HISTORY:   reports that she has never smoked. She has never used smokeless tobacco. She reports that she does not currently use alcohol. She reports that she does not use drugs.    ALLERGIES:  Allergies[1]    MEDICATIONS:  Medications Ordered Prior to Encounter[2]    REVIEW OF SYSTEMS:  A 10-point system was reviewed.  Pertinent positives and negatives are noted in HPI.      PHYSICAL EXAMINATION:  VITAL SIGNS: /80 (BP Location: Right arm, Patient Position: Sitting, Cuff Size: adult)   Pulse 60     A&Ox3 with significant  expressive aphasia  PERRL, EOMi  Right sided facial droop  Full strength x 4, no drift  Incision healing well    ASSESSMENT:  53 yo female with symptomatic left-sided moyamoya disease status post left-sided EDAS on 9/13/24    I spoke with the patient and her  regarding the current clinical situation and plan of care.  We discussed her progress in recovering from her stroke and from surgery.  We discussed the signs and symptoms for which to seek emergent medical attention.  We discussed the modifiable risk factors for recurrent stroke.  We discussed the natural history of moyamoya disease.  We discussed that she does not have protection from recurrent stroke at this time given the indirect bypass, and we will reevaluate for synangiosis with a 6-month follow-up angiogram.  I recommended that they limit travel as she is still at risk for stroke.  After this discussion and answering their questions, they expressed understanding and appreciation.    Plan:  - Head CT ordered to evaluate for recent worsening of headache  - Continue aspirin daily at this time  - MRI/MRA brain with and without contrast with perfusion ordered to establish a baseline prior to follow-up angiogram  - Will plan for diagnostic cerebral angiogram 6 months after surgery  - Follow-up with Dr. Issa as planned for secondary stroke prevention    Cornelio Guzman MD  Neurological Surgery  [facility] Time: 40 min including face to face time, chart review, imaging interpretation, and coordination of care         [1]   Allergies  Allergen Reactions    Ciprofloxacin-Hydrocortisone ITCHING and HIVES     Tingling of hands and feet    Ibuprofen SWELLING    Nsaids ANGIOEDEMA    [place] Oil [[place] Oil]      [place]    Modafinil OTHER (SEE COMMENTS)     Pt felt like throat was closing   [2]   Current Outpatient Medications on File Prior to Visit   Medication Sig Dispense Refill     estradiol 0.025 MG/24HR Transdermal Patch Biweekly       fludrocortisone 0.1 MG Oral Tab Take 2 tablets (0.2 mg total) by mouth daily. 60 tablet 1    midodrine 10 MG Oral Tab Take 1 tablet (10 mg total) by mouth in the morning and 1 tablet (10 mg total) at noon and 1 tablet (10 mg total) in the evening. 90 tablet 1    clopidogrel 75 MG Oral Tab Take 1 tablet (75 mg total) by mouth daily. 30 tablet 5    XYWAV 500 MG/ML Oral Solution Take 180 mL by mouth at bedtime.      cholecalciferol 50 MCG (2000 UT) Oral Cap Take 1 capsule (2,000 Units total) by mouth daily.      aspirin 81 MG Oral Chew Tab Chew 1 tablet (81 mg total) by mouth daily. 30 tablet 5    vortioxetine 10 MG Oral Tab Take 1 tablet (10 mg total) by mouth nightly.      atorvastatin 40 MG Oral Tab Take 1 tablet (40 mg total) by mouth nightly. 30 tablet 2     No current facility-administered medications on file prior to visit.

## 2024-11-07 NOTE — PATIENT INSTRUCTIONS
Refill policies:    Allow 2-3 business days for refills; controlled substances may take longer.  Contact your pharmacy at least 5 days prior to running out of medication and have them send an electronic request or submit request through the “request refill” option in your [employer] account.  Refills are not addressed on weekends; covering physicians do not authorize routine medications on weekends.  No narcotics or controlled substances are refilled after noon on Fridays or by on call physicians.  By law, narcotics must be electronically prescribed.  A 30 day supply with no refills is the maximum allowed.  If your prescription is due for a refill, you may be due for a follow up appointment.  To best provide you care, patients receiving routine medications need to be seen at least once a year.  Patients receiving narcotic/controlled substance medications need to be seen at least once every 3 months.  In the event that your preferred pharmacy does not have the requested medication in stock (e.g. Backordered), it is your responsibility to find another pharmacy that has the requested medication available.  We will gladly send a new prescription to that pharmacy at your request.    Scheduling Tests:    If your physician has ordered radiology tests such as MRI or CT scans, please contact Central Scheduling at [phone] right away to schedule the test.  Once scheduled, the [facility] Centralized Referral Team will work with your insurance carrier to obtain pre-certification or prior authorization.  Depending on your insurance carrier, approval may take 3-10 days.  It is highly recommended patients assure they have received an authorization before having a test performed.  If test is done without insurance authorization, patient may be responsible for the entire amount billed.      Precertification and Prior Authorizations:  If your physician has recommended that you have a procedure or additional testing performed the [facility]  Centralized Referral Team will contact your insurance carrier to obtain pre-certification or prior authorization.    You are strongly encouraged to contact your insurance carrier to verify that your procedure/test has been approved and is a COVERED benefit.  Although the [facility] Centralized Referral Team does its due diligence, the insurance carrier gives the disclaimer that \"Although the procedure is authorized, this does not guarantee payment.\"    Ultimately the patient is responsible for payment.   Thank you for your understanding in this matter.  Paperwork Completion:  If you require FMLA or disability paperwork for your recovery, please make sure to either drop it off or have it faxed to our office at [phone]. Be sure the form has your name and date of birth on it.  The form will be faxed to our Forms Department and they will complete it for you.  There is a 25$ fee for all forms that need to be filled out.  Please be aware there is a 10-14 day turnaround time.  You will need to sign a release of information (JERAMY) form if your paperwork does not come with one.  You may call the Forms Department with any questions at [phone].  Their fax number is [phone].

## 2024-11-11 NOTE — PROGRESS NOTES
Diagnosis:   Unspecified speech disturbances [R47.9], Cerebral infarction, unspecified [I63.9]       Referring Provider: Daniel Brady  Date of Evaluation:   9/30/2024    Precautions:   CVA Next MD visit:   none scheduled  Date of Surgery: n/a   Insurance Primary/Secondary: [place] / N/A       # Visits on POC: 24   Total Timed Treatment: 45 min  Date POC Expires: 2/4/2025  Total Treatment time: 45 min  Charges: [id number]   Treatment Number: 13    Subjective: Patient arrived on time to session. Patient participated actively in therapeutic tasks. This treatment was provided by  clinician, Patricia Mg, under the direction and supervision of a licensed speech-language pathologist, who provided consultation regarding skilled judgements, treatment, and assessment of patient care.      Pain: Patient did not report pain during session.     Objective:  Goals: (to be met in 12 additional visits)   STG 1: Patient to complete further assessment with additional goals added as appropriate.  Progress: Goal met. See previous notes.    STG 2B: Patient will expand utterances to 4-6 word phrases within 80% of opportunities given mod verbal and visual cueing.  Progress: Patient expanded utterances to 4-6 word phrases with 80% accuracy given mod verbal and visual cues.     STG 3: Patient will generate at least 4 agents and 4 patients during verb network strengthening exercises given mod verbal and visual cues.  Progress: Patient able to generate 3 agents and 2 patients given mod-max verbal cueing. Goal reflects previous session; goal not targeted d/t focus on other goals.     STG 4: Patient to use trained compensatory speech strategies (slow rate, over-articulation, syllable segmentation, pausing between words, breath support) with 80% accuracy given mod verbal and visual cues.  Progress: Patient utilized over-articulation and segmentation strategies given mod cueing with 80% accuracy given mod verbal and visual cues.      STG 5: Patient will produce meaningful phrases with accurate articulation and 1 self-correction or less per phrase given mod verbal and visual cueing.   Progress: Patient benefited from mod cueing to support accurate articulation. Patient noted to increase awareness and ability to self-correct. Goal met; to gauge carryover in next session 2-3 sessions.     HEP: functional words/phrases, constant therapy   Education: Prognostic factors    Assessment: Patient presents with significant expressive language deficits and moderately impaired auditory comprehension. Patient's expressive language is marked by single words, hesitations, halting speech, significant word finding difficulty, slow speech, and limited yet effective functional phrases. Patient also presents with motor speech deficits c/b inconsistent articulation errors, incoordination of respiration and phonation, and increased difficulty with multi-syllable words. Patient's deficits impact her ability to communicate effectively. Patient continues to require verbal cueing to support revisions, phonemic cueing, syllable segmentation and repetition of stimuli to increase accuracy of productions and expansion of utterances to sentence level. Patient benefits from opportunities to write and read functional phrases and sentences to aid in multimodal communication. Patient continues to benefit from skilled cueing and lexical-semantic re-training. Continued intervention is medically necessary.       Plan: Continue speech therapy targeting expressive language.     Skilled treatment provided by Patricia Mg, SLP  Clinician.  Treatment supervised and note co-signed by Maryjo Hedrick MS CCC-SLP.

## 2024-11-14 NOTE — PROGRESS NOTES
Diagnosis:   Unspecified speech disturbances [R47.9], Cerebral infarction, unspecified [I63.9]       Referring Provider: Daniel Brady  Date of Evaluation:   9/30/2024    Precautions:   CVA Next MD visit:   none scheduled  Date of Surgery: n/a   Insurance Primary/Secondary: [employer] / N/A       # Visits on POC: 24   Total Timed Treatment: 45 min  Date POC Expires: 2/4/2025  Total Treatment time: 45 min  Charges: [id number]   Treatment Number: 14    Subjective: Patient arrived on time to session. Patient participated actively in therapeutic tasks. This treatment was provided by  clinician, Patricia Mg, under the direction and supervision of a licensed speech-language pathologist, who provided consultation regarding skilled judgements, treatment, and assessment of patient care.      Pain: Patient did not report pain during session.     Objective:  Goals: (to be met in 12 additional visits)   STG 1: Patient to complete further assessment with additional goals added as appropriate.  Progress: Goal met. See previous notes.    STG 2B: Patient will expand utterances to 4-6 word phrases within 80% of opportunities given mod verbal and visual cueing.  Progress: Patient expanded utterances to 4-6 word phrases with 80% accuracy given mod verbal and visual cues.     STG 3: Patient will generate at least 4 agents and 4 patients during verb network strengthening exercises given mod verbal and visual cues.  Progress: Patient able to generate 3 agents and 2 patients given mod-max verbal cueing.     STG 4: Patient to use trained compensatory speech strategies (slow rate, over-articulation, syllable segmentation, pausing between words, breath support) with 80% accuracy given mod verbal and visual cues.  Progress: Patient utilized over-articulation and segmentation strategies given mod cueing with 80% accuracy given mod verbal and visual cues.     STG 5: Patient will produce meaningful phrases with accurate  articulation and 1 self-correction or less per phrase given mod verbal and visual cueing.   Progress: Patient benefited from mod cueing to support accurate articulation. Patient noted to increase awareness and ability to self-correct. Goal met; to gauge carryover in next session 2-3 sessions.     HEP: functional words/phrases, constant therapy   Education: Prognostic factors    Assessment: Patient presents with significant expressive language deficits and moderately impaired auditory comprehension. Patient's expressive language is marked by single words, hesitations, halting speech, significant word finding difficulty, slow speech, and limited yet effective functional phrases. Patient also presents with motor speech deficits c/b inconsistent articulation errors, incoordination of respiration and phonation, and increased difficulty with multi-syllable words. Patient's deficits impact her ability to communicate effectively. Patient continues to require verbal cueing to support revisions, phonemic cueing, syllable segmentation and repetition of stimuli to increase accuracy of productions and expansion of utterances to sentence level. Patient benefits from opportunities to write and read functional phrases and sentences to aid in multimodal communication. Patient continues to benefit from skilled cueing and lexical-semantic re-training. Continued intervention is medically necessary.       Plan: Continue speech therapy targeting expressive language.     Skilled treatment provided by Patricia Mg, SLP  Clinician.  Treatment supervised and note co-signed by Maryjo Hedrick MS CCC-SLP.

## 2024-11-18 NOTE — PROGRESS NOTES
Diagnosis:   Unspecified speech disturbances [R47.9], Cerebral infarction, unspecified [I63.9]       Referring Provider: Daniel Brady  Date of Evaluation:   9/30/2024    Precautions:   CVA Next MD visit:   none scheduled  Date of Surgery: n/a   Insurance Primary/Secondary: [employer] / N/A       # Visits on POC: 24   Total Timed Treatment: 45 min  Date POC Expires: 2/4/2025  Total Treatment time: 45 min  Charges: [id number]   Treatment Number: 15    Subjective: Patient arrived on time to session. Patient participated actively in therapeutic tasks.    Pain: Patient did not report pain during session.     Objective:  Goals: (to be met in 12 additional visits)   STG 1: Patient to complete further assessment with additional goals added as appropriate.  Progress: Goal met. See previous notes.    STG 2B: Patient will expand utterances to 4-6 word phrases within 80% of opportunities given mod verbal and visual cueing.  Progress: Patient expanded utterances to 4-6 word phrases with 80% accuracy given mod verbal and visual cues. Goal met; to gauge carryover in next session.    STG 3: Patient will generate at least 4 agents and 4 patients during verb network strengthening exercises given mod verbal and visual cues.  Progress: Patient able to generate 3 agents and 2 patients given mod verbal cueing.     STG 4: Patient to use trained compensatory speech strategies (slow rate, over-articulation, syllable segmentation, pausing between words, breath support) with 80% accuracy given mod verbal and visual cues.  Progress: Patient utilized over-articulation and segmentation strategies with 80% accuracy given mod verbal and visual cues. Goal met; to gauge carryover in next session.    STG 5: Patient will produce meaningful phrases with accurate articulation and 1 self-correction or less per phrase given mod verbal and visual cueing.   Progress: Patient benefited from mod cueing to support accurate articulation. Patient noted to increase  awareness and ability to self-correct. Goal met; see updated goal.  STG 5B: Patient will produce meaningful phrases with accurate articulation given min verbal and visual cueing.    HEP: functional words/phrases, constant therapy   Education: Progress    Assessment: Patient presents with significant expressive language deficits and moderately impaired auditory comprehension. Patient's expressive language is marked by single words, hesitations, halting speech, significant word finding difficulty, slow speech, and limited yet effective functional phrases. Patient also presents with motor speech deficits c/b inconsistent articulation errors, incoordination of respiration and phonation, and increased difficulty with multi-syllable words. Patient's deficits impact her ability to communicate effectively. Patient demonstrates improved decoding when reading short sentences. Patient continues to require skilled cueing to support precise articulation for functional words/phrases. Continued intervention is medically necessary.      Plan: Continue speech therapy targeting expressive language.

## 2024-11-19 NOTE — TELEPHONE ENCOUNTER
I spoke to the patient over the phone regarding her imaging findings which are unconcerning.  She reports that her headaches persist although her may be less frequent.  She has planned follow-up with neurology next month.  I explained to reach out should she have any concerning signs or symptoms otherwise we will plan on seeing her for her follow-up 6-month diagnostic cerebral angiogram.  She will have an MRI/MRA brain with perfusion between now and then.  
[id number]

## 2024-11-20 NOTE — TELEPHONE ENCOUNTER
Spouse requesting 90 day supply of medications:    Medication: Aspirin 81mg     Date of last refill: 9/26/24 (#30/5)      Medication: Clopidogrel 75mg     Date of last refill: 10/524 (#30/5)      Medication: Fludocortisone 0.1mg     Date of last refill: 10/16/24 (#60/1)      Medication: Midodrine 10mg     Date of last refill: 10/16/24 (#90/1)       Last office visit: 9/27/24  Due back to clinic per last office note:  3m  Date next office visit scheduled:    Future Appointments   Date Time Provider Department Center   11/21/2024  1:00 PM Maryjo Hedrick [facility]   11/25/2024  2:00 PM Maryjo Hedrick [facility]   11/27/2024  9:00 AM Maryjo Hedrick [facility]   12/2/2024  2:00 PM Maryjo Hedrick [facility]   12/4/2024  1:15 PM Maryjo Hedrick [facility]   12/9/2024  1:10 PM Davis Issa MD [facility]   12/10/2024  1:00 PM aMryjo Hedrick [facility]   12/12/2024  2:45 PM Maryjo Hedrick [facility]   12/16/2024  2:00 PM Maryjo Hedrick [facility]   12/18/2024  1:15 PM Maryjo Hedrick [facility]   12/24/2024  9:00 AM Maryjo Hedrick [facility]           Last OV note recommendation:      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 Cerebrovascular accident (CVA) due to occlusion of left middle cerebral artery (HCC)  I63.512         3. 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 Mcclain Mcclain disease  3. Orthostatic hypotension        Plan  Continue Aspirin 81 mg and Clopidrogrel 75 mg daily  Continue midodrine 10 mg 3 times daily ( 7 am, 12-1 pm and 6 pm)  Continue Florinef 0.2 mg daily at 9 am     Hold Xywav ( gamma hydroxy butyrate) given recent cranial  surgery   Try Melatonin for sleep  No Hormonal supplements. Ok to use estrogen topical cream    Educated on signs/symptoms of stroke/TIA and non-pharmacological measures for OH      ?Increasing salt and water intake  ?Arising slowly (from a supine or seated position) to standing  ?Sleeping with the head of the bed raised  ?Avoiding straining/Valsalva  ?Limiting exposure to hot, humid environments  ?Using compression garments (waist-high compression stockings and abdominal binders)        Plan discussed with [facility]

## 2024-11-21 NOTE — PROGRESS NOTES
Diagnosis:   Unspecified speech disturbances [R47.9], Cerebral infarction, unspecified [I63.9]       Referring Provider: Daniel Brady  Date of Evaluation:   9/30/2024    Precautions:   CVA Next MD visit:   none scheduled  Date of Surgery: n/a   Insurance Primary/Secondary: [employer]PO / N/A       # Visits on POC: 24   Total Timed Treatment: 45 min  Date POC Expires: 2/4/2025  Total Treatment time: 45 min  Charges: [id number]   Treatment Number: 16    Subjective: Patient arrived on time to session. Patient participated actively in therapeutic tasks.    Pain: Patient did not report pain during session.     Objective:  Goals: (to be met in 12 additional visits)   STG 1: Patient to complete further assessment with additional goals added as appropriate.  Progress: Goal met. See previous notes.    STG 2B: Patient will expand utterances to 4-6 word phrases within 80% of opportunities given mod verbal and visual cueing.  Progress: Patient expanded utterances to 4-6 word phrases with 80% accuracy given mod verbal and visual cues. Goal met; to gauge carryover in next session.    STG 3: Patient will generate at least 4 agents and 4 patients during verb network strengthening exercises given mod verbal and visual cues.  Progress: Patient able to generate 3 agents and 2 patients given mod verbal cueing.     STG 4: Patient to use trained compensatory speech strategies (slow rate, over-articulation, syllable segmentation, pausing between words, breath support) with 80% accuracy given mod verbal and visual cues.  Progress: Patient utilized over-articulation and segmentation strategies with 80% accuracy given mod verbal and visual cues. Goal met; see updated goal.  STG 4B: Patient to use trained compensatory speech strategies (slow rate, over-articulation, syllable segmentation, pausing between words, breath support) with 80% accuracy given min verbal and visual cues.    STG 5B: Patient will produce meaningful phrases with accurate  articulation given min verbal and visual cueing.  Progress: Patient benefited from mod cueing to support accurate articulation. Patient noted to increase awareness and ability to self-correct.     HEP: functional words/phrases, constant therapy   Education: Progress    Assessment: Patient presents with significant expressive language deficits and moderately impaired auditory comprehension. Patient's expressive language is marked by single words, hesitations, halting speech, significant word finding difficulty, slow speech, and limited yet effective functional phrases. Patient also presents with motor speech deficits c/b inconsistent articulation errors, incoordination of respiration and phonation, and increased difficulty with multi-syllable words. Patient's deficits impact her ability to communicate effectively. Patient has increased her spontaneous utterance length and decoding for reading. Patient continues to benefit from cueing to support articulation precision and lexical retrieval. Continued intervention is medically necessary.      Plan: Continue speech therapy targeting expressive language and motor speech.

## 2024-11-25 NOTE — PROGRESS NOTES
Diagnosis:   Unspecified speech disturbances [R47.9], Cerebral infarction, unspecified [I63.9]       Referring Provider: Daniel Brady  Date of Evaluation:   9/30/2024    Precautions:   CVA Next MD visit:   none scheduled  Date of Surgery: n/a   Insurance Primary/Secondary: [employer] / N/A       # Visits on POC: 24   Total Timed Treatment: 45 min  Date POC Expires: 2/4/2025  Total Treatment time: 45 min  Charges: [id number]   Treatment Number: 17    Subjective: Patient arrived on time to session. Patient participated actively in therapeutic tasks.    Pain: Patient did not report pain during session.     Objective:  Goals: (to be met in 12 additional visits)   STG 1: Patient to complete further assessment with additional goals added as appropriate.  Progress: Goal met. See previous notes.    STG 2B: Patient will expand utterances to 4-6 word phrases within 80% of opportunities given mod verbal and visual cueing.  Progress: Patient expanded utterances to 4-6 word phrases with 80% accuracy given mod verbal and visual cues. Goal met; see updated goal.  STG 2C: Patient will expand utterances to 4-6 word phrases within 90% of opportunities given min verbal and visual cueing.    STG 3: Patient will generate at least 4 agents and 4 patients during verb network strengthening exercises given mod verbal and visual cues.  Progress: Patient able to generate 3 agents and 4 patients given mod verbal cueing.     STG 4B: Patient to use trained compensatory speech strategies (slow rate, over-articulation, syllable segmentation, pausing between words, breath support) with 80% accuracy given min verbal and visual cues.  Progress: Patient utilized over-articulation and segmentation strategies with 80% accuracy given mod verbal and visual cues.    STG 5B: Patient will produce meaningful phrases with accurate articulation given min verbal and visual cueing.  Progress: Patient benefited from mod cueing to support accurate articulation. Patient  noted to increase awareness and ability to self-correct.     HEP: functional words/phrases, constant therapy   Education: Progress    Assessment: Patient presents with significant expressive language deficits and moderately impaired auditory comprehension. Patient's expressive language is marked by single words, hesitations, halting speech, significant word finding difficulty, slow speech, and limited yet effective functional phrases. Patient also presents with motor speech deficits c/b inconsistent articulation errors, incoordination of respiration and phonation, and increased difficulty with multi-syllable words. Patient's deficits impact her ability to communicate effectively. Patient has demonstrated increased lexical retrieval and utterance length. Patient continues to require cueing to support precise articulation during conversation. Continued intervention is medically necessary.      Plan: Continue speech therapy targeting expressive language and motor speech.   None

## 2024-11-27 NOTE — PROGRESS NOTES
Diagnosis:   Unspecified speech disturbances [R47.9], Cerebral infarction, unspecified [I63.9]       Referring Provider: Daniel Brady  Date of Evaluation:   9/30/2024    Precautions:   CVA Next MD visit:   none scheduled  Date of Surgery: n/a   Insurance Primary/Secondary: [place] / N/A       # Visits on POC: 24   Total Timed Treatment: 45 min  Date POC Expires: 2/4/2025  Total Treatment time: 45 min  Charges: [id number]   Treatment Number: 18    Subjective: Patient arrived on time to session. Patient participated actively in therapeutic tasks.    Pain: Patient did not report pain during session.     Objective:  Goals: (to be met in 12 additional visits)   STG 1: Patient to complete further assessment with additional goals added as appropriate.  Progress: Goal met. See previous notes.    STG 2C: Patient will expand utterances to 4-6 word phrases within 90% of opportunities given min verbal and visual cueing.  Progress: Patient expanded utterances to 4-6 word phrases with 80% accuracy given mod verbal and visual cues.     STG 3: Patient will generate at least 4 agents and 4 patients during verb network strengthening exercises given mod verbal and visual cues.  Progress: Patient able to generate 3 agents and 4 patients given mod verbal cueing.     STG 4B: Patient to use trained compensatory speech strategies (slow rate, over-articulation, syllable segmentation, pausing between words, breath support) with 80% accuracy given min verbal and visual cues.  Progress: Patient utilized over-articulation and segmentation strategies with 80% accuracy given mod verbal and visual cues.    STG 5B: Patient will produce meaningful phrases with accurate articulation given min verbal and visual cueing.  Progress: Patient benefited from mod cueing to support accurate articulation. Patient noted to increase awareness and ability to self-correct.     HEP: functional words/phrases, constant therapy   Education: Progress    Assessment:  Patient presents with significant expressive language deficits and moderately impaired auditory comprehension. Patient's expressive language is marked by single words, hesitations, halting speech, significant word finding difficulty, slow speech, and limited yet effective functional phrases. Patient also presents with motor speech deficits c/b inconsistent articulation errors, incoordination of respiration and phonation, and increased difficulty with multi-syllable words. Patient demonstrates increased utterance length and ability to express self effectively. Patient continues to benefit from skilled cueing to support accurate articulation/motor planning and ability to communicate within tasks of increased complexity. Continued intervention is medically necessary.      Plan: Continue speech therapy targeting expressive language and motor speech.

## 2024-12-02 NOTE — PROGRESS NOTES
Diagnosis:   Unspecified speech disturbances [R47.9], Cerebral infarction, unspecified [I63.9]       Referring Provider: Daniel Brady  Date of Evaluation:   9/30/2024    Precautions:   CVA Next MD visit:   none scheduled  Date of Surgery: n/a   Insurance Primary/Secondary: [employer] / N/A       # Visits on POC: 24   Total Timed Treatment: 45 min  Date POC Expires: 2/4/2025  Total Treatment time: 45 min  Charges: [id number]   Treatment Number: 19    Subjective: Patient arrived on time to session. Patient participated actively in therapeutic tasks.    Pain: Patient did not report pain during session.     Objective:  Goals: (to be met in 12 additional visits)   STG 1: Patient to complete further assessment with additional goals added as appropriate.  Progress: Goal met. See previous notes.    STG 2C: Patient will expand utterances to 4-6 word phrases within 90% of opportunities given min verbal and visual cueing.  Progress: Patient expanded utterances to 4-6 word phrases with 85% accuracy given mod verbal and visual cues.     STG 3: Patient will generate at least 4 agents and 4 patients during verb network strengthening exercises given mod verbal and visual cues.  Progress: Patient able to generate 4 agents and 4 patients given mod verbal cueing. Goal met; to gauge carryover in next session.    STG 4B: Patient to use trained compensatory speech strategies (slow rate, over-articulation, syllable segmentation, pausing between words, breath support) with 80% accuracy given min verbal and visual cues.  Progress: Patient utilized over-articulation and segmentation strategies with 80% accuracy given min verbal and visual cues. Patient improved ability to self-monitor/self-correct. Goal met; to gauge carryover in next session.    STG 5B: Patient will produce meaningful phrases with accurate articulation given min verbal and visual cueing.  Progress: Patient benefited from min cueing to support accurate articulation. Goal met; to  gauge carryover in next session.    HEP: functional words/phrases, constant therapy   Education: Progress    Assessment: Patient presents with significant expressive language deficits and moderately impaired auditory comprehension. Patient's expressive language is marked by single words, hesitations, halting speech, significant word finding difficulty, slow speech, and limited yet effective functional phrases. Patient also presents with motor speech deficits c/b inconsistent articulation errors, incoordination of respiration and phonation, and increased difficulty with multi-syllable words. Patient has increased ability to express self. Patient continues to require cueing/modeling to increase use of complete sentences. Continued intervention is medically necessary.      Plan: Continue speech therapy targeting expressive language and motor speech.

## 2024-12-04 NOTE — PROGRESS NOTES
Diagnosis:   Unspecified speech disturbances [R47.9], Cerebral infarction, unspecified [I63.9]       Referring Provider: Daniel Brady  Date of Evaluation:   9/30/2024    Precautions:   CVA Next MD visit:   none scheduled  Date of Surgery: n/a   Insurance Primary/Secondary: [place] / N/A       # Visits on POC: 24   Total Timed Treatment: 45 min  Date POC Expires: 2/4/2025  Total Treatment time: 45 min  Charges: [id number]   Treatment Number: 20    Subjective: Patient arrived on time to session. Patient participated actively in therapeutic tasks.    Pain: Patient did not report pain during session.     Objective:  Goals: (to be met in 12 additional visits)   STG 1: Patient to complete further assessment with additional goals added as appropriate.  Progress: Goal met. See previous notes.    STG 2C: Patient will expand utterances to 4-6 word phrases within 90% of opportunities given min verbal and visual cueing.  Progress: Patient expanded utterances to 4-6 word phrases with 85% accuracy given min verbal and visual cues.     STG 3: Patient will generate at least 4 agents and 4 patients during verb network strengthening exercises given mod verbal and visual cues.  Progress: Patient able to generate 4 agents and 4 patients given mod verbal cueing. Goal met; to gauge carryover in next session.    STG 4B: Patient to use trained compensatory speech strategies (slow rate, over-articulation, syllable segmentation, pausing between words, breath support) with 80% accuracy given min verbal and visual cues.  Progress: Patient utilized over-articulation and segmentation strategies with 80% accuracy given min verbal and visual cues. Patient improved ability to self-monitor/self-correct. Goal met; see updated goal.  STG 4C: Patient to use trained compensatory speech strategies (slow rate, over-articulation, syllable segmentation, pausing between words, breath support) with 85% accuracy given min verbal and visual cues.    STG 5B:  Patient will produce meaningful phrases with accurate articulation given min verbal and visual cueing.  Progress: Patient benefited from min cueing to support accurate articulation. Goal met.    HEP: functional words/phrases, constant therapy   Education: Progress    Assessment: Patient presents with significant expressive language deficits and moderately impaired auditory comprehension. Patient's expressive language is marked by single words, hesitations, halting speech, significant word finding difficulty, slow speech, and limited yet effective functional phrases. Patient also presents with motor speech deficits c/b inconsistent articulation errors, incoordination of respiration and phonation, and increased difficulty with multi-syllable words. Patient continues to benefit from verbal and visual cues and opportunities for practice during structured therapeutic tasks. Continued intervention is medically necessary to support further progress toward goals and carryover to non-structured tasks.       Plan: Continue speech therapy targeting expressive language and motor speech.

## 2024-12-09 NOTE — PATIENT INSTRUCTIONS
Refill policies:    Allow 2-3 business days for refills; controlled substances may take longer.  Contact your pharmacy at least 5 days prior to running out of medication and have them send an electronic request or submit request through the “request refill” option in your [facility] account.  Refills are not addressed on weekends; covering physicians do not authorize routine medications on weekends.  No narcotics or controlled substances are refilled after noon on Fridays or by on call physicians.  By law, narcotics must be electronically prescribed.  A 30 day supply with no refills is the maximum allowed.  If your prescription is due for a refill, you may be due for a follow up appointment.  To best provide you care, patients receiving routine medications need to be seen at least once a year.  Patients receiving narcotic/controlled substance medications need to be seen at least once every 3 months.  In the event that your preferred pharmacy does not have the requested medication in stock (e.g. Backordered), it is your responsibility to find another pharmacy that has the requested medication available.  We will gladly send a new prescription to that pharmacy at your request.    Scheduling Tests:    If your physician has ordered radiology tests such as MRI or CT scans, please contact Central Scheduling at [phone] right away to schedule the test.  Once scheduled, the [facility] Centralized Referral Team will work with your insurance carrier to obtain pre-certification or prior authorization.  Depending on your insurance carrier, approval may take 3-10 days.  It is highly recommended patients assure they have received an authorization before having a test performed.  If test is done without insurance authorization, patient may be responsible for the entire amount billed.      Precertification and Prior Authorizations:  If your physician has recommended that you have a procedure or additional testing performed the [facility]  Centralized Referral Team will contact your insurance carrier to obtain pre-certification or prior authorization.    You are strongly encouraged to contact your insurance carrier to verify that your procedure/test has been approved and is a COVERED benefit.  Although the [facility] Centralized Referral Team does its due diligence, the insurance carrier gives the disclaimer that \"Although the procedure is authorized, this does not guarantee payment.\"    Ultimately the patient is responsible for payment.   Thank you for your understanding in this matter.  Paperwork Completion:  If you require FMLA or disability paperwork for your recovery, please make sure to either drop it off or have it faxed to our office at [phone]. Be sure the form has your name and date of birth on it.  The form will be faxed to our Forms Department and they will complete it for you.  There is a 25$ fee for all forms that need to be filled out.  Please be aware there is a 10-14 day turnaround time.  You will need to sign a release of information (JERAMY) form if your paperwork does not come with one.  You may call the Forms Department with any questions at [phone].  Their fax number is [phone].

## 2024-12-09 NOTE — PROGRESS NOTES
HPI:    Patient ID: Isabella Kevin is a 54 year old female.  PCP: Dr Steward      Neurologic Problem  The patient's pertinent negatives include no weakness. Pertinent negatives include no dizziness or headaches.     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 States she is doing the same, slight improvement in aphasia, still working with speech therapy. Denies any headaches.  BP at home 130-140, checks occasionally and not regularly. No episodes of hypotension. On Midodrine and Florinef as needed.      Initial history  Zeenat Kevin is a 54 year old female who presented for hospital follow up for Shelby Shelby s/p Left-sided frontotemporal qumsjiuoh-wvrx-maztn-synangiosis (EDAS) indirect bypass on 9/13/24 by Dr Guzman.    She initially presented to the hospital 8/27/24 with acute speech and language difficulty. MRI brain demonstrated a left hemisphere focus of infarct.  CTA head and neck demonstrated left ICA terminus steno-occlusive disease consistent with moyamoya.  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 Possible early right sided moyamoya disease with filling of the STEPHANY territory via pial collaterals     Post operatively had episodes of worsening aphasia and facial droop, BP dependent and  started on Midodrine. She continue to have orthostatic hypotension Florinef was added  Repeat mRI brain showed new infarction in the mid left MCA territory. She was discharged home on DAPT    Stable since discharge, BP fluctuates at home. Reports had history of labile hypertension and was on antihypertensive but since the stroke not taking any antihypertensives. She is taking Midodrine and Florinef as instructed. Water intake is inadequate.   She c/o fatigue, interrupted sleep since she is  not taking Xywav ( has hx of idiopathic hypersomnia) intermittent right arm involuntary twitch but no limb shaking or weakness.      HISTORY:  Past Medical History:    ADHD (attention deficit hyperactivity disorder)    Allergic rhinitis    Idiopathic hypersomnia    Osteoporosis    Seasonal affective disorder (HCC)    Vitamin D deficiency      Past Surgical History:   Procedure Laterality Date    Ablation  01/01/2011    uterus    Brain surgery  09/13/2024    neuro revascularization    Other surgical history  09/13/2024    CRANIOTOMY FOR A LEFT ENCEPHALODUROARTERIOSYNANGIOSIS (EDAS) INDIRECT BYPASS      Family History   Problem Relation Age of Onset    Hypertension Mother     Hypertension Brother     Other (stomach cancer) Paternal Aunt       Social History     Socioeconomic History    Marital status:    Tobacco Use    Smoking status: Never    Smokeless tobacco: Never   Vaping Use    Vaping status: Never Used   Substance and Sexual Activity    Alcohol use: Not Currently    Drug use: Never    Sexual activity: Yes     Partners: Male     Birth control/protection: Surgical     Comment: ablation    Other Topics Concern    Caffeine Concern Yes     Comment: tea    Stress Concern No    Weight Concern No    Special Diet No    Exercise No    Seat Belt Yes     Social Drivers of Health     Food Insecurity: No Food Insecurity (9/12/2024)    Food Insecurity     Food Insecurity: Never true   Transportation Needs: No Transportation Needs (9/12/2024)    Transportation Needs     Lack of Transportation: No   Housing Stability: Low Risk  (9/12/2024)    Housing Stability     Housing Instability: No        Review of Systems   Constitutional: Negative.    HENT: Negative.     Eyes: Negative.    Respiratory: Negative.     Cardiovascular: Negative.    Gastrointestinal: Negative.    Endocrine: Negative.    Genitourinary: Negative.    Musculoskeletal: Negative.    Skin: Negative.    Allergic/Immunologic: Negative.    Neurological:   Positive for facial asymmetry and speech difficulty. Negative for dizziness, weakness and headaches.   Hematological: Negative.    Psychiatric/Behavioral: Negative.     All other systems reviewed and are negative.           Current Outpatient Medications   Medication Sig Dispense Refill    aspirin 81 MG Oral Chew Tab Chew 1 tablet (81 mg total) by mouth daily. 90 tablet 0    clopidogrel 75 MG Oral Tab Take 1 tablet (75 mg total) by mouth daily. 90 tablet 0    fludrocortisone 0.1 MG Oral Tab Take 2 tablets (0.2 mg total) by mouth daily. 180 tablet 0    midodrine 10 MG Oral Tab Take 1 tablet (10 mg total) by mouth in the morning and 1 tablet (10 mg total) at noon and 1 tablet (10 mg total) in the evening. 270 tablet 0    XYWAV 500 MG/ML Oral Solution Take 180 mL by mouth at bedtime.      atorvastatin 40 MG Oral Tab Take 1 tablet (40 mg total) by mouth nightly. 30 tablet 2    estradiol 0.025 MG/24HR Transdermal Patch Biweekly  (Patient not taking: Reported on 12/9/2024)      vortioxetine 10 MG Oral Tab Take 1 tablet (10 mg total) by mouth nightly. (Patient not taking: Reported on 12/9/2024)       Allergies:  Allergies   Allergen Reactions    Ciprofloxacin-Hydrocortisone ITCHING and HIVES     Tingling of hands and feet    Ibuprofen SWELLING    Nsaids ANGIOEDEMA    Birch Tar Oil [Betula Alba Oil]      Prospect    Modafinil OTHER (SEE COMMENTS)     Pt felt like throat was closing     PHYSICAL EXAM:   Physical Exam    Blood pressure 110/60, pulse 75, weight 133 lb (60.3 kg), SpO2 98%, not currently breastfeeding.  General Appearance: Well nourished, well developed, no apparent distress.     HEENT: Normocephalic and atraumatic. Normal sclera. Moist mucus membrane  Neck: Normal range of motion. Neck supple.  Cardiovascular: Normal rate, regular rhythm and normal heart sounds.    Pulmonary/Chest: Effort normal and breath sounds normal.   Abdominal: Soft. Bowel sounds are normal.   Skin: dry, clean and intact  Ext:  peripheral pulses present  Psych: normal mood and affect    Neurological:  Patient is awake, alert and oriented to person, place and time   Mild expressive aphasia and dysarthia. Intact comprehension and naming    Cranial Nerves:   II: Visual acuity: normal   Visual fields: normal  III: Pupils: equal, round, reactive to light  III,IV,VI: Extra Ocular Movements: intact  V: Facial sensation: intact  VII: Facial strength: mild right facial weakness  VIII: Hearing: intact  IX: Palate: intact  XI: Shoulder shrug: intact  XII: Tongue movement: normal    Motor: No drift Strength is  5 out of 5 in all extremities bilaterally.  Sensory: Sensory examination is normal to light touch and pinprick   Coordination: Intact  Gait: Normal casual gait                                  TESTS/IMAGING:           MRI brain: 9/22/2024                   Impression   CONCLUSION:  New acute infarction in the mid left MCA territory is noted.  This occupies approximately 1/3 of the left MCA territory.           MRI brain 8/27/2024                  Impression   CONCLUSION:  Abnormal brain MRI.  There is a 15 mm focus of restricted diffusion within the posterior left frontal lobe.  There are smaller scattered punctate foci of restriction abnormality within the left central semiovale.  These imaging findings are  suspicious for a 15 mm acute infarct within the posterior left frontal lobe with scattered punctate subacute infarcts within the parasagittal left frontal lobe.  These findings are within the watershed territory of the left middle cerebral artery.  A CTA   of the head and neck is recommended for further assessment.  A demyelinating process cannot be entirely excluded and continued clinical and imaging follow-up is recommended for further assessment.                CTA head and neck    Impression   CONCLUSION:       1. There are stable scattered areas of hypoattenuation in the left frontal lobe there are better characterized on the recent  MRI of the brain and suspicious for acute to subacute infarcts.  The largest of these measures up to 16 mm.  Clinical correlation   and continued follow-up is suggested.  No evidence of hemorrhagic transformation.     2. 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  There is asymmetric diminutive caliber of the left internal carotid artery branches.  The left middle cerebral artery branches are asymmetrically diminutive when compared to the   right which is also likely on developmental basis.  There is marked hyperplasia of bilateral anterior cerebral artery A1 segments with markedly diminutive and dysplastic anterior cerebral artery branches the remainder of the territory period there are  wished of small vessels along the bilateral carotid termini.  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  The overall findings likely fall within the spectrum of moyamoya disease, with other etiologies not entirely excluded.  Clinical correlation  recommended.     3. No evidence of occlusion, dissection, or flow-limiting stenosis in the cervical vertebral or carotid arteries. No evidence of hemodynamically significant carotid stenosis by NASCET criteria.                 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 Cerebrovascular accident (CVA) due to occlusion of left middle cerebral artery (HCC)  I63.512       3. 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 Orthostatic hypotension      Plan  Continue Aspirin 81 mg and Clopidrogrel 75 mg daily  Continue midodrine 10 mg 3 times daily ( 7 am, 12-1 pm and 6 pm)  Continue Florinef 0.2 mg daily at 9 am  Monitor BP at home ( keep ~ 110-130). Counseled again on-pharmacological measures for OH  Increasing salt and water  intake, using compression stockings      MRI brain/MRA head w and wo contrast + perfusion scheduled in March 2025  Follows with Dr Guzman    Patient asking about air travel and skiing. Advised no skiing or any adventrous sport.  Ok to air travel since she is about close to 12 weeks post surgery. If any further questions please call neurosurgery  Patient and her  indicates understanding         Davis Issa MD   [facility] [place]      This note was prepared using Dragon Medical voice recognition dictation software. As a result errors may occur. When identified these errors have been corrected. While every attempt is made to correct errors during dictation discrepancies may still exist         Meds This Visit:  Requested Prescriptions      No prescriptions requested or ordered in this encounter       Imaging & Referrals:  None     ID#[id number]

## 2024-12-10 NOTE — PROGRESS NOTES
Diagnosis:   Unspecified speech disturbances [R47.9], Cerebral infarction, unspecified [I63.9]       Referring Provider: Daniel Brady  Date of Evaluation:   9/30/2024    Precautions:   CVA Next MD visit:   none scheduled  Date of Surgery: n/a   Insurance Primary/Secondary: [employer] / N/A       # Visits on POC: 24   Total Timed Treatment: 45 min  Date POC Expires: 2/4/2025  Total Treatment time: 45 min  Charges: [id number]   Treatment Number: 21    Subjective: Patient arrived on time to session. Patient participated actively in therapeutic tasks.    Pain: Patient did not report pain during session.     Objective:  Goals: (to be met in 12 additional visits)   STG 1: Patient to complete further assessment with additional goals added as appropriate.  Progress: Goal met. See previous notes.    STG 2C: Patient will expand utterances to 4-6 word phrases within 90% of opportunities given min verbal and visual cueing.  Progress: Patient expanded utterances to 4-6 word phrases with 85% accuracy given min verbal and visual cues.     STG 3: Patient will generate at least 4 agents and 4 patients during verb network strengthening exercises given mod verbal and visual cues.  Progress: Patient able to generate 4 agents and 4 patients given mod verbal cueing. Goal met; to gauge carryover in next sessions.    STG 4C: Patient to use trained compensatory speech strategies (slow rate, over-articulation, syllable segmentation, pausing between words, breath support) with 85% accuracy given min verbal and visual cues.  Progress: Patient utilized over-articulation and segmentation strategies with 80% accuracy given min verbal and visual cues.     STG 5B: Patient will produce meaningful phrases with accurate articulation given min verbal and visual cueing.  Progress: Patient benefited from min cueing to support accurate articulation. Goal met.    HEP: functional words/phrases, constant therapy   Education: Progress    Assessment: Patient  presents with expressive language deficits and mildly impaired auditory comprehension. Patient's expressive language is c/b hesitations, halting speech, and lexical retrieval deficits. Patient also presents with motor speech deficits c/b inconsistent articulation errors, incoordination of respiration and phonation, and increased difficulty with multi-syllable words. Patient demonstrates improved utterance expansion and use of compensatory strategies to support lexical retrieval. Patient continues to benefit from wait time and semantic and phonemic cueing during communication breakdowns. Continued intervention is medically necessary.      Plan: Continue speech therapy targeting expressive language and motor speech.

## 2024-12-12 NOTE — PROGRESS NOTES
Diagnosis:   Unspecified speech disturbances [R47.9], Cerebral infarction, unspecified [I63.9]       Referring Provider: Daniel Brady  Date of Evaluation:   9/30/2024    Precautions:   CVA Next MD visit:   none scheduled  Date of Surgery: n/a   Insurance Primary/Secondary: [place] / N/A       # Visits on POC: 24   Total Timed Treatment: 45 min  Date POC Expires: 2/4/2025  Total Treatment time: 45 min  Charges: [id number]   Treatment Number: 22    Subjective: Patient arrived on time to session. Patient participated actively in therapeutic tasks.    Pain: Patient did not report pain during session.     Objective:  Goals: (to be met in 12 additional visits)   STG 1: Patient to complete further assessment with additional goals added as appropriate.  Progress: Goal met. See previous notes.    STG 2C: Patient will expand utterances to 4-6 word phrases within 90% of opportunities given min verbal and visual cueing.  Progress: Patient expanded utterances to 4-6 word phrases with 85% accuracy given min verbal and visual cues.     STG 3: Patient will generate at least 4 agents and 4 patients during verb network strengthening exercises given mod verbal and visual cues.  Progress: Patient able to generate 4 agents and 4 patients given mod verbal cueing. Goal met; see updated goal.  STG 3B: Patient will generate at least 5 agents and 5 patients during verb network strengthening exercises given min verbal and visual cues.    STG 4C: Patient to use trained compensatory speech strategies (slow rate, over-articulation, syllable segmentation, pausing between words, breath support) with 85% accuracy given min verbal and visual cues.  Progress: Patient utilized over-articulation and segmentation strategies with 80% accuracy given min verbal and visual cues.     STG 5B: Patient will produce meaningful phrases with accurate articulation given min verbal and visual cueing.  Progress: Patient benefited from min cueing to support accurate  articulation. Goal met.    HEP: functional words/phrases, constant therapy   Education: Progress    Assessment: Patient presents with expressive language deficits and mildly impaired auditory comprehension. Patient's expressive language is c/b hesitations, halting speech, and lexical retrieval deficits. Patient also presents with motor speech deficits c/b inconsistent articulation errors, incoordination of respiration and phonation, and increased difficulty with multi-syllable words. Patient demonstrates improvement in utterance length and ability to express self within simple conversation. Patient benefits from phonemic and semantic cueing. Continued intervention is medically necessary.      Plan: Continue speech therapy targeting expressive language and motor speech.

## 2024-12-16 NOTE — PROGRESS NOTES
Diagnosis:   Unspecified speech disturbances [R47.9], Cerebral infarction, unspecified [I63.9]       Referring Provider: Daniel Brady  Date of Evaluation:   9/30/2024    Precautions:   CVA Next MD visit:   none scheduled  Date of Surgery: n/a   Insurance Primary/Secondary: [employer] / N/A       # Visits on POC: 24   Total Timed Treatment: 45 min  Date POC Expires: 2/4/2025  Total Treatment time: 45 min  Charges: [id number]   Treatment Number: 23    Subjective: Patient arrived on time to session. Patient participated actively in therapeutic tasks.    Pain: Patient did not report pain during session.     Objective:  Goals: (to be met in 12 additional visits)   STG 1: Patient to complete further assessment with additional goals added as appropriate.  Progress: Goal met. See previous notes.    STG 2C: Patient will expand utterances to 4-6 word phrases within 90% of opportunities given min verbal and visual cueing.  Progress: Patient expanded utterances to 4-6 word phrases with 90% accuracy given mod verbal and visual cues.     STG 3B: Patient will generate at least 5 agents and 5 patients during verb network strengthening exercises given min verbal and visual cues.  Progress: Patient able to generate 4 agents and 4 patients given mod verbal cueing.    STG 4C: Patient to use trained compensatory speech strategies (slow rate, over-articulation, syllable segmentation, pausing between words, breath support) with 85% accuracy given min verbal and visual cues.  Progress: Patient utilized over-articulation and segmentation strategies with 85% accuracy given min verbal and visual cues.     STG 5B: Patient will produce meaningful phrases with accurate articulation given min verbal and visual cueing.  Progress: Patient benefited from min cueing to support accurate articulation. Goal met.    HEP: functional words/phrases, constant therapy   Education: Progress, plan for narrative/discourse treatment    Assessment: Patient presents  with expressive language deficits and mildly impaired auditory comprehension. Patient's expressive language is c/b hesitations, halting speech, and lexical retrieval deficits. Patient also presents with motor speech deficits c/b inconsistent articulation errors, incoordination of respiration and phonation, and increased difficulty with multi-syllable words. Patient continues to demonstrate receptiveness to skilled cueing to support lexical retrieval and expand utterance length. Continued intervention is medically necessary to support progress toward goals and improvement in expression of self.      Plan: Continue speech therapy targeting expressive language and motor speech.

## 2024-12-16 NOTE — TELEPHONE ENCOUNTER
Patient and her daughter, Martha, \"obtained verbal ok from patient to go ahead with the call\", contacted the office stating that they would like to get matching tattoos and since patient is on a blood thinner, wanted to know if this would be okay to do and when if allowed to do so.   Please contact and advise.

## 2024-12-18 NOTE — PROGRESS NOTES
Diagnosis:   Unspecified speech disturbances [R47.9], Cerebral infarction, unspecified [I63.9]       Referring Provider: Daniel Brady  Date of Evaluation:   9/30/2024    Precautions:   CVA Next MD visit:   none scheduled  Date of Surgery: n/a   Insurance Primary/Secondary: [place] / N/A       # Visits on POC: 25   Total Timed Treatment: 45 min  Date POC Expires: 2/4/2025  Total Treatment time: 45 min  Charges: [id number]   Treatment Number: 24    Subjective: Patient arrived on time to session. Patient participated actively in therapeutic tasks.    Pain: Patient did not report pain during session.     Objective:  Goals: (to be met in 12 additional visits)   STG 1: Patient to complete further assessment with additional goals added as appropriate.  Progress: Goal met. See previous notes.    STG 2C: Patient will expand utterances to 4-6 word phrases within 90% of opportunities given min verbal and visual cueing.  Progress: Patient expanded utterances to 4-6 word phrases with 90% accuracy given mod verbal and visual cues.    STG 3B: Patient will generate at least 5 agents and 5 patients during verb network strengthening exercises given min verbal and visual cues.  Progress: Patient able to generate 5 agents and 5 patients given mod verbal cueing.    STG 4C: Patient to use trained compensatory speech strategies (slow rate, over-articulation, syllable segmentation, pausing between words, breath support) with 85% accuracy given min verbal and visual cues.  Progress: Patient utilized over-articulation and segmentation strategies with 85% accuracy given min verbal and visual cues.     STG 5B: Patient will produce meaningful phrases with accurate articulation given min verbal and visual cueing.  Progress: Patient benefited from min cueing to support accurate articulation. Goal met.    HEP: functional words/phrases, constant therapy   Education: Progress, plan for narrative/discourse treatment    Assessment: Patient presents  with expressive language deficits and mildly impaired auditory comprehension. Patient's expressive language is c/b hesitations, halting speech, and lexical retrieval deficits. Patient also presents with motor speech deficits c/b inconsistent articulation errors, incoordination of respiration and phonation, and increased difficulty with multi-syllable words. Patient continues to demonstrate receptiveness to skilled cueing to support lexical retrieval and expand utterance length. Continued intervention is medically necessary to support progress toward goals and improvement in expression of self. Patient to transition care to another outpatient speech therapy practice in the new year. As such, she will be discharged from this plan of care after next session with recommendation for continued therapeutic intervention.      Plan: Continue speech therapy targeting expressive language and motor speech.

## 2024-12-24 NOTE — PROGRESS NOTES
Diagnosis:   Unspecified speech disturbances [R47.9], Cerebral infarction, unspecified [I63.9]       Referring Provider: Daniel Brady  Date of Evaluation:   9/30/2024    Precautions:   CVA Next MD visit:   none scheduled  Date of Surgery: n/a   Insurance Primary/Secondary: [place] / N/A       # Visits on POC: 25   Total Timed Treatment: 45 min  Date POC Expires: 2/4/2025  Total Treatment time: 45 min  Charges: [id number]   Treatment Number: 25     Discharge Summary  Pt has attended 25 visits in Speech Therapy.     Dear Dr. Steward,  This letter is to inform you of Zeenat Kevin's progress in speech-language therapy.    Since her initial evaluation, Zeenat has attended 25 sessions. Therapy sessions have targeted motor speech and expressive language. A home exercise program (HEP) addressing lexical retrieval, reading, writing, and motor planning skills has been provided and completed consistently. During this treatment period, Zeenat has demonstrated improved expansion of utterances, awareness of errors, and use of compensatory strategies to express self effectively. At this time, Zeenat will transition to another outpatient rehabilitation clinic. As such, it is recommended that Zeenat be discharged from this plan of care to facilitate transition. Continued therapeutic intervention is medically necessary.    Subjective: Patient arrived on time to session. Patient participated actively in therapeutic tasks.    Pain: Patient did not report pain during session.     Objective:  Goals: (to be met in 12 additional visits)   STG 1: Patient to complete further assessment with additional goals added as appropriate.  Progress: Goal met. See previous notes.    STG 2C: Patient will expand utterances to 4-6 word phrases within 90% of opportunities given min verbal and visual cueing.  Progress: Patient expanded utterances to 4-6 word phrases with 90% accuracy given mod verbal and visual cues. Goal discontinued. Further goals to  be delineated at next level of care.    STG 3B: Patient will generate at least 5 agents and 5 patients during verb network strengthening exercises given min verbal and visual cues.  Progress: Patient able to generate 5 agents and 5 patients given mod verbal cueing. Goal discontinued. Further goals to be delineated at next level of care.    STG 4C: Patient to use trained compensatory speech strategies (slow rate, over-articulation, syllable segmentation, pausing between words, breath support) with 85% accuracy given min verbal and visual cues.  Progress: Patient utilized over-articulation and segmentation strategies with 85% accuracy given min verbal and visual cues. Goal discontinued. Further goals to be delineated at next level of care.    STG 5B: Patient will produce meaningful phrases with accurate articulation given min verbal and visual cueing.  Progress: Patient benefited from min cueing to support accurate articulation. Goal met.      Discharge (12/24/2024):  Patient administered the Western Aphasia Battery- Revised (WAB-R). The WAB-R is an individually administered test designed to evaluate a patient's language function and determine presence/absence of aphasia. The Aphasia Quotient (AQ) of the WAB-R is a core measure of aphasia derived from testing in the following subtests: Spontaneous Speech, Auditory Verbal Comprehension, Repetition, and Word Finding/Naming. Patient has an AQ of 89.6/100 which is below the normal range (score of 98.4/100 is indicative of non-brain damaged control group). The following scores were obtained:      Aphasia Quotient 89.6/100   Spontaneous Speech 17/20   Auditory Verbal Comprehension 9.2/10   Repetition 9.8/10   Naming/Word Finding 8.8/10       Evaluation (9/30/2024):  Patient administered the Western Aphasia Battery- Revised (WAB-R). The WAB-R is an individually administered test designed to evaluate a patient's language function and determine presence/absence of aphasia. The  Aphasia Quotient (AQ) of the WAB-R is a core measure of aphasia derived from testing in the following subtests: Spontaneous Speech, Auditory Verbal Comprehension, Repetition, and Word Finding/Naming. Patient has an AQ of 58.5/100 which is below the normal range (score of 98.4/100 is indicative of non-brain damaged control group). The following scores were obtained:      Aphasia Quotient 58.5/100   Spontaneous Speech 10/20   Auditory Verbal Comprehension 6.95/10   Repetition 6.7/10   Naming/Word Finding 5.6/10        HEP: functional words/phrases, constant therapy   Education: Progress, plan for narrative/discourse treatment    Assessment: Patient presents with expressive language deficits and mildly impaired auditory comprehension. Patient's expressive language is c/b hesitations, slow and deliberate speech, and lexical retrieval deficits. Patient also presents with motor speech deficits c/b inconsistent articulation errors, incoordination of respiration and phonation, and increased difficulty with multi-syllable words. From start of care, patient demonstrated receptiveness to skilled cueing to support lexical retrieval and expansion of utterance length. Patient has also improved upon her ability to self-monitor and self-correct. Continued intervention is medically necessary to support progress toward goals and improvement in expression of self. Patient to transition care to another outpatient speech therapy practice in the new year. As such, she will be discharged from this plan of care with recommendation for continue therapeutic intervention.      Plan: Patient to be discharged from this plan of care for speech-language therapy as she is transitioning care to another outpatient rehabilitation clinic. Continued skilled intervention targeting expressive language and motor speech is recommended.    Patient/Family/Caregiver was advised of these findings, precautions, and treatment options and has agreed to actively  participate in planning and for this course of care.    Thank you for your referral. If you have any questions, please contact me at Dept: [phone].    Sincerely,  Electronically signed by therapist: MERT Neville

## 2025-01-30 NOTE — TELEPHONE ENCOUNTER
Patient reminder received    \"Please set a reminder to follow up in February to schedule a follow up angiogram in March 2025 or April if necessary due to construction. \"    Note from Dr. Guzman on 11/19/24 states    \"I spoke to the patient over the phone regarding her imaging findings which are unconcerning.  She reports that her headaches persist although her may be less frequent.  She has planned follow-up with neurology next month.  I explained to reach out should she have any concerning signs or symptoms otherwise we will plan on seeing her for her follow-up 6-month diagnostic cerebral angiogram.  She will have an MRI/MRA brain with perfusion between now and then.\"          Both MRI and MRA have been ordred- routed to provider for clarification.

## 2025-01-31 NOTE — TELEPHONE ENCOUNTER
The MRAs and perfusion just need to be done before the angiogram so ok to push to March or April since the lab will probably still be down.

## 2025-02-06 NOTE — TELEPHONE ENCOUNTER
Faith from [facility] called in to have a referral faxed over for patient for ot and pt for the patient and would like it fax over to [phone] and if any other questions patient can be reached.

## 2025-02-06 NOTE — TELEPHONE ENCOUNTER
Per patient and her spouse, patient is requesting to have PT & OT added for extra support. Patient feels her right leg, foot and right arm are weaker during exercise. Per ST patient could benefit from the extra help.     Per spouse a letter was written to excuse patient until February however spouse states she is not ready to return. Advised to reach out to the provider who excused until February. No letter or paperwork stating this office took patient off work until February. Last letter was excusing her until end of November.

## 2025-02-07 NOTE — TELEPHONE ENCOUNTER
OT and PT orders signed by provider and faxed to [facility] with fax confirmation received.

## 2025-02-17 NOTE — TELEPHONE ENCOUNTER
Medication: Midodrine 10mg     Date of last refill: 11/20/24 (#270/0)  [place]      Medication: Fludrocortisone 0.1mg     Date of last refill: 11/20/24 (#180/0)  [place]     Last office visit: 12/9/24  Due back to clinic per last office note:  4m  Date next office visit scheduled:    Future Appointments   Date Time Provider Department Center   3/6/2025 11:45 AM  MR RM4 (3T WIDE)  [facility]   3/6/2025  1:00 PM  MR RM4 (3T WIDE)  [facility]   4/11/2025  3:50 PM Davis Issa MD [facility]           Last OV note recommendation:    Continue Aspirin 81 mg and Clopidrogrel 75 mg daily  Continue midodrine 10 mg 3 times daily ( 7 am, 12-1 pm and 6 pm)  Continue Florinef 0.2 mg daily at 9 am  Monitor BP at home ( keep ~ 110-130). Counseled again on-pharmacological measures for OH  Increasing salt and water intake, using compression stockings        MRI brain/MRA head w and wo contrast + perfusion scheduled in March 2025  Follows with Dr Guzman     Patient asking about air travel and skiing. Advised no skiing or any adventrous sport.  Ok to air travel since she is about close to 12 weeks post surgery. If any further questions please call neurosurgery  Patient and her  indicates understanding

## 2025-02-20 NOTE — TELEPHONE ENCOUNTER
Fax received from [facility] regarding patient Outpatient Occupational Therapy Initial Evaluation, dos 2/19/25. Notes endorsed to nurses bin for provider review.

## 2025-02-21 NOTE — TELEPHONE ENCOUNTER
Medication: ASPIRIN 81 MG Oral Chew Tab      Date of last refill: 11/20/2024 (#90/0)  [place]     Last office visit: 12/09/2024  Due back to clinic per last office note:  Around 04/09/2025  Date next office visit scheduled:    Future Appointments   Date Time Provider Department Center   3/6/2025 11:45 AM [facility] MR RM4 (3T WIDE)  [facility]   3/6/2025  1:00 PM  MR RM4 (3T WIDE)  [facility]   4/11/2025  3:50 PM Davis Issa MD [facility]           Last OV note recommendation:    Plan  Continue Aspirin 81 mg and Clopidrogrel 75 mg daily  Continue midodrine 10 mg 3 times daily ( 7 am, 12-1 pm and 6 pm)  Continue Florinef 0.2 mg daily at 9 am  Monitor BP at home ( keep ~ 110-130). Counseled again on-pharmacological measures for OH  Increasing salt and water intake, using compression stockings        MRI brain/MRA head w and wo contrast + perfusion scheduled in March 2025  Follows with Dr Guzman     Patient asking about air travel and skiing. Advised no skiing or any adventrous sport.  Ok to air travel since she is about close to 12 weeks post surgery. If any further questions please call neurosurgery  Patient and her  indicates understanding

## 2025-03-11 NOTE — PROGRESS NOTES
The following individual(s) verbally consented to be recorded using ambient AI listening technology and understand that they can each withdraw their consent to this listening technology at any point by asking the clinician to turn off or pause the recording:    Patient name: Isabella Garza Zeenat Kevin  Additional names:  Manjinder Kevin

## 2025-03-11 NOTE — PROGRESS NOTES
[facility]  Neurological Surgery Clinic Note    Isabella Kevin  1/4/1970  [id number]  PCP: Mildred Steward MD    REASON FOR VISIT:  Symptomatic left-sided moyamoya disease status post left-sided EDAS on 9/13/24     HISTORY OF PRESENT ILLNESS:  Isabella Kevin is a 54 year old female who originally presented on 8/27/24 with acute slurred speech and language difficulty.  She denied any right sided numbness or weakness.  MRI brain demonstrated a left hemisphere focus of infarct.  CTA head and neck demonstrated left ICA terminus steno-occlusive disease consistent with moyamoya.  She has been started on aspirin and Plavix by neurology.  She has a history of labile blood pressures for which she is on medication. She presented for diagnostic angiogram on 9/3/24. Prior to procedure she was noted to have AMS, slurred speech and HR was 30s with SBP 60s. Symptoms improved with IVF and pressors and angiogram was completed. She then presented on 9/13/24 for a left sided frontotemporal opftkzgwp-crji-luekmkh-synangiosis (EDAS) indirect bypass on 9/13/24. She was admitted to the neurocritical care unit post-operatively for close neurological monitoring. She was noted post-op to have aphasia and right sided weakness. She was found to be pressure dependent and started on Evelio gtt to increase BP. She was trialed titrating off the Evelio gtt and found to be symptomatic at lower SBP towards 120s and was started on Midodrine and then Florinef to optimize SBP with improvement in her symptoms. She was resumed on ASA post-op. Later, Plavix was started. She had a post-op MRI performed on 9/29/24 that revealed a new mid L MCA infarct. She worked with therapies and was recommended for outpatient speech therapy. She was deemed stable for discharge home on 9/25.      On 9/26/24, patient's spouse called our office with concerns of speech changes and low SBP in the 110s. BP improved  with Florineff and Midodrine. Symptoms were resolved later in the day. Patient was added on to see Dr Issa prior to this appointment and her medications were adjusted and patient was educated on the importance of adequate hydration and other methods like compression stockings. Today, she presents for a wound check.     Interval history 11/7/2024  She reports continued language difficulty since surgery, although this has been slowly improving.  She has been working with speech-language pathology for speech therapy.  She reports persistent left-sided headaches that have been slightly worsening recently.  She continues on aspirin daily.  She was recently out hiking and reports some mild right sided leg weakness.  She has some stable right lower face weakness.    Interval history 3/11/2025  She reports continued improvement in her expressive aphasia and is working with speech therapy.  She continues to take aspirin and Plavix daily.  She denies any recurrent stroke symptoms.  MRI/MRA brain with perfusion 3/6/2025 demonstrates left hemisphere elevated mean transit time and time to peak as well as a moderate region of diminished cerebral blood flow concerning for ongoing oligemia without evidence of acute stroke, expected postoperative changes from the left-sided EDAS, and relatively stable right A1 segment narrowing.    PAST MEDICAL HISTORY:  Past Medical History:    ADHD (attention deficit hyperactivity disorder)    Allergic rhinitis    Idiopathic hypersomnia    Osteoporosis    Seasonal affective disorder    Vitamin D deficiency       PAST SURGICAL HISTORY:  Past Surgical History:   Procedure Laterality Date    Ablation  01/01/2011    uterus    Brain surgery  09/13/2024    neuro revascularization    Other surgical history  09/13/2024    CRANIOTOMY FOR A LEFT ENCEPHALODUROARTERIOSYNANGIOSIS (EDAS) INDIRECT BYPASS       FAMILY HISTORY:  family history includes Hypertension in her brother and mother; stomach cancer in  her paternal aunt.    SOCIAL HISTORY:   reports that she has never smoked. She has never used smokeless tobacco. She reports that she does not currently use alcohol. She reports that she does not use drugs.    ALLERGIES:  Allergies[1]    MEDICATIONS:  Medications Ordered Prior to Encounter[2]    REVIEW OF SYSTEMS:  A 10-point system was reviewed.  Pertinent positives and negatives are noted in HPI.      PHYSICAL EXAMINATION:  VITAL SIGNS: /80 (BP Location: Right arm, Patient Position: Sitting, Cuff Size: adult)   Pulse 75   Ht 62\"   Wt 133 lb (60.3 kg)   BMI 24.33 kg/m²     A&Ox3 with significant expressive aphasia  PERRL, EOMi  Right sided facial droop  Full strength x 4, no drift  Incision healing well    ASSESSMENT:  55 yo female with symptomatic left-sided moyamoya disease status post left-sided EDAS on 9/13/24    I spoke with the patient and her  regarding the current clinical situation and plan of care.  We discussed the signs and symptoms for which to seek emergent medical attention.  We discussed the modifiable risk factors for recurrent stroke.  We discussed the natural history of moyamoya disease.  I explained that given the MRI findings, I would like to give more time for synangiosis prior to repeat angiography.  After this discussion and answering their questions, they expressed understanding and appreciation.    Plan:  - Continue antiplatelet therapy per neurology  - MRI/MRA brain with and without contrast with perfusion in 6 months  - Will determine plan for diagnostic cerebral angiogram after repeat imaging  - Follow-up with Dr. Issa as planned for secondary stroke prevention as scheduled    Cornelio uGzman MD  Neurological Surgery  [facility] Time: 30 min including face to face time, chart review, imaging interpretation, and coordination of care         [1]   Allergies  Allergen Reactions    Ciprofloxacin-Hydrocortisone  ITCHING and HIVES     Tingling of hands and feet    Ibuprofen SWELLING    Nsaids ANGIOEDEMA    Birch Tar Oil [Bet[place] Oil]      [place]    Modafinil OTHER (SEE COMMENTS)     Pt felt like throat was closing   [2]   Current Outpatient Medications on File Prior to Visit   Medication Sig Dispense Refill    ASPIRIN 81 MG Oral Chew Tab CHEW AND SWALLOW 1 TABLET(81 MG) BY MOUTH DAILY 90 tablet 0    MIDODRINE 10 MG Oral Tab TAKE 1 TABLET(10 MG) BY MOUTH IN THE MORNING AND AT NOON AND IN THE EVENING 270 tablet 1    FLUDROCORTISONE 0.1 MG Oral Tab TAKE 2 TABLETS(0.2 MG) BY MOUTH DAILY 180 tablet 1    clopidogrel 75 MG Oral Tab Take 1 tablet (75 mg total) by mouth daily. 90 tablet 0    estradiol 0.025 MG/24HR Transdermal Patch Biweekly       XYWAV 500 MG/ML Oral Solution Take 180 mL by mouth at bedtime.      vortioxetine 10 MG Oral Tab Take 1 tablet (10 mg total) by mouth nightly.      atorvastatin 40 MG Oral Tab Take 1 tablet (40 mg total) by mouth nightly. 30 tablet 2     No current facility-administered medications on file prior to visit.

## 2025-03-11 NOTE — PATIENT INSTRUCTIONS
Refill policies:    Allow 2-3 business days for refills; controlled substances may take longer.  Contact your pharmacy at least 5 days prior to running out of medication and have them send an electronic request or submit request through the “request refill” option in your [facility] account.  Refills are not addressed on weekends; covering physicians do not authorize routine medications on weekends.  No narcotics or controlled substances are refilled after noon on Fridays or by on call physicians.  By law, narcotics must be electronically prescribed.  A 30 day supply with no refills is the maximum allowed.  If your prescription is due for a refill, you may be due for a follow up appointment.  To best provide you care, patients receiving routine medications need to be seen at least once a year.  Patients receiving narcotic/controlled substance medications need to be seen at least once every 3 months.  In the event that your preferred pharmacy does not have the requested medication in stock (e.g. Backordered), it is your responsibility to find another pharmacy that has the requested medication available.  We will gladly send a new prescription to that pharmacy at your request.    Scheduling Tests:    If your physician has ordered radiology tests such as MRI or CT scans, please contact Central Scheduling at [phone] right away to schedule the test.  Once scheduled, the [facility] Centralized Referral Team will work with your insurance carrier to obtain pre-certification or prior authorization.  Depending on your insurance carrier, approval may take 3-10 days.  It is highly recommended patients assure they have received an authorization before having a test performed.  If test is done without insurance authorization, patient may be responsible for the entire amount billed.      Precertification and Prior Authorizations:  If your physician has recommended that you have a procedure or additional testing performed the [facility]  Centralized Referral Team will contact your insurance carrier to obtain pre-certification or prior authorization.    You are strongly encouraged to contact your insurance carrier to verify that your procedure/test has been approved and is a COVERED benefit.  Although the [facility] Centralized Referral Team does its due diligence, the insurance carrier gives the disclaimer that \"Although the procedure is authorized, this does not guarantee payment.\"    Ultimately the patient is responsible for payment.   Thank you for your understanding in this matter.  Paperwork Completion:  If you require FMLA or disability paperwork for your recovery, please make sure to either drop it off or have it faxed to our office at [phone]. Be sure the form has your name and date of birth on it.  The form will be faxed to our Forms Department and they will complete it for you.  There is a 25$ fee for all forms that need to be filled out.  Please be aware there is a 10-14 day turnaround time.  You will need to sign a release of information (JERAMY) form if your paperwork does not come with one.  You may call the Forms Department with any questions at [phone].  Their fax number is [phone].

## 2025-03-15 NOTE — ED INITIAL ASSESSMENT (HPI)
Pt c/o slip and fall injuring her left wrist . Pt has swelling and bruising to her left wrist. Pt also has bruising to her left elbow.  Pt states she hit her head light as she fell backwards.  Pt denies LOC.

## 2025-03-15 NOTE — ED PROVIDER NOTES
Patient Seen in: [facility]      History     Chief Complaint   Patient presents with    Wrist Injury     Stated Complaint: Left wrist    Subjective:   HPI  55-year-old female with hypersomnia, moyamoya disease, osteoporosis, ADHD, allergic rhinitis, and CVA who is on aspirin and Plavix presents complaining of left wrist pain status post [place] [employer] [place].  She states she landed on her buttocks and lightly hit the back of her head.  She denies any loss of consciousness.  She denies any nausea or vomiting.  She denies any neck pain.    Objective:     No pertinent past medical history.            No pertinent past surgical history.              No pertinent social history.            Review of Systems   All other systems reviewed and are negative.      Positive for stated complaint: Left wrist  Other systems are as noted in HPI.  Constitutional and vital signs reviewed.      All other systems reviewed and negative except as noted above.    Physical Exam     ED Triage Vitals [03/15/25 0923]   BP (!) 167/77   Pulse 80   Resp 20   Temp 97.7 °F (36.5 °C)   Temp src Oral   SpO2 97 %   O2 Device None (Room air)       Current Vitals:   Vital Signs  BP: (!) 167/77  Pulse: 80  Resp: 20  Temp: 97.7 °F (36.5 °C)  Temp src: Oral    Oxygen Therapy  SpO2: 97 %  O2 Device: None (Room air)        Physical Exam  Vitals and nursing note reviewed.   Constitutional:       General: She is not in acute distress.     Appearance: She is well-developed. She is not ill-appearing or toxic-appearing.   HENT:      Head: Atraumatic.   Eyes:      Extraocular Movements: Extraocular movements intact.      Pupils: Pupils are equal, round, and reactive to light.   Cardiovascular:      Rate and Rhythm: Normal rate and regular rhythm.      Heart sounds: Normal heart sounds.   Pulmonary:      Effort: Pulmonary effort is normal.      Breath sounds: Normal breath sounds.   Musculoskeletal:      Cervical  back: Normal range of motion.      Comments: Left wrist swelling with tenderness.  Ecchymosis to the left elbow.  Pain with pronation and supination to the wrist.  Radial pulse 2+.   Skin:     General: Skin is warm and dry.   Neurological:      Mental Status: She is alert and oriented to person, place, and time.             ED Course   Labs Reviewed - No data to display         XR WRIST COMPLETE (MIN 3 VIEWS), LEFT (CPT=73110)    Result Date: 3/15/2025  PROCEDURE:  XR WRIST COMPLETE (MIN 3 VIEWS), LEFT (CPT=73110)  TECHNIQUE:  Three views were obtained.  COMPARISON:  None.  INDICATIONS:  Left wrist  PATIENT STATED HISTORY: (As transcribed by Technologist)  Pt. c/o left wrist pain, bruising, and swelling. Pt. fell backwards yesterday.    FINDINGS:  Mild left wrist soft tissue swelling.  Possible subtle minimally displaced triquetral fracture on the lateral view.  Consider follow-up radiographs in 7-10 days for further assessment.             CONCLUSION:  See above.   LOCATION:  [facility]   Dictated by (CST): Stromberg, LeRoy, MD on 3/15/2025 at 9:55 AM     Finalized by (CST): Stromberg, LeRoy, MD on 3/15/2025 at 9:57 AM       MRA BRAIN (W+WO) (PIT=28146)    Result Date: 3/6/2025  PROCEDURE:  MRA, HEAD (W+WO) (CPT=70546)  COMPARISON:  None.  INDICATIONS:  I63.89 Cerebrovascular accident (CVA) due to other mechanism ([facility]) I67.5 Moyamoya disease  TECHNIQUE:  MR angiography was performed in the usual manner with and without contrast. Multiplanar reconstructed 2D and 3D images of the cerebral arteries, including MIP imaging, were created and interpreted.  All measurements obtained in this exam were  performed using NASCET criteria.  PATIENT STATED HISTORY:(As transcribed by Technologist)  Surveillance imaging, no new complaints. Moyamoya disease.  Cerebrovascular accident (CVA) due to other mechanism.   CONTRAST USED:  20 mL of Dotarem  FINDINGS:   There are postoperative changes from a left-sided extracranial to  intracranial bypass.  There is arterial flow related enhancement within a vascular branch at the left-sided craniotomy.  There is flow related enhancement from this internalized vessel with a vessel at an anterior left temporal lobe sulcus.  However, there is overall poor flow related enhancement throughout the left middle cerebral artery.  The distal left cervical, petrous, cavernous, and supraclinoid segments of the left internal carotid artery are small in caliber likely secondary to poor inflow from the left-sided moyamoya disease.  The left carotid terminus appears occluded.  There is poor flow related enhancement at the M1 segment of the left middle cerebral artery.  No significant flow related enhancement is seen at the left A1 segment of the anterior cerebral artery.  There is flow related enhancement seen within left anterior cerebral artery branches, however, these do appear attenuated relative to the right STEPHANY branches.  The distal left posterior cerebral artery branches appear hypertrophic likely secondary to collateralization.  The distal right cervical, petrous, cavernous, and supraclinoid segments of the right internal carotid artery are patent.  A moderate-sized right posterior communicating artery is identified.  The right A1 segment is small in caliber.  The right middle cerebral artery is widely patent.  The V3 and V4 segments of the vertebral arteries are patent.  The basilar artery is patent.  The posterior cerebral arteries are patent.             CONCLUSION:   1. There are postoperative changes from a left-sided extracranial to intracranial bypass.  There is arterial flow related enhancement within a vascular branch at the left-sided craniotomy.  There is flow related enhancement from this internalized vessel with a vessel at an anterior left temporal lobe sulcus.  However, there is overall poor flow related enhancement throughout the left middle cerebral artery.  2. Sequelae of left-sided  moyamoya disease is again noted with probable occlusion at the left carotid terminus, diminutive caliber of the visualized left internal carotid artery, and hypertrophy of the distal left posterior cerebral artery branches likely secondary to collateralization.  3. The right A1 segment of the anterior cerebral artery is diminutive in caliber.  The left A1 segment is not seen.  There is overall moderate attenuation of the flow related enhancement within the anterior cerebral arteries.  This may represent developing moyamoya disease at the bilateral anterior cerebral artery circulation.    The radiology support staff is in the process of contacting the referring physician regarding this report.    LOCATION:  [id number]   Dictated by (CST): Stromberg, LeRoy, MD on 3/06/2025 at 3:23 PM     Finalized by (CST): Stromberg, LeRoy, MD on 3/06/2025 at 3:36 PM       MRI BRAIN (W+WO) + PERFUSION (CPT=70553)    Result Date: 3/6/2025  PROCEDURE:  MRI BRAIN (W+WO) + PERFUSION (CPT=70553)  COMPARISON:  [facility] , CT, CT BRAIN OR HEAD (12428), 11/16/2024, 9:22 AM.  [facility] , MR, MRA, HEAD (W+WO) (CPT=70546), 3/06/2025, 12:09 PM.  [facility] , MR, MRI BRAIN (CPT=70551), 9/22/2024, 2:39 PM.  INDICATIONS:  I63.89 Cerebrovascular accident (CVA) due to other mechanism ([facility]) I67.5 Moyamoya disease  TECHNIQUE:  MRI of the brain was performed with multi-planar T1, T2-weighted images with FLAIR sequences and diffusion weighted images without and with infusion.  PATIENT STATED HISTORY:(As transcribed by Technologist)  Surveillance imaging, no new complaints. Moyamoya disease.  Cerebrovascular accident (CVA) due to other mechanism.   CONTRAST USED:  20 mL of Dotarem  FINDINGS:  No hydrocephalus.  There is no midline shift or mass effect.  The basal cisterns are patent.   There is no acute intracranial hemorrhage or extra-axial fluid collection identified.  There is no restricted diffusion to suggest acute ischemia/infarction.  There is a moderate-sized  subacute to chronic infarct within the posterior/lateral left frontal lobe with regions of T2 shine through.  This was noted on the prior brain MRI from 9/22/2024.  There are regions of T1 shortening in this region of prior ischemia which likely represents laminar necrosis.  There are postoperative changes from a left parietal temporal craniotomy for the purposes of extracranial to intracranial bypass.  There is dural thickening/enhancement subjacent to the craniotomy consistent with postoperative changes.  There is cortical gradient susceptibility at the lateral left frontal lobe which may represent a combination of laminar necrosis and superficial siderosis.  There is a punctate chronic infarct at the posterior right cerebellum.  The visualized paranasal sinuses and mastoid air cells are unremarkable.   MR perfusion was performed.  There is diminished relative cerebral blood volume within the region of subacute to chronic ischemia within the left MCA territory.  There is a moderate size region of diminished relative cerebral blood flow throughout portions of the mid and posterior left frontal lobe.  The extent of diminished relative cerebral blood flow appears mildly greater than the extent of diminished relative cerebral blood volume.  There is markedly elevated mean transit time and time to peak within the vascular territories of the left anterior cerebral artery and left middle cerebral artery.            CONCLUSION:  Imaging findings are again consistent with left-sided moyamoya disease with markedly elevated mean transit time and time to peak within the vascular territories of the left anterior cerebral artery and left middle cerebral artery.  There is a moderate size region of diminished relative cerebral blood flow within portions of the mid and posterior left frontal lobe which may represent some regions of oligemia.  No acute infarct is seen on today's examination.  There is a moderate-sized subacute to  chronic infarct within the mid/posterior left frontal lobe.  Postoperative changes are again seen from a left-sided extracranial to intracranial bypass.   The radiology support staff is in the process of contacting the referring physician regarding this report.   LOCATION:  [id number]    Dictated by (CST): Stromberg, LeRoy, MD on 3/06/2025 at 3:02 PM     Finalized by (CST): Stromberg, LeRoy, MD on 3/06/2025 at 3:22 PM               MDM       Medical Decision Making  55-year-old female with hypersomnia, moyamoya disease, osteoporosis, ADHD, allergic rhinitis, and CVA who is on aspirin and Plavix presents complaining of left wrist pain status post slip and fall while [place] the [employer] yesterday.  She states she landed on her buttocks and lightly hit the back of her head.  She denies any loss of consciousness.  She denies any nausea or vomiting.  She denies any neck pain.    Pertinent Labs & Imaging studies reviewed. (See chart for details).  Patient coming in with wrist pain.   Differential diagnosis includes but not limited to wrist pain, wrist sprain, wrist fracture, elbow contusion, elbow fracture, head injury, intracranial bleed  Radiology Mild left wrist soft tissue swelling.  Possible subtle minimally displaced triquetral fracture on the lateral view.  CT brain declined by patient and .  Will treat for displaced fracture of triquetrum bone of left wrist, head injury, left elbow contusion.  Will discharge on Tylenol as needed with orthopedic follow-up with ice and elevation and ER precautions. Patient/Parent is comfortable with this plan.    Short in place with CMS intact post application and sling provided.  Patient instructed on Tylenol with ice and elevation and close orthopedic follow-up.  I offered CT brain imaging due to patient being on aspirin and Plavix and head injury which was declined by patient and her .  I discussed if she gets any headache, altered mental status, nausea/vomiting, or  any worsening symptoms she should seek immediate emergency evaluation.  Patient is alert and oriented and  verbalized understanding.  Vital signs are stable.  Patient is neurovascularly intact to baseline.  She states she landed on her buttocks and that her head lightly hit the ground.        Problems Addressed:  Contusion of left elbow, initial encounter: acute illness or injury  Displaced fracture of triquetrum (cuneiform) bone, left wrist, initial encounter for closed fracture: acute illness or injury  Injury of head, initial encounter: acute illness or injury    Amount and/or Complexity of Data Reviewed  Independent Historian: spouse        Disposition and Plan     Clinical Impression:  1. Displaced fracture of triquetrum (cuneiform) bone, left wrist, initial encounter for closed fracture    2. Injury of head, initial encounter    3. Contusion of left elbow, initial encounter         Disposition:  Discharge  3/15/2025 10:09 am    Follow-up:  Valente Valderrama PA  [address] [place] [id number]  [phone]    Call             Medications Prescribed:  Discharge Medication List as of 3/15/2025 10:09 AM              Supplementary Documentation:

## 2025-03-15 NOTE — DISCHARGE INSTRUCTIONS
Keep splint on with sling. Do not use sling when sleeping.   Ice and elevate wrist.  Follow up with ortho.  Tylenol for pain.  Go to ER with any increased headache, vomiting, or altered loc.

## 2025-03-17 NOTE — TELEPHONE ENCOUNTER
Patient seen in  s/p fall DOI 3/14/25.   Xrays done, sent home with splint and sling.     XR WRIST COMPLETE (MIN 3 VIEWS), LEFT   FINDINGS:  Mild left wrist soft tissue swelling.  Possible subtle minimally displaced triquetral fracture on the lateral view.  Consider follow-up radiographs in 7-10 days for further assessment.         Please advise when patient can be seen & if new xrays needed.   Thank you

## 2025-03-17 NOTE — TELEPHONE ENCOUNTER
Patient was seen on 3/15 at  for left wrist fx.    Imaging in Epic.    Please advise when patient can be seen.

## 2025-03-18 NOTE — H&P
[facility] - ORTHOPEDICS  [address] [place] [id number]  [phone]     NEW PATIENT VISIT - HISTORY AND PHYSICAL EXAMINATION     Name: Isabella King   MRN: [id number]  Date: 3/18/2025     CC: Left hand/wrist pain.     REFERRED BY: Mildred Steward MD    HPI:   Isabella Kevin is a very pleasant 55 year old right-hand dominant female who presents today for evaluation, consultation, and management of left wrist injury that occurred after a fall on March 14, 2025.  She complains of swelling.  She works as a physical therapist.  She has pain worse with flexion.    PMH:   Past Medical History:    ADHD (attention deficit hyperactivity disorder)    Allergic rhinitis    Idiopathic hypersomnia    Osteoporosis    Seasonal affective disorder    Vitamin D deficiency       PAST SURGICAL HX:  Past Surgical History:   Procedure Laterality Date    Ablation  01/01/2011    uterus    Brain surgery  09/13/2024    neuro revascularization    Other surgical history  09/13/2024    CRANIOTOMY FOR A LEFT ENCEPHALODUROARTERIOSYNANGIOSIS (EDAS) INDIRECT BYPASS       FAMILY HX:  Family History   Problem Relation Age of Onset    Hypertension Mother     Hypertension Brother     Other (stomach cancer) Paternal Aunt        ALLERGIES:  Ciprofloxacin-hydrocortisone, Ibuprofen, Nsaids, Birch tar oil [betula alba oil], and Modafinil    MEDICATIONS:   Current Outpatient Medications   Medication Sig Dispense Refill    ASPIRIN 81 MG Oral Chew Tab CHEW AND SWALLOW 1 TABLET(81 MG) BY MOUTH DAILY 90 tablet 0    MIDODRINE 10 MG Oral Tab TAKE 1 TABLET(10 MG) BY MOUTH IN THE MORNING AND AT NOON AND IN THE EVENING 270 tablet 1    FLUDROCORTISONE 0.1 MG Oral Tab TAKE 2 TABLETS(0.2 MG) BY MOUTH DAILY 180 tablet 1    clopidogrel 75 MG Oral Tab Take 1 tablet (75 mg total) by mouth daily. 90 tablet 0    estradiol 0.025 MG/24HR Transdermal Patch Biweekly       XYWAV 500 MG/ML Oral Solution Take 180 mL by mouth at  bedtime.      vortioxetine 10 MG Oral Tab Take 1 tablet (10 mg total) by mouth nightly.      atorvastatin 40 MG Oral Tab Take 1 tablet (40 mg total) by mouth nightly. 30 tablet 2       ROS: A complete 10 point review of systems performed and negative aside from the aforementioned per history of present illness.     SOCIAL HX:  Social History     Occupational History    Not on file   Tobacco Use    Smoking status: Never    Smokeless tobacco: Never   Vaping Use    Vaping status: Never Used   Substance and Sexual Activity    Alcohol use: Not Currently    Drug use: Never    Sexual activity: Yes     Partners: Male     Birth control/protection: Surgical     Comment: ablation          PE:   Vitals:    03/18/25 1043   Weight: 127 lb (57.6 kg)   Height: 5' 2\" (1.575 m)     Estimated body mass index is 23.23 kg/m² as calculated from the following:    Height as of this encounter: 5' 2\" (1.575 m).    Weight as of this encounter: 127 lb (57.6 kg).    Physical Exam  Constitutional:       Appearance: Normal appearance.   HENT:      Head: Normocephalic and atraumatic.   Eyes:      Extraocular Movements: Extraocular movements intact.   Neck:      Musculoskeletal: Normal range of motion and neck supple.   Cardiovascular:      Pulses: Normal pulses.   Pulmonary:      Effort: Pulmonary effort is normal. No respiratory distress.   Abdominal:      General: There is no distension.   Skin:     General: Skin is warm.      Capillary Refill: Capillary refill takes less than 2 seconds.      Findings: No bruising.   Neurological:      General: No focal deficit present.      Mental Status: Alert.   Psychiatric:         Mood and Affect: Mood normal.     Examination of the left  WRIST demonstrates:     Skin is intact, warm and dry.     Inspection:   Atrophy: none    Effusion: none    Edema: mild    Erythema: none    Hematoma: none      Palpation:   Tenderness at fracture site.     ROM:   Not tested d/t nature of injury and fracture.     Strength:  Not tested d/t nature of injury and fracture.     Special Tests:   Not tested d/t nature of injury and fracture.     No obvious peripheral edema noted.   Distal neurovascular exam demonstrates normal perfusion, intact sensation to light touch and full strength.     Examination of the contralateral extremity demonstrates:  No significant atrophy, swelling or effusion. Full range of motion. Neurovascularly intact distally.  The contralateral upper extremity is without limitation in range of motion or strength, no positive provocative maneuvers.     Radiographic Examination/Diagnostics:    I personally viewed, independently interpreted and radiology report was reviewed.    XR WRIST COMPLETE (MIN 3 VIEWS), LEFT (CPT=73110)    Result Date: 3/15/2025  PROCEDURE:  XR WRIST COMPLETE (MIN 3 VIEWS), LEFT (CPT=73110)  TECHNIQUE:  Three views were obtained.  COMPARISON:  None.  INDICATIONS:  Left wrist  PATIENT STATED HISTORY: (As transcribed by Technologist)  Pt. c/o left wrist pain, bruising, and swelling. Pt. fell backwards yesterday.    FINDINGS:  Mild left wrist soft tissue swelling.  Possible subtle minimally displaced triquetral fracture on the lateral view.  Consider follow-up radiographs in 7-10 days for further assessment.             CONCLUSION:  See above.   LOCATION:  [facility]   Dictated by (CST): Stromberg, LeRoy, MD on 3/15/2025 at 9:55 AM     Finalized by (CST): Stromberg, LeRoy, MD on 3/15/2025 at 9:57 AM       MRA BRAIN (W+WO) (EKV=43989)    Result Date: 3/6/2025  PROCEDURE:  MRA, HEAD (W+WO) (CPT=70546)  COMPARISON:  None.  INDICATIONS:  I63.89 Cerebrovascular accident (CVA) due to other mechanism (HCC) I67.5 Moyamoya disease  TECHNIQUE:  MR angiography was performed in the usual manner with and without contrast. Multiplanar reconstructed 2D and 3D images of the cerebral arteries, including MIP imaging, were created and interpreted.  All measurements obtained in this exam were  performed using NASCET criteria.   PATIENT STATED HISTORY:(As transcribed by Technologist)  Surveillance imaging, no new complaints. Moyamoya disease.  Cerebrovascular accident (CVA) due to other mechanism.   CONTRAST USED:  20 mL of Dotarem  FINDINGS:   There are postoperative changes from a left-sided extracranial to intracranial bypass.  There is arterial flow related enhancement within a vascular branch at the left-sided craniotomy.  There is flow related enhancement from this internalized vessel with a vessel at an anterior left temporal lobe sulcus.  However, there is overall poor flow related enhancement throughout the left middle cerebral artery.  The distal left cervical, petrous, cavernous, and supraclinoid segments of the left internal carotid artery are small in caliber likely secondary to poor inflow from the left-sided moyamoya disease.  The left carotid terminus appears occluded.  There is poor flow related enhancement at the M1 segment of the left middle cerebral artery.  No significant flow related enhancement is seen at the left A1 segment of the anterior cerebral artery.  There is flow related enhancement seen within left anterior cerebral artery branches, however, these do appear attenuated relative to the right STEPHANY branches.  The distal left posterior cerebral artery branches appear hypertrophic likely secondary to collateralization.  The distal right cervical, petrous, cavernous, and supraclinoid segments of the right internal carotid artery are patent.  A moderate-sized right posterior communicating artery is identified.  The right A1 segment is small in caliber.  The right middle cerebral artery is widely patent.  The V3 and V4 segments of the vertebral arteries are patent.  The basilar artery is patent.  The posterior cerebral arteries are patent.             CONCLUSION:   1. There are postoperative changes from a left-sided extracranial to intracranial bypass.  There is arterial flow related enhancement within a vascular  branch at the left-sided craniotomy.  There is flow related enhancement from this internalized vessel with a vessel at an anterior left temporal lobe sulcus.  However, there is overall poor flow related enhancement throughout the left middle cerebral artery.  2. Sequelae of left-sided moyamoya disease is again noted with probable occlusion at the left carotid terminus, diminutive caliber of the visualized left internal carotid artery, and hypertrophy of the distal left posterior cerebral artery branches likely secondary to collateralization.  3. The right A1 segment of the anterior cerebral artery is diminutive in caliber.  The left A1 segment is not seen.  There is overall moderate attenuation of the flow related enhancement within the anterior cerebral arteries.  This may represent developing moyamoya disease at the bilateral anterior cerebral artery circulation.    The radiology support staff is in the process of contacting the referring physician regarding this report.    LOCATION:  [id number]   Dictated by (CST): Stromberg, LeRoy, MD on 3/06/2025 at 3:23 PM     Finalized by (CST): Stromberg, LeRoy, MD on 3/06/2025 at 3:36 PM       MRI BRAIN (W+WO) + PERFUSION (CPT=70553)    Result Date: 3/6/2025  PROCEDURE:  MRI BRAIN (W+WO) + PERFUSION (CPT=70553)  COMPARISON:  [facility] , CT, CT BRAIN OR HEAD (07004), 11/16/2024, 9:22 AM.  [facility] , MR, MRA, HEAD (W+WO) (CPT=70546), 3/06/2025, 12:09 PM.  [facility] , MR, MRI BRAIN (CPT=70551), 9/22/2024, 2:39 PM.  INDICATIONS:  I63.89 Cerebrovascular accident (CVA) due to other mechanism (HCC) I67.5 Moyamoya disease  TECHNIQUE:  MRI of the brain was performed with multi-planar T1, T2-weighted images with FLAIR sequences and diffusion weighted images without and with infusion.  PATIENT STATED HISTORY:(As transcribed by Technologist)  Surveillance imaging, no new complaints. Moyamoya disease.  Cerebrovascular accident (CVA) due to other mechanism.   CONTRAST USED:  20 mL of Dotarem   FINDINGS:  No hydrocephalus.  There is no midline shift or mass effect.  The basal cisterns are patent.   There is no acute intracranial hemorrhage or extra-axial fluid collection identified.  There is no restricted diffusion to suggest acute ischemia/infarction.  There is a moderate-sized subacute to chronic infarct within the posterior/lateral left frontal lobe with regions of T2 shine through.  This was noted on the prior brain MRI from 9/22/2024.  There are regions of T1 shortening in this region of prior ischemia which likely represents laminar necrosis.  There are postoperative changes from a left parietal temporal craniotomy for the purposes of extracranial to intracranial bypass.  There is dural thickening/enhancement subjacent to the craniotomy consistent with postoperative changes.  There is cortical gradient susceptibility at the lateral left frontal lobe which may represent a combination of laminar necrosis and superficial siderosis.  There is a punctate chronic infarct at the posterior right cerebellum.  The visualized paranasal sinuses and mastoid air cells are unremarkable.   MR perfusion was performed.  There is diminished relative cerebral blood volume within the region of subacute to chronic ischemia within the left MCA territory.  There is a moderate size region of diminished relative cerebral blood flow throughout portions of the mid and posterior left frontal lobe.  The extent of diminished relative cerebral blood flow appears mildly greater than the extent of diminished relative cerebral blood volume.  There is markedly elevated mean transit time and time to peak within the vascular territories of the left anterior cerebral artery and left middle cerebral artery.            CONCLUSION:  Imaging findings are again consistent with left-sided moyamoya disease with markedly elevated mean transit time and time to peak within the vascular territories of the left anterior cerebral artery and left  middle cerebral artery.  There is a moderate size region of diminished relative cerebral blood flow within portions of the mid and posterior left frontal lobe which may represent some regions of oligemia.  No acute infarct is seen on today's examination.  There is a moderate-sized subacute to chronic infarct within the mid/posterior left frontal lobe.  Postoperative changes are again seen from a left-sided extracranial to intracranial bypass.   The radiology support staff is in the process of contacting the referring physician regarding this report.   LOCATION:  [id number]    Dictated by (CST): Stromberg, LeRoy, MD on 3/06/2025 at 3:02 PM     Finalized by (CST): Stromberg, LeRoy, MD on 3/06/2025 at 3:22 PM         IMPRESSION: Isabella Kevin is a 55 year old Right hand dominant female with left triquetral fracture.     PLAN:   We had a detailed discussion outlining the etiology, anatomy, pathophysiology, and natural history of the patient's findings. Imaging was reviewed in detail and correlated to a 3-dimensional model of the patient's pathology.     We reviewed the treatment of this disease condition.  Fortunately, treatment is amenable to conservative treatment which we chose to optimize at today's visit.  We recommend exos with sleeve, and transition to soft wrist brace in two weeks. We recommended occupational therapy to aid in strengthening, range of motion, functional improvement, and return to baseline activity.  The patient had the opportunity to ask questions and all questions were answered appropriately.      FOLLOW-UP:   6 weeks, or as needed.           Valente Valderrama [facility], PA-C Orthopedic Surgery / Sports Medicine Specialist  [facility]  [address] [place] [address] [id number] [place] [email or web address]  [email or web address]  t: [phone]  o: [phone]  f: [phone]    This note was dictated using Dragon software.  While it was briefly proofread prior to completion, some  grammatical, spelling, and word choice errors due to dictation may still occur.

## 2025-03-27 NOTE — TELEPHONE ENCOUNTER
Spoke with DEB Alcaraz at [facility] who had some specific protocol questions.  Please advise, thank you!    1.  May she begin resistive training at 6 weeks post injury?    2.  Is it ok for her to use a OTC soft wrist cockup?    3. Can she begin weightbearing 6 weeks after injury?      DOI  3/14/25 Closed nondisplaced fracture of triquetrum of left wrist     Ok to leave Lissa a message at [phone].

## 2025-03-28 NOTE — TELEPHONE ENCOUNTER
Valente Valderrama PA to [facility] Rn Pool        3/28/25 11:54 AM  Yes to all questions, thanks!

## 2025-04-01 NOTE — TELEPHONE ENCOUNTER
Occupation therapy notes/orders placed in Dr. Issa's folder for review and signature.   OT Plan: 3 visits over the next 4 weeks.

## 2025-04-11 NOTE — PROGRESS NOTES
HPI:    Patient ID: Isabella Kevin is a 55 year old female.  PCP: Dr Steward      Neurologic Problem  The patient's pertinent negatives include no weakness. Pertinent negatives include no dizziness or headaches.     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 States she is doing the same, no significant improvement in her aphasia, still struggles with words difficulty with conservation. She remains off work.   Had a fall in March 2025 and sustain injury to left wrist  She had MRI brain/MRA head with perfusion on 3/6/25 which shows overall poor flow enhancement throughout the left middle cerebral artery.    Initial history  Zeenat Kevin is a 54 year old female who presented for hospital follow up for Shelby Shelby s/p Left-sided frontotemporal vmjyfxtlv-jwls-xfpnf-synangiosis (EDAS) indirect bypass on 9/13/24 by Dr Guzman.    She initially presented to the hospital 8/27/24 with acute speech and language difficulty. MRI brain demonstrated a left hemisphere focus of infarct.  CTA head and neck demonstrated left ICA terminus steno-occlusive disease consistent with moyamoya.  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 Possible early right sided moyamoya disease with filling of the STEPHANY territory via pial collaterals     Post operatively had episodes of worsening aphasia and facial droop, BP dependent and  started on Midodrine. She continue to have orthostatic hypotension Florinef was added  Repeat mRI brain showed new infarction in the mid left MCA territory. She was discharged home on DAPT    Stable since discharge, BP fluctuates at home. Reports had history of labile hypertension and was on antihypertensive but since the stroke not taking any antihypertensives. She is taking  Midodrine and Florinef as instructed. Water intake is inadequate.   She c/o fatigue, interrupted sleep since she is not taking Xywav ( has hx of idiopathic hypersomnia) intermittent right arm involuntary twitch but no limb shaking or weakness.      HISTORY:  Past Medical History:    ADHD (attention deficit hyperactivity disorder)    Allergic rhinitis    Idiopathic hypersomnia    Osteoporosis    Seasonal affective disorder    Vitamin D deficiency      Past Surgical History:   Procedure Laterality Date    Ablation  01/01/2011    uterus    Brain surgery  09/13/2024    neuro revascularization    Other surgical history  09/13/2024    CRANIOTOMY FOR A LEFT ENCEPHALODUROARTERIOSYNANGIOSIS (EDAS) INDIRECT BYPASS      Family History   Problem Relation Age of Onset    Hypertension Mother     Hypertension Brother     Other (stomach cancer) Paternal Aunt       Social History     Socioeconomic History    Marital status:    Tobacco Use    Smoking status: Never    Smokeless tobacco: Never   Vaping Use    Vaping status: Never Used   Substance and Sexual Activity    Alcohol use: Not Currently    Drug use: Never    Sexual activity: Yes     Partners: Male     Birth control/protection: Surgical     Comment: ablation    Other Topics Concern    Caffeine Concern Yes     Comment: tea    Stress Concern No    Weight Concern No    Special Diet No    Exercise No    Seat Belt Yes     Social Drivers of Health     Food Insecurity: No Food Insecurity (9/12/2024)    Food Insecurity     Food Insecurity: Never true   Transportation Needs: No Transportation Needs (9/12/2024)    Transportation Needs     Lack of Transportation: No   Housing Stability: Low Risk  (9/12/2024)    Housing Stability     Housing Instability: No        Review of Systems   Constitutional: Negative.    HENT: Negative.     Eyes: Negative.    Respiratory: Negative.     Cardiovascular: Negative.    Gastrointestinal: Negative.    Endocrine: Negative.    Genitourinary:  Negative.    Musculoskeletal: Negative.    Skin: Negative.    Allergic/Immunologic: Negative.    Neurological:  Positive for facial asymmetry and speech difficulty. Negative for dizziness, weakness and headaches.   Hematological: Negative.    Psychiatric/Behavioral: Negative.     All other systems reviewed and are negative.           Current Outpatient Medications   Medication Sig Dispense Refill    ASPIRIN 81 MG Oral Chew Tab CHEW AND SWALLOW 1 TABLET(81 MG) BY MOUTH DAILY 90 tablet 0    MIDODRINE 10 MG Oral Tab TAKE 1 TABLET(10 MG) BY MOUTH IN THE MORNING AND AT NOON AND IN THE EVENING 270 tablet 1    FLUDROCORTISONE 0.1 MG Oral Tab TAKE 2 TABLETS(0.2 MG) BY MOUTH DAILY 180 tablet 1    clopidogrel 75 MG Oral Tab Take 1 tablet (75 mg total) by mouth daily. 90 tablet 0    XYWAV 500 MG/ML Oral Solution Take 180 mL by mouth at bedtime.      atorvastatin 40 MG Oral Tab Take 1 tablet (40 mg total) by mouth nightly. 30 tablet 2    estradiol 0.025 MG/24HR Transdermal Patch Biweekly  (Patient not taking: Reported on 4/11/2025)       Allergies:  Allergies   Allergen Reactions    Ciprofloxacin-Hydrocortisone ITCHING and HIVES     Tingling of hands and feet    Ibuprofen SWELLING    Nsaids ANGIOEDEMA    Birch Tar Oil [Betula Alba Oil]      [place]    Modafinil OTHER (SEE COMMENTS)     Pt felt like throat was closing     PHYSICAL EXAM:   Physical Exam    Blood pressure 132/74, pulse 70, resp. rate 16, weight 125 lb (56.7 kg), not currently breastfeeding.  General Appearance: Well nourished, well developed, no apparent distress.     HEENT: Normocephalic and atraumatic. Normal sclera. Moist mucus membrane  Neck: Normal range of motion. Neck supple.  Cardiovascular: Normal rate, regular rhythm and normal heart sounds.    Pulmonary/Chest: Effort normal and breath sounds normal.   Abdominal: Soft. Bowel sounds are normal.   Skin: dry, clean and intact  Ext: peripheral pulses present  Psych: normal mood and affect    Neurological:   Patient is awake, alert and oriented to person, place and time   Mild expressive aphasia and dysarthia. Intact comprehension and naming    Cranial Nerves:   II: Visual acuity: normal   Visual fields: normal  III: Pupils: equal, round, reactive to light  III,IV,VI: Extra Ocular Movements: intact  V: Facial sensation: intact  VII: Facial strength: mild right facial weakness  VIII: Hearing: intact  IX: Palate: intact  XI: Shoulder shrug: intact  XII: Tongue movement: normal    Motor: No drift Strength is  5 out of 5 in all extremities bilaterally.  Sensory: Sensory examination is normal to light touch and pinprick   Coordination: Intact  Gait: Normal casual gait              TESTS/IMAGING:     MRI brain/MRA head w and wo contrast: 3/6/2025      Impression   CONCLUSION:       1. There are postoperative changes from a left-sided extracranial to intracranial bypass.  There is arterial flow related enhancement within a vascular branch at the left-sided craniotomy.  There is flow related enhancement from this internalized vessel  with a vessel at an anterior left temporal lobe sulcus.  However, there is overall poor flow related enhancement throughout the left middle cerebral artery.     2. Sequelae of left-sided moyamoya disease is again noted with probable occlusion at the left carotid terminus, diminutive caliber of the visualized left internal carotid artery, and hypertrophy of the distal left posterior cerebral artery branches  likely secondary to collateralization.     3. The right A1 segment of the anterior cerebral artery is diminutive in caliber.  The left A1 segment is not seen.  There is overall moderate attenuation of the flow related enhancement within the anterior cerebral arteries.  This may represent  developing moyamoya disease at the bilateral anterior cerebral artery circulation.                                 MRI brain: 9/22/2024                   Impression   CONCLUSION:  New acute infarction in the  mid left MCA territory is noted.  This occupies approximately 1/3 of the left MCA territory.           MRI brain 8/27/2024                  Impression   CONCLUSION:  Abnormal brain MRI.  There is a 15 mm focus of restricted diffusion within the posterior left frontal lobe.  There are smaller scattered punctate foci of restriction abnormality within the left central semiovale.  These imaging findings are  suspicious for a 15 mm acute infarct within the posterior left frontal lobe with scattered punctate subacute infarcts within the parasagittal left frontal lobe.  These findings are within the watershed territory of the left middle cerebral artery.  A CTA   of the head and neck is recommended for further assessment.  A demyelinating process cannot be entirely excluded and continued clinical and imaging follow-up is recommended for further assessment.                CTA head and neck    Impression   CONCLUSION:       1. There are stable scattered areas of hypoattenuation in the left frontal lobe there are better characterized on the recent MRI of the brain and suspicious for acute to subacute infarcts.  The largest of these measures up to 16 mm.  Clinical correlation   and continued follow-up is suggested.  No evidence of hemorrhagic transformation.     2. 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  There is asymmetric diminutive caliber of the left internal carotid artery branches.  The left middle cerebral artery branches are asymmetrically diminutive when compared to the   right which is also likely on developmental basis.  There is marked hyperplasia of bilateral anterior cerebral artery A1 segments with markedly diminutive and dysplastic anterior cerebral artery branches the remainder of the territory period there are  wished of small vessels along the bilateral carotid termini.  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  The overall findings likely fall within the spectrum of  moyamoya disease, with other etiologies not entirely excluded.  Clinical correlation  recommended.     3. No evidence of occlusion, dissection, or flow-limiting stenosis in the cervical vertebral or carotid arteries. No evidence of hemodynamically significant carotid stenosis by NASCET criteria.                 ASSESSMENT/PLAN:       ICD-10-CM    1. Cerebrovascular accident (CVA) due to occlusion of left middle cerebral artery (HCC)  I63.512 Lipid Panel      2. 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 Status post encephaloduroarteriomyosynangiosis  Z98.890     Z86.79           Left MCA stroke due to left carotid terminus stenoocclusive disease/Mcclain Mcclain  S/p left-sided frontotemporal cuthxzhey-ofbj-gboor-synangiosis (EDAS) indirect bypass on 9/13/24  MRI brain/MRA head w and wo with perfusion from 3/625 shows stable post operative changes from left sided extracranial to intracranial bypass. Overall poor flow related enhancement throughout left MCA. No new ischemia noted      Plan  Continue Aspirin 81 mg and Clopidrogrel 75 mg daily  BP stable at home. Reduce midodrine  to 5 mg- 3 times daily ( 7 am, 12-1 pm and 6 pm)  Continue Florinef same dose.  Monitor BP at home ( keep ~ 110-130)      Discuss with the patient and her  extensively. Synangiosis may need more time, poor flow seen throughout the left middle cerebral artery.   Patient feels frustrated due to her condition and has underlying depression was on Ketamine and since the stroke hasn't receive any treatment  Explain to the patient that Ketamine infusion may affect the HR and BP hence won't be recommended. Advise to see her therapist, ok to take oral antidepressants.    Patient's  report they are seeking second opinion at other centers [place] or [facility]     Will hold the plan for diagnotic cerebral angiogram , awaiting neurosurgery evaluation at [place].          Davis Issa MD   [facility] [place]      This note was  prepared using Dragon Medical voice recognition dictation software. As a result errors may occur. When identified these errors have been corrected. While every attempt is made to correct errors during dictation discrepancies may still exist         Meds This Visit:  Requested Prescriptions      No prescriptions requested or ordered in this encounter       Imaging & Referrals:  None     ID#[id number]

## 2025-04-11 NOTE — PATIENT INSTRUCTIONS
Midodrine - 5 mg TID ( 7, 12 and 6 pm)  Keep -130      2. Once BP stable, cut down Florinef 0.1 mg      3.  Check lipid panel      Refill policies:    Allow 2-3 business days for refills; controlled substances may take longer.  Contact your pharmacy at least 5 days prior to running out of medication and have them send an electronic request or submit request through the “request refill” option in your [employer] account.  Refills are not addressed on weekends; covering physicians do not authorize routine medications on weekends.  No narcotics or controlled substances are refilled after noon on Fridays or by on call physicians.  By law, narcotics must be electronically prescribed.  A 30 day supply with no refills is the maximum allowed.  If your prescription is due for a refill, you may be due for a follow up appointment.  To best provide you care, patients receiving routine medications need to be seen at least once a year.  Patients receiving narcotic/controlled substance medications need to be seen at least once every 3 months.  In the event that your preferred pharmacy does not have the requested medication in stock (e.g. Backordered), it is your responsibility to find another pharmacy that has the requested medication available.  We will gladly send a new prescription to that pharmacy at your request.    Scheduling Tests:    If your physician has ordered radiology tests such as MRI or CT scans, please contact Central Scheduling at [phone] right away to schedule the test.  Once scheduled, the [facility] Centralized Referral Team will work with your insurance carrier to obtain pre-certification or prior authorization.  Depending on your insurance carrier, approval may take 3-10 days.  It is highly recommended patients assure they have received an authorization before having a test performed.  If test is done without insurance authorization, patient may be responsible for the entire amount billed.       Precertification and Prior Authorizations:  If your physician has recommended that you have a procedure or additional testing performed the [facility] Centralized Referral Team will contact your insurance carrier to obtain pre-certification or prior authorization.    You are strongly encouraged to contact your insurance carrier to verify that your procedure/test has been approved and is a COVERED benefit.  Although the [facility] Centralized Referral Team does its due diligence, the insurance carrier gives the disclaimer that \"Although the procedure is authorized, this does not guarantee payment.\"    Ultimately the patient is responsible for payment.   Thank you for your understanding in this matter.  Paperwork Completion:  If you require FMLA or disability paperwork for your recovery, please make sure to either drop it off or have it faxed to our office at [phone]. Be sure the form has your name and date of birth on it.  The form will be faxed to our Forms Department and they will complete it for you.  There is a 25$ fee for all forms that need to be filled out.  Please be aware there is a 10-14 day turnaround time.  You will need to sign a release of information (JERAMY) form if your paperwork does not come with one.  You may call the Forms Department with any questions at [phone].  Their fax number is [phone].

## 2025-04-29 NOTE — TELEPHONE ENCOUNTER
Physical therapy notes placed in Dr Issa's folder for review and signature.  PT Plan : 2 times/week for 6 visits, followed by 1x/week for 4 visits for a total of 12 visits.

## 2025-04-30 NOTE — PROGRESS NOTES
[facility] - ORTHOPEDICS  [address] [place] [id number]  [phone]       Name: Isabella King   MRN: [id number]  Date: 4/30/2025     REASON FOR VISIT: Follow up for  left triquetral fracture.     INTERVAL HISTORY:  Isabella Kevin is a 55 year old female who returns for evaluation of  left triquetral fracture.  At the patient's last visit we recommended Exos with sleeve and transition to soft wrist brace as well as occupational therapy.  She presents today with no pain who recently started occupational therapy.  Overall doing well.      ROS: ROS    PE:   There were no vitals filed for this visit.  Estimated body mass index is 22.86 kg/m² as calculated from the following:    Height as of 3/18/25: 5' 2\" (1.575 m).    Weight as of 4/11/25: 125 lb (56.7 kg).    Physical Exam  Constitutional:       Appearance: Normal appearance.   HENT:      Head: Normocephalic and atraumatic.   Eyes:      Extraocular Movements: Extraocular movements intact.   Neck:      Musculoskeletal: Normal range of motion and neck supple.   Cardiovascular:      Pulses: Normal pulses.   Pulmonary:      Effort: Pulmonary effort is normal. No respiratory distress.   Abdominal:      General: There is no distension.   Skin:     General: Skin is warm.      Capillary Refill: Capillary refill takes less than 2 seconds.      Findings: No bruising.   Neurological:      General: No focal deficit present.      Mental Status: She is alert.   Psychiatric:         Mood and Affect: Mood normal.     Examination of the left hand/wrist demonstrates:     Skin is intact, warm and dry.     Inspection:   Atrophy: none    Effusion: none    Edema: none    Erythema: none    Hematoma: none    Nail changes:  none    Deformities: none      Palpation:   Radius: none    Ulna: none    Scaphoid: none    Lunate:  none    Triquetrum: none    Pisiform: none    Trapezium: none    Trapezoid: none    Capitate: none  Hamate: none    DRUJ: none   Dorsal Wrist: none   Isabel Wrist: none   Metacarpals none   DIP: none   PIP: none     ROM:   Wrist: Full and symmetric   Fingers: Full and symmetric     Strength: normal     Special Tests:   Median Nerve: Negative  Ulnar Nerve: Negative   Radial Nerve:  Negative     No obvious peripheral edema noted.   Distal neurovascular exam demonstrates normal perfusion, intact sensation to light touch and full strength.         Radiographic Examination/Diagnostics:    I personally viewed, independently interpreted and radiology report was reviewed.    No results found.    IMPRESSION: Isabella Kevin is a 55 year old female who presented for follow up of left triquetral fracture.     PLAN:   We had a detailed discussion outlining the etiology, anatomy, pathophysiology, and natural history of the patient's findings.    We reviewed the treatment of this disease condition.  Fortunately, treatment is amenable to conservative treatment which we chose to optimize at today's visit.      We recommended occupational therapy to aid in strengthening, range of motion, functional improvement, and return to baseline activity.  The patient had opportunity to ask questions and all questions were answered appropriately.    FOLLOW-UP:  Return to clinic on an as needed basis.             Valente Valderrama [facility], PA-C Orthopedic Surgery / Sports Medicine Specialist  [facility]  [address] [place] [address] [id number] [place] [email or web address].org  [email or web address]  t: [phone]  o: [phone]  f: [phone]    This note was dictated using Dragon software.  While it was briefly proofread prior to completion, some grammatical, spelling, and word choice errors due to dictation may still occur.

## 2025-05-05 NOTE — TELEPHONE ENCOUNTER
From Melinda Maciel--    Plan:  1. Overall, she has had a revascularization procedure and has not had recurrent events after the September event  2. I expect continue recovery of her speech  3. I think coming off of Plavix, Fludrocortisone, and Minodrine sequentially would be reasonable  4. A delayed angiogram to assess the indirect bypass could be considered  5. Further revascularization with negra holes, frontal branch anastomosis could be considered  6. They are getting further opinions through [facility]

## 2025-05-13 NOTE — TELEPHONE ENCOUNTER
Hi, any insight as to Dr Steele assuming care? Are you willing to see pt prior to 6/11 to discuss?     LOV 3/11/25:   \"Plan:  - Continue antiplatelet therapy per neurology  - MRI/MRA brain with and without contrast with perfusion in 6 months  - Will determine plan for diagnostic cerebral angiogram after repeat imaging  - Follow-up with Dr. Issa as planned for secondary stroke prevention as scheduled\"

## 2025-05-19 NOTE — TELEPHONE ENCOUNTER
Medication: Atorvastatin     Date of last refill: 8/28/2024 (#30/2) and IP  [place]      Last office visit: 4/11/2025  Due back to clinic per last office note:  3 months  Date next office visit scheduled:    Future Appointments   Date Time Provider Department Center   7/11/2025  9:50 AM Davis Issa MD [facility]           Last OV note recommendation:    Continue Aspirin 81 mg and Clopidrogrel 75 mg daily  BP stable at home. Reduce midodrine  to 5 mg- 3 times daily ( 7 am, 12-1 pm and 6 pm)  Continue Florinef same dose.  Monitor BP at home ( keep ~ 110-130)        Discuss with the patient and her  extensively. Synangiosis may need more time, poor flow seen throughout the left middle cerebral artery.   Patient feels frustrated due to her condition and has underlying depression was on Ketamine and since the stroke hasn't receive any treatment  Explain to the patient that Ketamine infusion may affect the HR and BP hence won't be recommended. Advise to see her therapist, ok to take oral antidepressants.     Patient's  report they are seeking second opinion at other centers [place] or [facility].      Will hold the plan for diagnotic cerebral angiogram , awaiting neurosurgery evaluation at [place].    Return in about 3 months (around 7/11/2025).

## 2025-05-20 NOTE — TELEPHONE ENCOUNTER
Spoke with the pharmacy who states that the patient insurance is requesting a 90 day supply.       Medication: ATORVASTATIN 20 MG Oral Tab      Date of last refill: 05/19/2025 (#30/1)  [place]     Last office visit: 04/11/2025  Due back to clinic per last office note:  Around 07/11/2025  Date next office visit scheduled:    Future Appointments   Date Time Provider Department Center   7/11/2025  9:50 AM Davis Issa MD [facility]           Last OV note recommendation:    ASSESSMENT/PLAN:          ICD-10-CM     1. Cerebrovascular accident (CVA) due to occlusion of left middle cerebral artery (HCC)  I63.512 Lipid Panel       2. 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 Status post encephaloduroarteriomyosynangiosis  Z98.890       Z86.79               Left MCA stroke due to left carotid terminus stenoocclusive disease/Mcclain Mcclain  S/p left-sided frontotemporal drmuhplxa-xesc-yybrt-synangiosis (EDAS) indirect bypass on 9/13/24  MRI brain/MRA head w and wo with perfusion from 3/625 shows stable post operative changes from left sided extracranial to intracranial bypass. Overall poor flow related enhancement throughout left MCA. No new ischemia noted        Plan  Continue Aspirin 81 mg and Clopidrogrel 75 mg daily  BP stable at home. Reduce midodrine  to 5 mg- 3 times daily ( 7 am, 12-1 pm and 6 pm)  Continue Florinef same dose.  Monitor BP at home ( keep ~ 110-130)        Discuss with the patient and her  extensively. Synangiosis may need more time, poor flow seen throughout the left middle cerebral artery.   Patient feels frustrated due to her condition and has underlying depression was on Ketamine and since the stroke hasn't receive any treatment  Explain to the patient that Ketamine infusion may affect the HR and BP hence won't be recommended. Advise to see her therapist, ok to take oral antidepressants.     Patient's  report they are seeking second opinion  at other centers [place] or [facility].      Will hold the plan for diagnotic cerebral angiogram , awaiting neurosurgery evaluation at [place].             Davis Issa MD   [facility] [place]

## 2025-05-22 NOTE — TELEPHONE ENCOUNTER
Medication: ASPIRIN 81 MG Oral Chew Tab      Date of last refill: 02/21/2025 (#90/0)  [place]     Last office visit: 04/11/2025  Due back to clinic per last office note:  Around 07/11/2025  Date next office visit scheduled:    Future Appointments   Date Time Provider Department Center   5/27/2025 10:00 AM Cornelio Guzman MD [facility]AP[facility]   7/11/2025  9:50 AM Davis Issa MD [facility]           Last OV note recommendation:    ASSESSMENT/PLAN:          ICD-10-CM     1. Cerebrovascular accident (CVA) due to occlusion of left middle cerebral artery (HCC)  I63.512 Lipid Panel       2. 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 Status post encephaloduroarteriomyosynangiosis  Z98.890       Z86.79               Left MCA stroke due to left carotid terminus stenoocclusive disease/Mcclain Mcclain  S/p left-sided frontotemporal tiykyavai-axql-phzyn-synangiosis (EDAS) indirect bypass on 9/13/24  MRI brain/MRA head w and wo with perfusion from 3/625 shows stable post operative changes from left sided extracranial to intracranial bypass. Overall poor flow related enhancement throughout left MCA. No new ischemia noted        Plan  Continue Aspirin 81 mg and Clopidrogrel 75 mg daily  BP stable at home. Reduce midodrine  to 5 mg- 3 times daily ( 7 am, 12-1 pm and 6 pm)  Continue Florinef same dose.  Monitor BP at home ( keep ~ 110-130)        Discuss with the patient and her  extensively. Synangiosis may need more time, poor flow seen throughout the left middle cerebral artery.   Patient feels frustrated due to her condition and has underlying depression was on Ketamine and since the stroke hasn't receive any treatment  Explain to the patient that Ketamine infusion may affect the HR and BP hence won't be recommended. Advise to see her therapist, ok to take oral antidepressants.     Patient's  report they are seeking second opinion at other centers [place] or [place]  clinic.      Will hold the plan for diagnotic cerebral angiogram , awaiting neurosurgery evaluation at [place].             Davis Issa MD   [facility] [place]

## 2025-05-22 NOTE — TELEPHONE ENCOUNTER
Medication: MIDODRINE 10 MG Oral Tab + ASPIRIN 81 MG Oral Chew Tab     Date of last refill: 02/18/2025 (#270/1) + 02/21/2025 (#90/0)  [place]     Last office visit: 04/11/2025  Due back to clinic per last office note:  Around 07/11/2025  Date next office visit scheduled:    Future Appointments   Date Time Provider Department Center   5/27/2025 10:00 AM Cornelio Guzman MD [facility]   7/11/2025  9:50 AM Davis Issa MD [facility]           Last OV note recommendation:    ASSESSMENT/PLAN:          ICD-10-CM     1. Cerebrovascular accident (CVA) due to occlusion of left middle cerebral artery (HCC)  I63.512 Lipid Panel       2. 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 Status post encephaloduroarteriomyosynangiosis  Z98.890       Z86.79               Left MCA stroke due to left carotid terminus stenoocclusive disease/Mcclain Mcclain  S/p left-sided frontotemporal bfcfkugsy-zjhx-fxlhl-synangiosis (EDAS) indirect bypass on 9/13/24  MRI brain/MRA head w and wo with perfusion from 3/625 shows stable post operative changes from left sided extracranial to intracranial bypass. Overall poor flow related enhancement throughout left MCA. No new ischemia noted        Plan  Continue Aspirin 81 mg and Clopidrogrel 75 mg daily  BP stable at home. Reduce midodrine  to 5 mg- 3 times daily ( 7 am, 12-1 pm and 6 pm)  Continue Florinef same dose.  Monitor BP at home ( keep ~ 110-130)        Discuss with the patient and her  extensively. Synangiosis may need more time, poor flow seen throughout the left middle cerebral artery.   Patient feels frustrated due to her condition and has underlying depression was on Ketamine and since the stroke hasn't receive any treatment  Explain to the patient that Ketamine infusion may affect the HR and BP hence won't be recommended. Advise to see her therapist, ok to take oral antidepressants.     Patient's  report they are seeking  second opinion at other centers [place] or [facility].      Will hold the plan for diagnotic cerebral angiogram , awaiting neurosurgery evaluation at [place].             Davis Issa MD   [facility] [place]

## 2025-05-28 NOTE — PROGRESS NOTES
[facility]  Neurological Surgery Clinic Note    Isabella Kevin  1/4/1970  [id number]  PCP: Mildred Steward MD    REASON FOR VISIT:  Symptomatic left-sided moyamoya disease status post left-sided EDAS on 9/13/24     HISTORY OF PRESENT ILLNESS:  Isabella Kevin is a 55 year old female who originally presented on 8/27/24 with acute slurred speech and language difficulty.  She denied any right sided numbness or weakness.  MRI brain demonstrated a left hemisphere focus of infarct.  CTA head and neck demonstrated left ICA terminus steno-occlusive disease consistent with moyamoya.  She has been started on aspirin and Plavix by neurology.  She has a history of labile blood pressures for which she is on medication. She presented for diagnostic angiogram on 9/3/24. Prior to procedure she was noted to have AMS, slurred speech and HR was 30s with SBP 60s. Symptoms improved with IVF and pressors and angiogram was completed. She then presented on 9/13/24 for a left sided frontotemporal egtplapka-fsrx-ooqoebx-synangiosis (EDAS) indirect bypass on 9/13/24. She was admitted to the neurocritical care unit post-operatively for close neurological monitoring. She was noted post-op to have aphasia and right sided weakness. She was found to be pressure dependent and started on Evelio gtt to increase BP. She was trialed titrating off the Evelio gtt and found to be symptomatic at lower SBP towards 120s and was started on Midodrine and then Florinef to optimize SBP with improvement in her symptoms. She was resumed on ASA post-op. Later, Plavix was started. She had a post-op MRI performed on 9/29/24 that revealed a new mid L MCA infarct. She worked with therapies and was recommended for outpatient speech therapy. She was deemed stable for discharge home on 9/25.      On 9/26/24, patient's spouse called our office with concerns of speech changes and low SBP in the 110s. BP improved  with Florineff and Midodrine. Symptoms were resolved later in the day. Patient was added on to see Dr Issa prior to this appointment and her medications were adjusted and patient was educated on the importance of adequate hydration and other methods like compression stockings. Today, she presents for a wound check.     Interval history 11/7/2024  She reports continued language difficulty since surgery, although this has been slowly improving.  She has been working with speech-language pathology for speech therapy.  She reports persistent left-sided headaches that have been slightly worsening recently.  She continues on aspirin daily.  She was recently out hiking and reports some mild right sided leg weakness.  She has some stable right lower face weakness.    Interval history 3/11/2025  She reports continued improvement in her expressive aphasia and is working with speech therapy.  She continues to take aspirin and Plavix daily.  She denies any recurrent stroke symptoms.  MRI/MRA brain with perfusion 3/6/2025 demonstrates left hemisphere elevated mean transit time and time to peak as well as a moderate region of diminished cerebral blood flow concerning for ongoing oligemia without evidence of acute stroke, expected postoperative changes from the left-sided EDAS, and relatively stable right A1 segment narrowing.    Interval history 5/27/2025  The patient and her  present to clinic for routine follow-up.  They report no recurrent stroke symptoms.  They were report frustration with the persistent expressive aphasia and she has been working hard with therapy to continue to progress her recovery.  They have reached out to Dr. Steele at [place] and have scheduled surgery in a few weeks.  They sought another opinion by Dr. Monterroso at [place] in April, at which time they discontinued Plavix, and that she has continued aspirin    PAST MEDICAL HISTORY:  Past Medical History:    ADHD (attention deficit  hyperactivity disorder)    Allergic rhinitis    Idiopathic hypersomnia    Osteoporosis    Seasonal affective disorder    Vitamin D deficiency       PAST SURGICAL HISTORY:  Past Surgical History:   Procedure Laterality Date    Ablation  01/01/2011    uterus    Brain surgery  09/13/2024    neuro revascularization    Other surgical history  09/13/2024    CRANIOTOMY FOR A LEFT ENCEPHALODUROARTERIOSYNANGIOSIS (EDAS) INDIRECT BYPASS       FAMILY HISTORY:  family history includes Hypertension in her brother and mother; stomach cancer in her paternal aunt.    SOCIAL HISTORY:   reports that she has never smoked. She has never used smokeless tobacco. She reports that she does not currently use alcohol. She reports that she does not use drugs.    ALLERGIES:  Allergies[1]    MEDICATIONS:  Medications Ordered Prior to Encounter[2]    REVIEW OF SYSTEMS:  A 10-point system was reviewed.  Pertinent positives and negatives are noted in HPI.      PHYSICAL EXAMINATION:  VITAL SIGNS: /76   Pulse 83     A&Ox3 with significant expressive aphasia  PERRL, EOMi  Right sided facial droop  Full strength x 4, no drift  Incision healing well    ASSESSMENT:  56 yo female with symptomatic left-sided moyamoya disease status post left-sided EDAS on 9/13/24    I spoke with the patient and her  regarding the current clinical situation and plan of care.  We discussed the signs and symptoms for which to seek emergent medical attention.  We discussed the modifiable risk factors for recurrent stroke.  We discussed the natural history of moyamoya disease.  I explained that given the MRI findings in March, I would like to give more time for synangiosis prior to repeat angiography.  We discussed the pros and cons of evaluation by Dr. Steele at [place].  I explained that I do not think further revascularization surgery would accelerate her neurological recovery, but I expressed that it is not unreasonable to get other perspectives.  I did  explain that repeat angiography is necessary before considering any further surgery.  I also explained that additional opinions may be sought that would be available locally.  After this discussion and answering their questions, they expressed understanding and appreciation.    Plan:  - They will consider obtaining further opinions from other neurosurgeons.  I reached out to Dr. Zuniga at [facility] at their request to facilitate appointment which he expressed that he will coordinate.  -Pending the results of their decision, if they would like to continue their care with me, we will plan for an another MRI/MRA brain with and without contrast with perfusion in September then we will determine plan for diagnostic cerebral angiogram after repeat imaging  - Follow-up with Dr. Issa as planned for secondary stroke prevention as scheduled    Cornelio Guzman MD  Neurological Surgery  [facility] Time: 60 min including face to face time, chart review, imaging interpretation, and coordination of care         [1]   Allergies  Allergen Reactions    Ciprofloxacin-Hydrocortisone ITCHING and HIVES     Tingling of hands and feet    Ibuprofen SWELLING    Nsaids ANGIOEDEMA    Birch Tar Oil [Betula Alba Oil]      [place]    Modafinil OTHER (SEE COMMENTS)     Pt felt like throat was closing   [2]   Current Outpatient Medications on File Prior to Visit   Medication Sig Dispense Refill    Calcium-Phosphorus-Vitamin D (CALCIUM/VITAMIN D3/ADULT GUMMY) 250-100-500 MG-MG-UNIT Oral Chew Tab Chew 2 Units by mouth daily.      midodrine 5 MG Oral Tab Take 1 tablet (5 mg total) by mouth 3 (three) times daily. 90 tablet 5    aspirin 81 MG Oral Chew Tab Chew 1 tablet (81 mg total) by mouth daily. 90 tablet 5    ATORVASTATIN 20 MG Oral Tab TAKE 1 TABLET(20 MG) BY MOUTH EVERY NIGHT 90 tablet 0    estradiol 0.025 MG/24HR Transdermal Patch Biweekly       XYWAV 500 MG/ML Oral Solution Take 180 mL  by mouth at bedtime.       No current facility-administered medications on file prior to visit.

## 2025-07-10 NOTE — PROGRESS NOTES
[facility]  Neurological Surgery Clinic Note    Isabella Kevin  1/4/1970  [id number]  PCP: Mildred Steward MD    REASON FOR VISIT:  Follow up    S/P Left-sided frontotemporal obrunmhpn-epoa-upmym-synangiosis (EDAS) indirect bypass on 9/13/24   S/p indirect revascularization to her Lt STEPHANY territory via a Lt craniotomy and pericranial graft       HISTORY OF PRESENT ILLNESS:  Isabella Kevin is a very pleasant 55 year old female who originally presented on 8/27/24 with acute slurred speech and language difficulty.  She denied any right sided numbness or weakness.  MRI brain demonstrated a left hemisphere focus of infarct.  CTA head and neck demonstrated left ICA terminus steno-occlusive disease consistent with moyamoya.  She has been started on aspirin and Plavix by neurology.  She has a history of labile blood pressures for which she is on medication. She presented for diagnostic angiogram on 9/3/24. Prior to procedure she was noted to have AMS, slurred speech and HR was 30s with SBP 60s. Symptoms improved with IVF and pressors and angiogram was completed. She then presented on 9/13/24 for a left sided frontotemporal eryaswpaz-iabn-ubwsa-synangiosis (EDAS) indirect bypass. She was admitted to the neurocritical care unit post-operatively for close neurological monitoring. She was noted post-op to have aphasia and right sided weakness. She was found to be pressure dependent and started on Evelio gtt to increase BP. She was trialed titrating off the Evelio gtt and found to be symptomatic at lower SBP towards 120s and was started on Midodrine and then Florinef to optimize SBP with improvement in her symptoms. She was resumed on ASA post-op. Later, Plavix was started. She had a post-op MRI performed on 9/29/24 that revealed a new mid L MCA infarct. She worked with therapies and was recommended for outpatient speech therapy. She was deemed stable for discharge home on 9/25.      On 9/26/24,  patient's spouse called our office with concerns of speech changes and low SBP in the 110s. BP improved with Florineff and Midodrine. Symptoms were resolved later in the day. Patient was added on to see Dr Issa prior to this appointment and her medications were adjusted and patient was educated on the importance of adequate hydration and other methods like compression stockings. Today, she presents for a wound check.      Interval History 9/27/24:  She is starting SLP on Monday. No concerns for any surgical site infection.    Interval history 11/7/2024  She reports continued language difficulty since surgery, although this has been slowly improving.  She has been working with speech-language pathology for speech therapy.  She reports persistent left-sided headaches that have been slightly worsening recently.  She continues on aspirin daily.  She was recently out hiking and reports some mild right sided leg weakness.  She has some stable right lower face weakness.     Interval history 3/11/2025  She reports continued improvement in her expressive aphasia and is working with speech therapy.  She continues to take aspirin and Plavix daily.  She denies any recurrent stroke symptoms.  MRI/MRA brain with perfusion 3/6/2025 demonstrates left hemisphere elevated mean transit time and time to peak as well as a moderate region of diminished cerebral blood flow concerning for ongoing oligemia without evidence of acute stroke, expected postoperative changes from the left-sided EDAS, and relatively stable right A1 segment narrowing.     Interval history 5/27/2025  The patient and her  present to clinic for routine follow-up.  They report no recurrent stroke symptoms.  They were report frustration with the persistent expressive aphasia and she has been working hard with therapy to continue to progress her recovery.  They have reached out to Dr. Steele at [place] and have scheduled surgery in a few weeks.  They  sought another opinion by Dr. Monterroso at [place] in April, at which time they discontinued Plavix, and that she has continued aspirin.    Interval history 7/10/25:  DSA from June 14, 2025, demonstrated occlusion of the left supraclinoid internal carotid artery with a patent left indirect bypass supplying only about 40% of the left MCA territory, but with the remainder of the MCA on the left supplied from the posterior circulation collaterals. She had poor filling of the anterior and mid left STEPHANY distribution. On the right side, she had a right A1 occlusion with moyamoya vessels but filled the right STEPHANY distribution from leptomeningeal collaterals from the right MCA injection. MR head scan from June 13, 2025, demonstrated multiple strokes in the left MCA territory, and MR perfusion showed delayed perfusion in the left MCA territory, particularly in the area of the infarcts but also decreased perfusion in the left STEPHANY territory with less than normal augmentation after Diamox in the left STEPHANY territory compared to the right side. Patient consulted with Dr Steele at [facility] and underwent indirect revascularization to her Lt STEPHANY territory via a Lt craniotomy and pericranial graft on 6/18/25. She was seen for a post op visit on 6/24/25 and was recommended to have an MRI with and without contrast and Diamox perfusion as well as cerebral angiogram 6 months post her surgery. She presents today for a wound check and suture removal.     PAST MEDICAL HISTORY:  Past Medical History[1]    PAST SURGICAL HISTORY:  Past Surgical History[2]    FAMILY HISTORY:  family history includes Hypertension in her brother and mother; stomach cancer in her paternal aunt.    SOCIAL HISTORY:   reports that she has never smoked. She has never used smokeless tobacco. She reports that she does not currently use alcohol. She reports that she does not use drugs.    ALLERGIES:  Allergies[3]    MEDICATIONS:  Medications Ordered Prior to  Encounter[4]    REVIEW OF SYSTEMS:  A 10-point system was reviewed.  Pertinent positives and negatives are noted in HPI.      PHYSICAL EXAMINATION:  VITAL SIGNS: Pulse 90   Ht 62\"   Wt 125 lb (56.7 kg)   BMI 22.86 kg/m²     A&Ox3, no acute distress  PERRL, EOMi, FS, TM  Full strength x 4, no drift  Sensation intact   Frontal surgical site with sutures - removed                ASSESSMENT:  54 yo female with symptomatic left-sided moyamoya disease status post left-sided EDAS on 9/13/24   Now s/p indirect revascularization to her Lt STEPHANY territory via a Lt craniotomy and pericranial graft on 6/18/25    Plan:  MRI brain w/wo and MR perfusion  MRA brain VWI  Complete imaging in December  Follow up with Dr Guzman to discuss follow up angiogram    We reviewed stroke symptoms and when to go to the ED. The patient was instructed to continue healthy lifestyle habits to include not smoking, managing blood pressure, healthy eating and regular exercise.    All questions were answered and the patient was instructed to call or message with any additional questions or concerns.     ARLENE Matias, CNP  Neurological Surgery  [facility]    Care Time: 30 min including face to face time, chart review, imaging interpretation, and coordination of care         [1]   Past Medical History:   ADHD (attention deficit hyperactivity disorder)    Allergic rhinitis    Idiopathic hypersomnia    Osteoporosis    Seasonal affective disorder    Vitamin D deficiency   [2]   Past Surgical History:  Procedure Laterality Date    Ablation  01/01/2011    uterus    Brain surgery  09/13/2024    neuro revascularization    Other surgical history  09/13/2024    CRANIOTOMY FOR A LEFT ENCEPHALODUROARTERIOSYNANGIOSIS (EDAS) INDIRECT BYPASS   [3]   Allergies  Allergen Reactions    Ciprofloxacin-Hydrocortisone ITCHING and HIVES     Tingling of hands and feet    Ibuprofen SWELLING    Nsaids ANGIOEDEMA    Birch Tar Oil [Betula Alba Oil]       [place]    Modafinil OTHER (SEE COMMENTS)     Pt felt like throat was closing   [4]   Current Outpatient Medications on File Prior to Visit   Medication Sig Dispense Refill    acetaminophen 325 MG Oral Tab Take 2 tablets (650 mg total) by mouth.      alendronate 70 MG Oral Tab Take 1 tablet (70 mg total) by mouth.      dexamethasone 1 MG Oral Tab TAKE 1 TABLET (1 MG TOTAL) BY MOUTH EVERY 12 HOURS FOR 2 DOSES      Calcium-Phosphorus-Vitamin D (CALCIUM/VITAMIN D3/ADULT GUMMY) 250-100-500 MG-MG-UNIT Oral Chew Tab Chew 2 Units by mouth daily.      aspirin 81 MG Oral Chew Tab Chew 1 tablet (81 mg total) by mouth daily. 90 tablet 5    ATORVASTATIN 20 MG Oral Tab TAKE 1 TABLET(20 MG) BY MOUTH EVERY NIGHT 90 tablet 0     No current facility-administered medications on file prior to visit.

## 2025-07-10 NOTE — PATIENT INSTRUCTIONS
Refill policies:    Allow 2-3 business days for refills; controlled substances may take longer.  Contact your pharmacy at least 5 days prior to running out of medication and have them send an electronic request or submit request through the “request refill” option in your D[employer] account.  Refills are not addressed on weekends; covering physicians do not authorize routine medications on weekends.  No narcotics or controlled substances are refilled after noon on Fridays or by on call physicians.  By law, narcotics must be electronically prescribed.  A 30 day supply with no refills is the maximum allowed.  If your prescription is due for a refill, you may be due for a follow up appointment.  To best provide you care, patients receiving routine medications need to be seen at least once a year.  Patients receiving narcotic/controlled substance medications need to be seen at least once every 3 months.  In the event that your preferred pharmacy does not have the requested medication in stock (e.g. Backordered), it is your responsibility to find another pharmacy that has the requested medication available.  We will gladly send a new prescription to that pharmacy at your request.    Scheduling Tests:    If your physician has ordered radiology tests such as MRI or CT scans, please contact Central Scheduling at [phone] right away to schedule the test.  Once scheduled, the [facility] Centralized Referral Team will work with your insurance carrier to obtain pre-certification or prior authorization.  Depending on your insurance carrier, approval may take 3-10 days.  It is highly recommended patients assure they have received an authorization before having a test performed.  If test is done without insurance authorization, patient may be responsible for the entire amount billed.      Precertification and Prior Authorizations:  If your physician has recommended that you have a procedure or additional testing performed the [facility]  Centralized Referral Team will contact your insurance carrier to obtain pre-certification or prior authorization.    You are strongly encouraged to contact your insurance carrier to verify that your procedure/test has been approved and is a COVERED benefit.  Although the [facility] Centralized Referral Team does its due diligence, the insurance carrier gives the disclaimer that \"Although the procedure is authorized, this does not guarantee payment.\"    Ultimately the patient is responsible for payment.   Thank you for your understanding in this matter.  Paperwork Completion:  If you require FMLA or disability paperwork for your recovery, please make sure to either drop it off or have it faxed to our office at [phone]. Be sure the form has your name and date of birth on it.  The form will be faxed to our Forms Department and they will complete it for you.  There is a 25$ fee for all forms that need to be filled out.  Please be aware there is a 10-14 day turnaround time.  You will need to sign a release of information (JERAMY) form if your paperwork does not come with one.  You may call the Forms Department with any questions at [phone].  Their fax number is [phone].

## 2025-07-11 NOTE — PATIENT INSTRUCTIONS
Refill policies:    Allow 2-3 business days for refills; controlled substances may take longer.  Contact your pharmacy at least 5 days prior to running out of medication and have them send an electronic request or submit request through the “request refill” option in your [employer] account.  Refills are not addressed on weekends; covering physicians do not authorize routine medications on weekends.  No narcotics or controlled substances are refilled after noon on Fridays or by on call physicians.  By law, narcotics must be electronically prescribed.  A 30 day supply with no refills is the maximum allowed.  If your prescription is due for a refill, you may be due for a follow up appointment.  To best provide you care, patients receiving routine medications need to be seen at least once a year.  Patients receiving narcotic/controlled substance medications need to be seen at least once every 3 months.  In the event that your preferred pharmacy does not have the requested medication in stock (e.g. Backordered), it is your responsibility to find another pharmacy that has the requested medication available.  We will gladly send a new prescription to that pharmacy at your request.    Scheduling Tests:    If your physician has ordered radiology tests such as MRI or CT scans, please contact Central Scheduling at [phone] right away to schedule the test.  Once scheduled, the [facility] Centralized Referral Team will work with your insurance carrier to obtain pre-certification or prior authorization.  Depending on your insurance carrier, approval may take 3-10 days.  It is highly recommended patients assure they have received an authorization before having a test performed.  If test is done without insurance authorization, patient may be responsible for the entire amount billed.      Precertification and Prior Authorizations:  If your physician has recommended that you have a procedure or additional testing performed the [facility]  Centralized Referral Team will contact your insurance carrier to obtain pre-certification or prior authorization.    You are strongly encouraged to contact your insurance carrier to verify that your procedure/test has been approved and is a COVERED benefit.  Although the [facility] Centralized Referral Team does its due diligence, the insurance carrier gives the disclaimer that \"Although the procedure is authorized, this does not guarantee payment.\"    Ultimately the patient is responsible for payment.   Thank you for your understanding in this matter.  Paperwork Completion:  If you require FMLA or disability paperwork for your recovery, please make sure to either drop it off or have it faxed to our office at [phone]. Be sure the form has your name and date of birth on it.  The form will be faxed to our Forms Department and they will complete it for you.  There is a 25$ fee for all forms that need to be filled out.  Please be aware there is a 10-14 day turnaround time.  You will need to sign a release of information (JERAMY) form if your paperwork does not come with one.  You may call the Forms Department with any questions at [phone].  Their fax number is [phone].

## 2025-07-11 NOTE — PROGRESS NOTES
HPI:    Patient ID: Isabella Kevin is a 55 year old female.  PCP: Dr Steward      Neurologic Problem  The patient's pertinent negatives include no weakness. Pertinent negatives include no dizziness or headaches.         Briana Kevin is a 55 year old female who presents for follow up for Shelby Shelby disease   S/P Left-sided frontotemporal atyfaxjis-hace-eazco-synangiosis (EDAS) indirect bypass on 9/13/24  at [facility] and  S/p indirect revascularization to her Lt STEPHANY territory via a Lt craniotomy and pericranial graft on 6/18/2025 at [facility].     She saw Dr Steele, DSA from June 14, 2025, demonstrated occlusion of the left supraclinoid internal carotid artery with a patent left indirect bypass supplying only about 40% of the left MCA territory, but with the remainder of the MCA on the left supplied from the posterior circulation collaterals. She had poor filling of the anterior and mid left STEPHANY distribution. On the right side, she had a right A1 occlusion with moyamoya vessels but filled the right STEPHANY distribution from leptomeningeal collaterals from the right MCA injection. MR head scan from June 13, 2025, demonstrated multiple strokes in the left MCA territory, and MR perfusion showed delayed perfusion in the left MCA territory, particularly in the area of the infarcts but also decreased perfusion in the left STEPHANY territory with less than normal augmentation after Diamox in the left STEPHANY territory compared to the right side.   Surgery went well and post surgical MRI brain was stable  States some improvement in her speech. Still has some fatigue and anxiety  No TIA or stroke symptoms. No headaches. She is on Aspirin 81 mg and Atorvastatin 20 mg nightly  Midodrine 10 mg every 6 hrs and BP ranging 130-140 at home.           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 States she is  doing the same, no significant improvement in her aphasia, still struggles with words difficulty with conservation. She remains off work.   Had a fall in March 2025 and sustain injury to left wrist  She had MRI brain/MRA head with perfusion on 3/6/25 which shows overall poor flow enhancement throughout the left middle cerebral artery.    Initial history  Zeenat Kevin is a 54 year old female who presented for hospital follow up for Shelby Shelby s/p Left-sided frontotemporal ribhlepxs-fzqc-ttfmd-synangiosis (EDAS) indirect bypass on 9/13/24 by Dr Guzman.    She initially presented to the hospital 8/27/24 with acute speech and language difficulty. MRI brain demonstrated a left hemisphere focus of infarct.  CTA head and neck demonstrated left ICA terminus steno-occlusive disease consistent with moyamoya.  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 Possible early right sided moyamoya disease with filling of the STEPHANY territory via pial collaterals     Post operatively had episodes of worsening aphasia and facial droop, BP dependent and  started on Midodrine. She continue to have orthostatic hypotension Florinef was added  Repeat mRI brain showed new infarction in the mid left MCA territory. She was discharged home on DAPT    Stable since discharge, BP fluctuates at home. Reports had history of labile hypertension and was on antihypertensive but since the stroke not taking any antihypertensives. She is taking Midodrine and Florinef as instructed. Water intake is inadequate.   She c/o fatigue, interrupted sleep since she is not taking Xywav ( has hx of idiopathic hypersomnia) intermittent right arm involuntary twitch but no limb shaking or weakness.      HISTORY:  Past Medical History:    ADHD (attention deficit hyperactivity disorder)    Allergic rhinitis    Idiopathic hypersomnia    Osteoporosis     Seasonal affective disorder    Vitamin D deficiency      Past Surgical History:   Procedure Laterality Date    Ablation  01/01/2011    uterus    Brain surgery  09/13/2024    neuro revascularization    Other surgical history  09/13/2024    CRANIOTOMY FOR A LEFT ENCEPHALODUROARTERIOSYNANGIOSIS (EDAS) INDIRECT BYPASS      Family History   Problem Relation Age of Onset    Hypertension Mother     Hypertension Brother     Other (stomach cancer) Paternal Aunt       Social History     Socioeconomic History    Marital status:    Tobacco Use    Smoking status: Never    Smokeless tobacco: Never   Vaping Use    Vaping status: Never Used   Substance and Sexual Activity    Alcohol use: Not Currently    Drug use: Never    Sexual activity: Yes     Partners: Male     Birth control/protection: Surgical     Comment: ablation    Other Topics Concern    Caffeine Concern Yes     Comment: tea    Stress Concern No    Weight Concern No    Special Diet No    Exercise No    Seat Belt Yes     Social Drivers of Health     Food Insecurity: Not At Risk (6/18/2025)    Received from [facility] and [facility]    Food Insecurity     Because difficulties at home can cause health problems, we are asking all of our patients if they are experiencing difficulties in any of the following areas:: Patient does not need help with any of these   Transportation Needs: Not At Risk (6/18/2025)    Received from [facility] and [facility]    Food Insecurity     Because difficulties at home can cause health problems, we are asking all of our patients if they are experiencing difficulties in any of the following areas:: Patient does not need help with any of these   Housing Stability: Not At Risk (6/18/2025)    Received from [facility] and [facility]    Food Insecurity     Because difficulties at home can cause health problems, we are asking all of our patients if they are  experiencing difficulties in any of the following areas:: Patient does not need help with any of these        Review of Systems   Constitutional: Negative.    HENT: Negative.     Eyes: Negative.    Respiratory: Negative.     Cardiovascular: Negative.    Gastrointestinal: Negative.    Endocrine: Negative.    Genitourinary: Negative.    Musculoskeletal: Negative.    Skin: Negative.    Allergic/Immunologic: Negative.    Neurological:  Positive for speech difficulty. Negative for dizziness, facial asymmetry, weakness and headaches.   Hematological: Negative.    Psychiatric/Behavioral: Negative.     All other systems reviewed and are negative.           Current Outpatient Medications   Medication Sig Dispense Refill    acetaminophen 325 MG Oral Tab Take 2 tablets (650 mg total) by mouth.      alendronate 70 MG Oral Tab Take 1 tablet (70 mg total) by mouth.      dexamethasone 1 MG Oral Tab TAKE 1 TABLET (1 MG TOTAL) BY MOUTH EVERY 12 HOURS FOR 2 DOSES      Calcium-Phosphorus-Vitamin D (CALCIUM/VITAMIN D3/ADULT GUMMY) 250-100-500 MG-MG-UNIT Oral Chew Tab Chew 2 Units by mouth daily.      midodrine 5 MG Oral Tab Take 1 tablet (5 mg total) by mouth 3 (three) times daily. (Patient taking differently: Take 2 tablets (10 mg total) by mouth 4 (four) times daily.) 90 tablet 5    aspirin 81 MG Oral Chew Tab Chew 1 tablet (81 mg total) by mouth daily. 90 tablet 5    ATORVASTATIN 20 MG Oral Tab TAKE 1 TABLET(20 MG) BY MOUTH EVERY NIGHT 90 tablet 0     Allergies:  Allergies   Allergen Reactions    Ciprofloxacin-Hydrocortisone ITCHING and HIVES     Tingling of hands and feet    Ibuprofen SWELLING    Nsaids ANGIOEDEMA    Birch Tar Oil [Bet[place] Alba Oil]      [place]    Modafinil OTHER (SEE COMMENTS)     Pt felt like throat was closing     PHYSICAL EXAM:   Physical Exam    Blood pressure 120/80, pulse 76, resp. rate 18, height 62\", weight 119 lb 12.8 oz (54.3 kg), SpO2 97%, not currently breastfeeding.  General Appearance: Well  nourished, well developed, no apparent distress.     HEENT: Normocephalic and atraumatic. Normal sclera. Moist mucus membrane  Neck: Normal range of motion. Neck supple.  Cardiovascular: Normal rate, regular rhythm and normal heart sounds.    Pulmonary/Chest: Effort normal and breath sounds normal.   Abdominal: Soft. Bowel sounds are normal.   Skin: dry, clean and intact  Ext: peripheral pulses present  Psych: normal mood and affect    Neurological:  Patient is awake, alert and oriented to person, place and time   Mild expressive aphasia and dysarthia. Intact comprehension and naming    Cranial Nerves:   II: Visual acuity: normal   Visual fields: normal  III: Pupils: equal, round, reactive to light  III,IV,VI: Extra Ocular Movements: intact  V: Facial sensation: intact  VII: Facial strength: mild right facial weakness  VIII: Hearing: intact  IX: Palate: intact  XI: Shoulder shrug: intact  XII: Tongue movement: normal    Motor: No drift Strength is  5 out of 5 in all extremities bilaterally.  Sensory: Sensory examination is normal to light touch and pinprick   Coordination: Intact  Gait: Normal casual gait              TESTS/IMAGING:             MRI brain w and wo contrast: 6/24/2025    Impression    IMPRESSION:    1.  Postsurgical changes related to vertex craniotomy and EC-IC revascularization procedure, with small regions of cortical injury in the left superior frontal gyrus. Expected subdural collections without mass effect.           Cerebral angiography : 6/14/2025  1. Today's diagnostic cerebral angiogram demonstrates severe left anterior circulation steno occlusive disease with occlusion of the carotid terminus distal to the origin of the anterior choroidal artery.     2.  Patent left indirect bypass supplies approximately 40% of the left MCA territory, with robust augmentation from sensitive posterior circulation collaterals to the left STEPHANY and MCA territories as described above.     3.  Right groin  hemostasis achieved with a Perclose closure device. The right common femoral artery and right dorsalis pedis pulses were palpable at the conclusion of the procedure.     4.  Findings discussed with the patient's family at the time of completion of the diagnostic angiogram.                                 MRI brain/MRA head w and wo contrast: 3/6/2025      Impression   CONCLUSION:       1. There are postoperative changes from a left-sided extracranial to intracranial bypass.  There is arterial flow related enhancement within a vascular branch at the left-sided craniotomy.  There is flow related enhancement from this internalized vessel  with a vessel at an anterior left temporal lobe sulcus.  However, there is overall poor flow related enhancement throughout the left middle cerebral artery.     2. Sequelae of left-sided moyamoya disease is again noted with probable occlusion at the left carotid terminus, diminutive caliber of the visualized left internal carotid artery, and hypertrophy of the distal left posterior cerebral artery branches  likely secondary to collateralization.     3. The right A1 segment of the anterior cerebral artery is diminutive in caliber.  The left A1 segment is not seen.  There is overall moderate attenuation of the flow related enhancement within the anterior cerebral arteries.  This may represent  developing moyamoya disease at the bilateral anterior cerebral artery circulation.                                 MRI brain: 9/22/2024                   Impression   CONCLUSION:  New acute infarction in the mid left MCA territory is noted.  This occupies approximately 1/3 of the left MCA territory.           MRI brain 8/27/2024                  Impression   CONCLUSION:  Abnormal brain MRI.  There is a 15 mm focus of restricted diffusion within the posterior left frontal lobe.  There are smaller scattered punctate foci of restriction abnormality within the left central semiovale.  These imaging  findings are  suspicious for a 15 mm acute infarct within the posterior left frontal lobe with scattered punctate subacute infarcts within the parasagittal left frontal lobe.  These findings are within the watershed territory of the left middle cerebral artery.  A CTA   of the head and neck is recommended for further assessment.  A demyelinating process cannot be entirely excluded and continued clinical and imaging follow-up is recommended for further assessment.                CTA head and neck    Impression   CONCLUSION:       1. There are stable scattered areas of hypoattenuation in the left frontal lobe there are better characterized on the recent MRI of the brain and suspicious for acute to subacute infarcts.  The largest of these measures up to 16 mm.  Clinical correlation   and continued follow-up is suggested.  No evidence of hemorrhagic transformation.     2. 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  There is asymmetric diminutive caliber of the left internal carotid artery branches.  The left middle cerebral artery branches are asymmetrically diminutive when compared to the   right which is also likely on developmental basis.  There is marked hyperplasia of bilateral anterior cerebral artery A1 segments with markedly diminutive and dysplastic anterior cerebral artery branches the remainder of the territory period there are  wished of small vessels along the bilateral carotid termini.  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  The overall findings likely fall within the spectrum of moyamoya disease, with other etiologies not entirely excluded.  Clinical correlation  recommended.     3. No evidence of occlusion, dissection, or flow-limiting stenosis in the cervical vertebral or carotid arteries. No evidence of hemodynamically significant carotid stenosis by NASCET criteria.                 ASSESSMENT/PLAN:       ICD-10-CM    1. Moyamoya disease  I67.5       2. Status  post encephaloduroarteriomyosynangiosis  Z98.890     Z86.79               Left MCA stroke due to left carotid terminus stenoocclusive disease/Shelby Shelby disease  S/p left-sided frontotemporal aqqxrjcnk-fohj-peywl-synangiosis (EDAS) indirect bypass on 9/13/24   S/p indirect revascularization to her Lt STEPHANY territory via a Lt craniotomy and pericranial graft on 6/18/2025 at [place]     Post operate MRI brain from 6/24/2025 looks stable    Plan  Continue Aspirin 81 mg daily and atorvastatin 20 mg nightly  Continue Midodrine 10 mg TID  ( monitor BP and do not take SBP>160)    Follow with Dr Guzman Neurosurgery     Follow up imaging studies per Dr Steele team, happy to get the studies done here  Patient and  will let us know.            Davis Issa MD   [facility] [place]      This note was prepared using Dragon Medical voice recognition dictation software. As a result errors may occur. When identified these errors have been corrected. While every attempt is made to correct errors during dictation discrepancies may still exist         Meds This Visit:  Requested Prescriptions      No prescriptions requested or ordered in this encounter       Imaging & Referrals:  None     ID#[id number]

## (undated) DIAGNOSIS — M25.521 RIGHT ELBOW PAIN: Primary | ICD-10-CM

## (undated) NOTE — LETTER
Patient Name: Isabella Kevin  YOB: 1970          MRN :  [id number]  Date:  11/6/2024  Referring Physician:  Daniel Brady    Progress Summary    Dear Dr. Brady,     Pt has attended 12 visits in Speech Therapy. This letter is to inform you of Zeenat's progress in speech-language therapy.    Since Zeenat's initial evaluation, she has attended 12 sessions. Therapy sessions have targeted expressive language and multimodal communication. A home exercise program (HEP) addressing expressive language has been provided and completed consistently. During this treatment period, Zeenat has demonstrated improved ability to increase her utterance length during structured tasks and writing given direct training and opportunities for practice. As she has demonstrated significant progress and continues to demonstrate the need of skilled cueing and training, it is recommended that she continue speech therapy to continue progress towards goals and functional communication for daily use.      Subjective: Patient arrived on time to session. Patient participated actively in therapeutic tasks. This treatment was provided by  clinician, Patricia Mg, under the direction and supervision of a licensed speech-language pathologist, who provided consultation regarding skilled judgements, treatment, and assessment of patient care.      Pain: Patient did not report pain during session.     Objective:  Goals: (to be met in 12 visits)   STG 1: Patient to complete further assessment with additional goals added as appropriate.  Progress: Goal met. See previous notes.    STG 2B: Patient will expand utterances to 4-6 word phrases within 80% of opportunities given mod verbal and visual cueing.  Progress: Patient expanded utterances to 4-6 word phrases with 80% accuracy given mod verbal and visual cues.       STG 3: Patient will generate at least 4 agents and 4 patients during verb network strengthening exercises  given mod verbal and visual cues.  Progress: Patient able to generate 3 agents and 2 patients given mod-max verbal cueing. Goal reflects previous session; goal not targeted d/t focus on other goals.     STG 4: Patient to use trained compensatory speech strategies (slow rate, over-articulation, syllable segmentation, pausing between words, breath support) with 80% accuracy given mod verbal and visual cues.  Progress: Patient utilized over-articulation and segmentation strategies given mod cueing with 80% accuracy given mod verbal and visual cues.     STG 5: Patient will produce meaningful phrases with accurate articulation and 1 self-correction or less per phrase given mod verbal and visual cueing.   Progress: Patient benefited from mod cueing to support accurate articulation. Patient noted to increase awareness and ability to self-correct. Goal met; to gauge carryover in next session.     HEP: functional words/phrases, constant therapy   Education: Prognostic factors    Assessment: Patient presents with significant expressive language deficits and moderately impaired auditory comprehension. Patient's expressive language is marked by single words, hesitations, halting speech, significant word finding difficulty, slow speech, and limited yet effective functional phrases. Patient also presents with motor speech deficits c/b inconsistent articulation errors, incoordination of respiration and phonation, and increased difficulty with multi-syllable words. Patient's deficits impact her ability to communicate effectively. Patient requires verbal cueing to support revisions, phonemic cueing, syllable segmentation and repetition of stimuli to increase accuracy of productions and expansion of utterances to sentence level. Patient benefits from opportunities to write and read functional phrases and sentences to aid in multimodal communication. Continue treatment to focus on lexical-semantic routes. Continued intervention is  medically necessary.       Plan: Continue skilled Speech Therapy 2 x/week or a total of 10 visits over a 90 day period. Treatment will include: expressive language and motor speech     Patient/Family/Caregiver was advised of these findings, precautions, and treatment options and has agreed to actively participate in planning and for this course of care.    Thank you for your referral. If you have any questions, please contact me at Dept: [phone].    Sincerely,  Electronically signed by therapist: Patricia Mg     Physician's certification required:  Yes  Please co-sign or sign and return this letter via fax as soon as possible to [phone].   I certify the need for these services furnished under this plan of treatment and while under my care.    X___________________________________________________ Date____________________    Certification From: 11/6/2024  To:2/4/2025   Skilled treatment provided by MERT Vale  Clinician.  Treatment supervised and note co-signed by Maryjo Hedrick MS CCC-SLP.               21st Century Cures Act Notice to Patient: Medical documents like this are made available to patients in the interest of transparency. However, be advised this is a medical document and it is intended as [id number] communication between your medical providers. This medical document may contain abbreviations, assessments, medical data, and results or other terms that are unfamiliar. Medical documents are intended to carry relevant information, facts as evident, and the clinical opinion of the practitioner. As such, this medical document may be written in language that appears blunt or direct. You are encouraged to contact your medical provider and/or [facility] Patient Experience if you have any questions about this medical document.

## (undated) NOTE — LETTER
[address]  [address]  Authorization for Surgical Operation and Procedure     Date:___________                                                                                                         Time:__________  I hereby authorize Surgeon(s):  Cornelio Guzman MD, my physician and his/her assistants (if applicable), which may include medical students, residents, and/or fellows, to perform the following surgical operation/ procedure and administer such anesthesia as may be determined necessary by my physician:  Operation/Procedure name (s) Procedure(s):  CRANIOTOMY FOR A LEFT ENCEPHALODUROARTERIOSYNANGIOSIS (EDAS) INDIRECT BYPASS  INTRAOPERATIVE NEURO MONITORING on Isabella Kevin   2.   I recognize that during the surgical operation/procedure, unforeseen conditions may necessitate additional or different procedures than those listed above.  I, therefore, further authorize and request that the above-named surgeon, assistants, or designees perform such procedures as are, in their judgment, necessary and desirable.    3.   My surgeon/physician has discussed prior to my surgery the potential benefits, risks and side effects of this procedure; the likelihood of achieving goals; and potential problems that might occur during recuperation.  They also discussed reasonable alternatives to the procedure, including risks, benefits, and side effects related to the alternatives and risks related to not receiving this procedure.  I have had all my questions answered and I acknowledge that no guarantee has been made as to the result that may be obtained.    4.   Should the need arise during my operation/procedure, which includes change of level of care prior to discharge, I also consent to the administration of blood and/or blood products.  Further, I understand that despite careful testing and screening of blood or blood products by collecting agencies, I may still be  subject to ill effects as a result of receiving a blood transfusion and/or blood products.  The following are some, but not all, of the potential risks that can occur: fever and allergic reactions, hemolytic reactions, transmission of diseases such as Hepatitis, AIDS and Cytomegalovirus (CMV) and fluid overload.  In the event that I wish to have an autologous transfusion of my own blood, or a directed donor transfusion, I will discuss this with my physician.  Check only if Refusing Blood or Blood Products  I understand refusal of blood or blood products as deemed necessary by my physician may have serious consequences to my condition to include possible death. I hereby assume responsibility for my refusal and release the hospital, its personnel, and my physicians from any responsibility for the consequences of my refusal.          o  Refuse      5.   I authorize the use of any specimen, organs, tissues, body parts or foreign objects that may be removed from my body during the operation/procedure for diagnosis, research or teaching purposes and their subsequent disposal by hospital authorities.  I also authorize the release of specimen test results and/or written reports to my treating physician on the hospital medical staff or other referring or consulting physicians involved in my care, at the discretion of the Pathologist or my treating physician.    6.   I consent to the photographing or videotaping of the operations or procedures to be performed, including appropriate portions of my body for medical, scientific, or educational purposes, provided my identity is not revealed by the pictures or by descriptive texts accompanying them.  If the procedure has been photographed/videotaped, the surgeon will obtain the original picture, image, videotape or CD.  The hospital will not be responsible for storage, release or maintenance of the picture, image, tape or CD.    7.   I consent to the presence of a product  specialist or observers in the operating room as deemed necessary by my physician or their designees.    8.   I recognize that in the event my procedure results in extended X-Ray/fluoroscopy time, I may develop a skin reaction.    9. If I have a Do Not Attempt Resuscitation (DNAR) order in place, that status will be suspended while in the operating room, procedural suite, and during the recovery period unless otherwise explicitly stated by me (or a person authorized to consent on my behalf). The surgeon or my attending physician will determine when the applicable recovery period ends for purposes of reinstating the DNAR order.  10. Patients having a sterilization procedure: I understand that if the procedure is successful the results will be permanent and it will therefore be impossible for me to inseminate, conceive, or bear children.  I also understand that the procedure is intended to result in sterility, although the result has not been guaranteed.   11. I acknowledge that my physician has explained sedation/analgesia administration to me including the risk and benefits I consent to the administration of sedation/analgesia as may be necessary or desirable in the judgment of my physician.    I CERTIFY THAT I HAVE READ AND FULLY UNDERSTAND THE ABOVE CONSENT TO OPERATION and/or OTHER PROCEDURE.    _________________________________________  __________________________________  Signature of Patient     Signature of Responsible Person         ___________________________________         Printed Name of Responsible Person           _________________________________                 Relationship to Patient  _________________________________________  ______________________________  Signature of Witness          Date  Time      Patient Name: Isabella Kevin     : 1970                 Printed: 2024     Medical Record #: [id number]                     Page 2 of 3                                     [address]  [address]    Consent for Anesthesia    I, Isabella Kevin agree to be cared for by an anesthesiologist, who is specially trained to monitor me and give me medicine to put me to sleep or keep me comfortable during my procedure    I understand that my anesthesiologist is not an employee or agent of [facility] or [employer]. He or she works for [employer].    As the patient asking for anesthesia services, I agree to:  Allow the anesthesiologist (anesthesia doctor) to give me medicine and do additional procedures as necessary. Some examples are: Starting or using an “IV” to give me medicine, fluids or blood during my procedure, and having a breathing tube placed to help me breathe when I’m asleep (intubation). In the event that my heart stops working properly, I understand that my anesthesiologist will make every effort to sustain my life, unless otherwise directed by [facility] Do Not Resuscitate documents.  Tell my anesthesia doctor before my procedure:  If I am pregnant.  The last time that I ate or drank.  All of the medicines I take (including prescriptions, herbal supplements, and pills I can buy without a prescription (including street drugs/illegal medications). Failure to inform my anesthesiologist about these medicines may increase my risk of anesthetic complications.  If I am allergic to anything or have had a reaction to anesthesia before.  I understand how the anesthesia medicine will help me (benefits).  I understand that with any type of anesthesia medicine there are risks:  The most common risks are: nausea, vomiting, sore throat, muscle soreness, damage to my eyes, mouth, or teeth (from breathing tube placement).  Rare risks include: remembering what happened during my procedure, allergic reactions to medications, injury to my airway, heart, lungs, vision, nerves, or muscles and in extremely rare  instances death.  My doctor has explained to me other choices available to me for my care (alternatives).  Pregnant Patients (“epidural”):  I understand that the risks of having an epidural (medicine given into my back to help control pain during labor), include itching, low blood pressure, difficulty urinating, headache or slowing of the baby’s heart. Very rare risks include infection, bleeding, seizure, irregular heart rhythms and nerve injury.  Regional Anesthesia (“spinal”, “epidural”, & “nerve blocks”):  I understand that rare but potential complications include headache, bleeding, infection, seizure, irregular heart rhythms, and nerve injury.    I can change my mind about having anesthesia services at any time before I get the medicine.    _____________________________________________________________________________  Patient (or Representative) Signature/Relationship to Patient  Date   Time    _____________________________________________________________________________   Name (if used)    Language/Organization   Time    _____________________________________________________________________________  Anesthesiologist Signature     Date   Time  I have discussed the procedure and information above with the patient (or patient’s representative) and answered their questions. The patient or their representative has agreed to have anesthesia services.    _____________________________________________________________________________  Witness        Date   Time  I have verified that the signature is that of the patient or patient’s representative, and that it was signed before the procedure  Patient Name: Isabella Kevin     : 1970                 Printed: 2024     Medical Record #: [id number]                     Page 3 of 3

## (undated) NOTE — LETTER
Patient Name: Isabella Kevin  YOB: 1970          MRN number:  [id number]  Date:  12/24/2024  Referring Physician:  Daniel Brady    Discharge Summary  Pt has attended 25 visits in Speech Therapy.     Dear Dr. Steward,  This letter is to inform you of Zeenat Kevin's progress in speech-language therapy.    Since her initial evaluation, Zeenat has attended 25 sessions. Therapy sessions have targeted motor speech and expressive language. A home exercise program (HEP) addressing lexical retrieval, reading, writing, and motor planning skills has been provided and completed consistently. During this treatment period, Zeenat has demonstrated improved expansion of utterances, awareness of errors, and use of compensatory strategies to express self effectively. At this time, Zeenat will transition to another outpatient rehabilitation clinic. As such, it is recommended that Zeenat be discharged from this plan of care to facilitate transition. Continued therapeutic intervention is medically necessary.    Subjective: Patient arrived on time to session. Patient participated actively in therapeutic tasks.    Pain: Patient did not report pain during session.     Objective:  Goals: (to be met in 12 additional visits)   STG 1: Patient to complete further assessment with additional goals added as appropriate.  Progress: Goal met. See previous notes.    STG 2C: Patient will expand utterances to 4-6 word phrases within 90% of opportunities given min verbal and visual cueing.  Progress: Patient expanded utterances to 4-6 word phrases with 90% accuracy given mod verbal and visual cues. Goal discontinued. Further goals to be delineated at next level of care.    STG 3B: Patient will generate at least 5 agents and 5 patients during verb network strengthening exercises given min verbal and visual cues.  Progress: Patient able to generate 5 agents and 5 patients given mod verbal cueing. Goal discontinued. Further goals  to be delineated at next level of care.    STG 4C: Patient to use trained compensatory speech strategies (slow rate, over-articulation, syllable segmentation, pausing between words, breath support) with 85% accuracy given min verbal and visual cues.  Progress: Patient utilized over-articulation and segmentation strategies with 85% accuracy given min verbal and visual cues. Goal discontinued. Further goals to be delineated at next level of care.    STG 5B: Patient will produce meaningful phrases with accurate articulation given min verbal and visual cueing.  Progress: Patient benefited from min cueing to support accurate articulation. Goal met.      Discharge (12/24/2024):  Patient administered the Western Aphasia Battery- Revised (WAB-R). The WAB-R is an individually administered test designed to evaluate a patient's language function and determine presence/absence of aphasia. The Aphasia Quotient (AQ) of the WAB-R is a core measure of aphasia derived from testing in the following subtests: Spontaneous Speech, Auditory Verbal Comprehension, Repetition, and Word Finding/Naming. Patient has an AQ of 89.6/100 which is below the normal range (score of 98.4/100 is indicative of non-brain damaged control group). The following scores were obtained:      Aphasia Quotient 89.6/100   Spontaneous Speech 17/20   Auditory Verbal Comprehension 9.2/10   Repetition 9.8/10   Naming/Word Finding 8.8/10       Evaluation (9/30/2024):  Patient administered the Western Aphasia Battery- Revised (WAB-R). The WAB-R is an individually administered test designed to evaluate a patient's language function and determine presence/absence of aphasia. The Aphasia Quotient (AQ) of the WAB-R is a core measure of aphasia derived from testing in the following subtests: Spontaneous Speech, Auditory Verbal Comprehension, Repetition, and Word Finding/Naming. Patient has an AQ of 58.5/100 which is below the normal range (score of 98.4/100 is indicative of  non-brain damaged control group). The following scores were obtained:      Aphasia Quotient 58.5/100   Spontaneous Speech 10/20   Auditory Verbal Comprehension 6.95/10   Repetition 6.7/10   Naming/Word Finding 5.6/10        HEP: functional words/phrases, constant therapy   Education: Progress, plan for narrative/discourse treatment    Assessment: Patient presents with expressive language deficits and mildly impaired auditory comprehension. Patient's expressive language is c/b hesitations, slow and deliberate speech, and lexical retrieval deficits. Patient also presents with motor speech deficits c/b inconsistent articulation errors, incoordination of respiration and phonation, and increased difficulty with multi-syllable words. From start of care, patient demonstrated receptiveness to skilled cueing to support lexical retrieval and expansion of utterance length. Patient has also improved upon her ability to self-monitor and self-correct. Continued intervention is medically necessary to support progress toward goals and improvement in expression of self. Patient to transition care to another outpatient speech therapy practice in the new year. As such, she will be discharged from this plan of care with recommendation for continue therapeutic intervention.      Plan: Patient to be discharged from this plan of care for speech-language therapy as she is transitioning care to another outpatient rehabilitation clinic. Continued skilled intervention targeting expressive language and motor speech is recommended.    Patient/Family/Caregiver was advised of these findings, precautions, and treatment options and has agreed to actively participate in planning and for this course of care.    Thank you for your referral. If you have any questions, please contact me at Dept: [phone].    Sincerely,  Electronically signed by therapist: MERT Neville         21st Century Cures Act Notice to Patient: Medical documents like  this are made available to patients in the interest of transparency. However, be advised this is a medical document and it is intended as [id number] communication between your medical providers. This medical document may contain abbreviations, assessments, medical data, and results or other terms that are unfamiliar. Medical documents are intended to carry relevant information, facts as evident, and the clinical opinion of the practitioner. As such, this medical document may be written in language that appears blunt or direct. You are encouraged to contact your medical provider and/or [facility] Patient Experience if you have any questions about this medical document.

## (undated) NOTE — LETTER
10/09/24          Isabella Garza KENNY Kevin  :  1970      To Whom It May Concern:    This patient was seen in our office on 24 .  Work status:  Remain off work until further evaluation near the end of November.    If this office may be of further assistance, please do not hesitate to contact us.      Sincerely,          Davis Issa MD  Vascular Neurology

## (undated) NOTE — LETTER
[address]  [address]  Authorization for Surgical Operation and Procedure     Date:__[id number]_________                                                                                                         Time:__0723________  I hereby authorize Surgeon(s):  Cornelio Guzman MD, my physician and his/her assistants (if applicable), which may include medical students, residents, and/or fellows, to perform the following surgical operation/ procedure and administer such anesthesia as may be determined necessary by my physician:  Operation/Procedure name (s) Procedure(s):  CRANIOTOMY FOR A LEFT ENCEPHALODUROARTERIOSYNANGIOSIS (EDAS) INDIRECT BYPASS  INTRAOPERATIVE NEURO MONITORING on Isabella Kevin   2.   I recognize that during the surgical operation/procedure, unforeseen conditions may necessitate additional or different procedures than those listed above.  I, therefore, further authorize and request that the above-named surgeon, assistants, or designees perform such procedures as are, in their judgment, necessary and desirable.    3.   My surgeon/physician has discussed prior to my surgery the potential benefits, risks and side effects of this procedure; the likelihood of achieving goals; and potential problems that might occur during recuperation.  They also discussed reasonable alternatives to the procedure, including risks, benefits, and side effects related to the alternatives and risks related to not receiving this procedure.  I have had all my questions answered and I acknowledge that no guarantee has been made as to the result that may be obtained.    4.   Should the need arise during my operation/procedure, which includes change of level of care prior to discharge, I also consent to the administration of blood and/or blood products.  Further, I understand that despite careful testing and screening of blood or blood products by collecting agencies, I may  still be subject to ill effects as a result of receiving a blood transfusion and/or blood products.  The following are some, but not all, of the potential risks that can occur: fever and allergic reactions, hemolytic reactions, transmission of diseases such as Hepatitis, AIDS and Cytomegalovirus (CMV) and fluid overload.  In the event that I wish to have an autologous transfusion of my own blood, or a directed donor transfusion, I will discuss this with my physician.  Check only if Refusing Blood or Blood Products  I understand refusal of blood or blood products as deemed necessary by my physician may have serious consequences to my condition to include possible death. I hereby assume responsibility for my refusal and release the hospital, its personnel, and my physicians from any responsibility for the consequences of my refusal.          o  Refuse      5.   I authorize the use of any specimen, organs, tissues, body parts or foreign objects that may be removed from my body during the operation/procedure for diagnosis, research or teaching purposes and their subsequent disposal by hospital authorities.  I also authorize the release of specimen test results and/or written reports to my treating physician on the hospital medical staff or other referring or consulting physicians involved in my care, at the discretion of the Pathologist or my treating physician.    6.   I consent to the photographing or videotaping of the operations or procedures to be performed, including appropriate portions of my body for medical, scientific, or educational purposes, provided my identity is not revealed by the pictures or by descriptive texts accompanying them.  If the procedure has been photographed/videotaped, the surgeon will obtain the original picture, image, videotape or CD.  The hospital will not be responsible for storage, release or maintenance of the picture, image, tape or CD.    7.   I consent to the presence of a product  specialist or observers in the operating room as deemed necessary by my physician or their designees.    8.   I recognize that in the event my procedure results in extended X-Ray/fluoroscopy time, I may develop a skin reaction.    9. If I have a Do Not Attempt Resuscitation (DNAR) order in place, that status will be suspended while in the operating room, procedural suite, and during the recovery period unless otherwise explicitly stated by me (or a person authorized to consent on my behalf). The surgeon or my attending physician will determine when the applicable recovery period ends for purposes of reinstating the DNAR order.  10. Patients having a sterilization procedure: I understand that if the procedure is successful the results will be permanent and it will therefore be impossible for me to inseminate, conceive, or bear children.  I also understand that the procedure is intended to result in sterility, although the result has not been guaranteed.   11. I acknowledge that my physician has explained sedation/analgesia administration to me including the risk and benefits I consent to the administration of sedation/analgesia as may be necessary or desirable in the judgment of my physician.    I CERTIFY THAT I HAVE READ AND FULLY UNDERSTAND THE ABOVE CONSENT TO OPERATION and/or OTHER PROCEDURE.    _________________________________________  __________________________________  Signature of Patient     Signature of Responsible Person         ___________________________________         Printed Name of Responsible Person           _________________________________                 Relationship to Patient  _________________________________________  ______________________________  Signature of Witness          Date  Time      Patient Name: Isabella eKvin     : 1970                 Printed: 2024     Medical Record #: [id number]                     Page 2 of 3                                     [address]  [address]    Consent for Anesthesia    I, Isabella Kevin agree to be cared for by an anesthesiologist, who is specially trained to monitor me and give me medicine to put me to sleep or keep me comfortable during my procedure    I understand that my anesthesiologist is not an employee or agent of [facility] or [employer]. He or she works for [employer].    As the patient asking for anesthesia services, I agree to:  Allow the anesthesiologist (anesthesia doctor) to give me medicine and do additional procedures as necessary. Some examples are: Starting or using an “IV” to give me medicine, fluids or blood during my procedure, and having a breathing tube placed to help me breathe when I’m asleep (intubation). In the event that my heart stops working properly, I understand that my anesthesiologist will make every effort to sustain my life, unless otherwise directed by [facility] Do Not Resuscitate documents.  Tell my anesthesia doctor before my procedure:  If I am pregnant.  The last time that I ate or drank.  All of the medicines I take (including prescriptions, herbal supplements, and pills I can buy without a prescription (including street drugs/illegal medications). Failure to inform my anesthesiologist about these medicines may increase my risk of anesthetic complications.  If I am allergic to anything or have had a reaction to anesthesia before.  I understand how the anesthesia medicine will help me (benefits).  I understand that with any type of anesthesia medicine there are risks:  The most common risks are: nausea, vomiting, sore throat, muscle soreness, damage to my eyes, mouth, or teeth (from breathing tube placement).  Rare risks include: remembering what happened during my procedure, allergic reactions to medications, injury to my airway, heart, lungs, vision, nerves, or muscles and in extremely rare  instances death.  My doctor has explained to me other choices available to me for my care (alternatives).  Pregnant Patients (“epidural”):  I understand that the risks of having an epidural (medicine given into my back to help control pain during labor), include itching, low blood pressure, difficulty urinating, headache or slowing of the baby’s heart. Very rare risks include infection, bleeding, seizure, irregular heart rhythms and nerve injury.  Regional Anesthesia (“spinal”, “epidural”, & “nerve blocks”):  I understand that rare but potential complications include headache, bleeding, infection, seizure, irregular heart rhythms, and nerve injury.    I can change my mind about having anesthesia services at any time before I get the medicine.    _____________________________________________________________________________  Patient (or Representative) Signature/Relationship to Patient  Date   Time    _____________________________________________________________________________   Name (if used)    Language/Organization   Time    _____________________________________________________________________________  Anesthesiologist Signature     Date   Time  I have discussed the procedure and information above with the patient (or patient’s representative) and answered their questions. The patient or their representative has agreed to have anesthesia services.    _____________________________________________________________________________  Witness        Date   Time  I have verified that the signature is that of the patient or patient’s representative, and that it was signed before the procedure  Patient Name: Isabella Kevin     : 1970                 Printed: 2024     Medical Record #: [id number]                     Page 3 of 3